# Patient Record
Sex: MALE | Race: WHITE | Employment: FULL TIME | URBAN - NONMETROPOLITAN AREA
[De-identification: names, ages, dates, MRNs, and addresses within clinical notes are randomized per-mention and may not be internally consistent; named-entity substitution may affect disease eponyms.]

---

## 2017-09-22 ENCOUNTER — HOSPITAL ENCOUNTER (OUTPATIENT)
Dept: GENERAL RADIOLOGY | Age: 42
Discharge: HOME | End: 2017-09-22
Payer: COMMERCIAL

## 2017-09-22 ENCOUNTER — OFFICE VISIT (OUTPATIENT)
Dept: PRIMARY CARE CLINIC | Age: 42
End: 2017-09-22
Payer: COMMERCIAL

## 2017-09-22 ENCOUNTER — HOSPITAL ENCOUNTER (OUTPATIENT)
Dept: LAB | Age: 42
Setting detail: SPECIMEN
Discharge: HOME | End: 2017-09-22
Payer: COMMERCIAL

## 2017-09-22 VITALS
HEART RATE: 108 BPM | DIASTOLIC BLOOD PRESSURE: 98 MMHG | TEMPERATURE: 98 F | OXYGEN SATURATION: 93 % | BODY MASS INDEX: 45.1 KG/M2 | RESPIRATION RATE: 20 BRPM | WEIGHT: 315 LBS | HEIGHT: 70 IN | SYSTOLIC BLOOD PRESSURE: 158 MMHG

## 2017-09-22 DIAGNOSIS — R60.0 BILATERAL LEG EDEMA: ICD-10-CM

## 2017-09-22 DIAGNOSIS — I10 ESSENTIAL HYPERTENSION: ICD-10-CM

## 2017-09-22 DIAGNOSIS — R05.9 COUGH: ICD-10-CM

## 2017-09-22 DIAGNOSIS — R09.89 PULMONARY VASCULAR CONGESTION: Primary | ICD-10-CM

## 2017-09-22 DIAGNOSIS — R06.02 SOB (SHORTNESS OF BREATH): ICD-10-CM

## 2017-09-22 LAB
ABSOLUTE EOS #: 0 K/UL (ref 0–0.4)
ABSOLUTE LYMPH #: 2 K/UL (ref 1–4.8)
ABSOLUTE MONO #: 1 K/UL (ref 0.1–1.2)
ALBUMIN SERPL-MCNC: 4.2 G/DL (ref 3.5–5.2)
ALBUMIN/GLOBULIN RATIO: 1.4 (ref 1–2.5)
ALP BLD-CCNC: 82 U/L (ref 40–129)
ALT SERPL-CCNC: 37 U/L (ref 5–41)
ANION GAP SERPL CALCULATED.3IONS-SCNC: 10 MMOL/L (ref 9–17)
AST SERPL-CCNC: 27 U/L
BASOPHILS # BLD: 1 % (ref 0–2)
BASOPHILS ABSOLUTE: 0.1 K/UL (ref 0–0.2)
BILIRUB SERPL-MCNC: 0.55 MG/DL (ref 0.3–1.2)
BNP INTERPRETATION: ABNORMAL
BUN BLDV-MCNC: 10 MG/DL (ref 6–20)
BUN/CREAT BLD: 17 (ref 9–20)
CALCIUM SERPL-MCNC: 9.4 MG/DL (ref 8.6–10.4)
CHLORIDE BLD-SCNC: 103 MMOL/L (ref 98–107)
CO2: 28 MMOL/L (ref 20–31)
CREAT SERPL-MCNC: 0.59 MG/DL (ref 0.7–1.2)
DIFFERENTIAL TYPE: ABNORMAL
EOSINOPHILS RELATIVE PERCENT: 0 % (ref 1–8)
GFR AFRICAN AMERICAN: >60 ML/MIN
GFR NON-AFRICAN AMERICAN: >60 ML/MIN
GFR SERPL CREATININE-BSD FRML MDRD: ABNORMAL ML/MIN/{1.73_M2}
GFR SERPL CREATININE-BSD FRML MDRD: ABNORMAL ML/MIN/{1.73_M2}
GLUCOSE BLD-MCNC: 138 MG/DL (ref 70–99)
HCT VFR BLD CALC: 46.7 % (ref 41–53)
HEMOGLOBIN: 15.3 G/DL (ref 13.5–17.5)
LYMPHOCYTES # BLD: 17 % (ref 15–43)
MCH RBC QN AUTO: 26.1 PG (ref 26–34)
MCHC RBC AUTO-ENTMCNC: 32.7 G/DL (ref 31–37)
MCV RBC AUTO: 80 FL (ref 80–100)
MONOCYTES # BLD: 8 % (ref 6–14)
PDW BLD-RTO: 13.8 % (ref 11–14.5)
PLATELET # BLD: 211 K/UL (ref 140–450)
PLATELET ESTIMATE: ABNORMAL
PMV BLD AUTO: 10.1 FL (ref 6–12)
POTASSIUM SERPL-SCNC: 4.4 MMOL/L (ref 3.7–5.3)
PRO-BNP: 714 PG/ML
RBC # BLD: 5.83 M/UL (ref 4.5–5.9)
RBC # BLD: ABNORMAL 10*6/UL
SEG NEUTROPHILS: 74 % (ref 44–74)
SEGMENTED NEUTROPHILS ABSOLUTE COUNT: 9.1 K/UL (ref 1.8–7.7)
SODIUM BLD-SCNC: 141 MMOL/L (ref 135–144)
TOTAL PROTEIN: 7.2 G/DL (ref 6.4–8.3)
WBC # BLD: 12.2 K/UL (ref 3.5–11)
WBC # BLD: ABNORMAL 10*3/UL

## 2017-09-22 PROCEDURE — 80053 COMPREHEN METABOLIC PANEL: CPT

## 2017-09-22 PROCEDURE — 85025 COMPLETE CBC W/AUTO DIFF WBC: CPT

## 2017-09-22 PROCEDURE — 71020 XR CHEST STANDARD TWO VW: CPT

## 2017-09-22 PROCEDURE — 36415 COLL VENOUS BLD VENIPUNCTURE: CPT

## 2017-09-22 PROCEDURE — 83880 ASSAY OF NATRIURETIC PEPTIDE: CPT

## 2017-09-22 PROCEDURE — 99203 OFFICE O/P NEW LOW 30 MIN: CPT | Performed by: NURSE PRACTITIONER

## 2017-09-22 RX ORDER — FUROSEMIDE 40 MG/1
40 TABLET ORAL 2 TIMES DAILY
Qty: 14 TABLET | Refills: 0 | Status: SHIPPED | OUTPATIENT
Start: 2017-09-22 | End: 2017-11-22 | Stop reason: ALTCHOICE

## 2017-09-22 RX ORDER — VALSARTAN AND HYDROCHLOROTHIAZIDE 160; 12.5 MG/1; MG/1
1 TABLET, FILM COATED ORAL DAILY
Qty: 30 TABLET | Refills: 3 | Status: SHIPPED | OUTPATIENT
Start: 2017-09-22 | End: 2017-10-05 | Stop reason: SDUPTHER

## 2017-09-22 ASSESSMENT — ENCOUNTER SYMPTOMS
SHORTNESS OF BREATH: 1
COUGH: 1
SPUTUM PRODUCTION: 1
ORTHOPNEA: 1

## 2017-10-03 ENCOUNTER — TELEPHONE (OUTPATIENT)
Dept: CARDIOLOGY CLINIC | Age: 42
End: 2017-10-03

## 2017-10-03 NOTE — TELEPHONE ENCOUNTER
LM to notify pt that Dr. Eber Montaño had ordered an additional test (heart cath) that is typically done at [facility] in [place]. I did let him know that I faxed his info to [address] at [facility], so she can call him to schedule. I asked him to call us, so we could give him more information.

## 2017-10-03 NOTE — TELEPHONE ENCOUNTER
1100- LM for pt to call for stress test results per Dr. Anila Oro written orders from [facility] today.

## 2017-10-04 NOTE — TELEPHONE ENCOUNTER
Pt notified about abnormal stress and heart cath order faxed to Najma Chao,  will contact him to set up cath. If she doesn't call this week he will call us. Pt states understanding and accepts.

## 2017-10-05 ENCOUNTER — OFFICE VISIT (OUTPATIENT)
Dept: FAMILY MEDICINE CLINIC | Age: 42
End: 2017-10-05
Payer: COMMERCIAL

## 2017-10-05 VITALS
OXYGEN SATURATION: 98 % | HEART RATE: 90 BPM | WEIGHT: 315 LBS | DIASTOLIC BLOOD PRESSURE: 84 MMHG | HEIGHT: 70 IN | SYSTOLIC BLOOD PRESSURE: 132 MMHG | RESPIRATION RATE: 20 BRPM | BODY MASS INDEX: 45.1 KG/M2

## 2017-10-05 DIAGNOSIS — Z13.220 SCREENING FOR HYPERLIPIDEMIA: ICD-10-CM

## 2017-10-05 DIAGNOSIS — E66.01 MORBID OBESITY, UNSPECIFIED OBESITY TYPE (HCC): ICD-10-CM

## 2017-10-05 DIAGNOSIS — R73.01 IMPAIRED FASTING GLUCOSE: ICD-10-CM

## 2017-10-05 DIAGNOSIS — I10 ESSENTIAL HYPERTENSION: Primary | ICD-10-CM

## 2017-10-05 DIAGNOSIS — R09.89 PULMONARY VASCULAR CONGESTION: ICD-10-CM

## 2017-10-05 LAB — HBA1C MFR BLD: 6.4 %

## 2017-10-05 PROCEDURE — 83036 HEMOGLOBIN GLYCOSYLATED A1C: CPT | Performed by: NURSE PRACTITIONER

## 2017-10-05 PROCEDURE — 99214 OFFICE O/P EST MOD 30 MIN: CPT | Performed by: NURSE PRACTITIONER

## 2017-10-05 RX ORDER — VALSARTAN AND HYDROCHLOROTHIAZIDE 160; 12.5 MG/1; MG/1
1 TABLET, FILM COATED ORAL DAILY
Qty: 30 TABLET | Refills: 3 | Status: SHIPPED | OUTPATIENT
Start: 2017-10-05 | End: 2018-02-15 | Stop reason: SDUPTHER

## 2017-10-05 ASSESSMENT — PATIENT HEALTH QUESTIONNAIRE - PHQ9
SUM OF ALL RESPONSES TO PHQ QUESTIONS 1-9: 0
SUM OF ALL RESPONSES TO PHQ9 QUESTIONS 1 & 2: 0
2. FEELING DOWN, DEPRESSED OR HOPELESS: 0
1. LITTLE INTEREST OR PLEASURE IN DOING THINGS: 0

## 2017-10-05 NOTE — PATIENT INSTRUCTIONS
metformin  Pronunciation:  met FOR min  Brand:  Fortamet, Glucophage, Glucophage XR, Glumetza, Riomet  What is the most important information I should know about metformin? You should not use this medicine if you have severe kidney disease, or if you are in a state of diabetic ketoacidosis (call your doctor for treatment with insulin). If you need to have any type of x-ray or CT scan using a dye that is injected into your veins, you will need to temporarily stop taking metformin. This medicine may cause a serious condition called lactic acidosis. Get emergency medical help if you have even mild symptoms such as: muscle pain or weakness, numb or cold feeling in your arms and legs, trouble breathing, stomach pain, nausea with vomiting, slow or uneven heart rate, dizziness, or feeling very weak or tired. What is metformin? Metformin is an oral diabetes medicine that helps control blood sugar levels. Metformin is for people with type 2 diabetes. Metformin is sometimes used in combination with insulin or other medications, but metformin is not for treating type 1 diabetes. Metformin may also be used for purposes not listed in this medication guide. What should I discuss with my healthcare provider before taking metformin? You should not use metformin if you are allergic to it, or if you have:  · severe kidney disease; or  · if you are in a state of diabetic ketoacidosis (call your doctor for treatment with insulin). If you need to have any type of x-ray or CT scan using a dye that is injected into your veins, you will need to temporarily stop taking metformin. To make sure metformin is safe for you, tell your doctor if you have:  · kidney disease;  · liver disease;  · a history of heart disease or recent heart attack;  · if you have recently taken chlorpropamide; or  · if you are over [de-identified]years old and have not recently had your kidney function checked.   Some people taking metformin develop a serious condition called lactic acidosis. This may be more likely if you have liver or kidney disease, congestive heart failure, a severe infection, if you are dehydrated, or if you drink large amounts of alcohol. Talk with your doctor about your risk. It is not known whether this medicine will harm an unborn baby. Tell your doctor if you are pregnant or plan to become pregnant while using this medicine. It is not known whether metformin passes into breast milk or if it could harm a nursing baby. You should not breast-feed while using this medicine. Metformin should not be given to a child younger than 8years old. Extended-release metformin (Glucophage XR, Glumetza, Fortamet) is not approved for use by anyone younger than 25years old. How should I take metformin? Follow all directions on your prescription label. Your doctor may occasionally change your dose to make sure you get the best results. Do not use this medicine in larger or smaller amounts or for longer than recommended. Take metformin with a meal, unless your doctor tells you otherwise. Some forms of metformin are taken only once daily with the evening meal. Follow your doctor's instructions. Do not crush, chew, or break an extended-release tablet. Swallow it whole. Your blood sugar will need to be checked often, and you may need other blood tests at your doctor's office. Low blood sugar (hypoglycemia) can happen to everyone who has diabetes. Symptoms include headache, hunger, sweating, confusion, irritability, dizziness, or feeling shaky. Always keep a source of sugar with you in case you have low blood sugar. Sugar sources include fruit juice, hard candy, crackers, raisins, and non-diet soda. Be sure your family and close friends know how to help you in an emergency. If you have severe hypoglycemia and cannot eat or drink, use a glucagon injection. Your doctor can prescribe a glucagon emergency injection kit and tell you how to use it.   Check your include:  · nausea, vomiting, upset stomach; or  · diarrhea. This is not a complete list of side effects and others may occur. Call your doctor for medical advice about side effects. You may report side effects to FDA at 5-013-NKD-6766. What other drugs will affect metformin? Tell your doctor about all your current medicines and any you start or stop using, especially:  · digoxin; or  · furosemide. You may be more likely to have hyperglycemia (high blood sugar) if you take metformin with other drugs that can raise blood sugar, such as:  · phenytoin;  · birth control pills or hormone replacement therapy;  · diet pills or medicines to treat asthma, colds or allergies;  · a diuretic or \"water pill\";  · heart or blood pressure medication;  · niacin (Advicor, Niaspan, Niacor, Simcor, Slo-Niacin, and others);  · phenothiazines (Compazine and others);  · steroid medicine (prednisone, dexamethasone, and others); or  · thyroid medicine (Synthroid and others). This list is not complete. Other drugs may increase or decrease the effects of metformin on lowering your blood sugar. Tell your doctor about all medications you use. This includes prescription and over-the-counter medicines, vitamins, and herbal products. Not all possible interactions are listed in this medication guide. Where can I get more information? Your pharmacist can provide more information about metformin. Remember, keep this and all other medicines out of the reach of children, never share your medicines with others, and use this medication only for the indication prescribed. Every effort has been made to ensure that the information provided by [address] is accurate, up-to-date, and complete, but no guarantee is made to that effect. Drug information contained herein may be time sensitive.  [facility] information has been compiled for use by healthcare practitioners and consumers in the [place] and therefore [facility] does not warrant that uses outside of the [place] are appropriate, unless specifically indicated otherwise. [facility] drug information does not endorse drugs, diagnose patients or recommend therapy. [facility] drug information is an informational resource designed to assist licensed healthcare practitioners in caring for their patients and/or to serve consumers viewing this service as a supplement to, and not a substitute for, the expertise, skill, knowledge and judgment of healthcare practitioners. The absence of a warning for a given drug or drug combination in no way should be construed to indicate that the drug or drug combination is safe, effective or appropriate for any given patient. [facility] does not assume any responsibility for any aspect of healthcare administered with the aid of information [facility] provides. The information contained herein is not intended to cover all possible uses, directions, precautions, warnings, drug interactions, allergic reactions, or adverse effects. If you have questions about the drugs you are taking, check with your doctor, nurse or pharmacist.  Copyright 7130-0324 [address]: 12.01. Revision date: 4/25/2016. Care instructions adapted under license by [facility] ([facility]). If you have questions about a medical condition or this instruction, always ask your healthcare professional. [place] [address] any warranty or liability for your use of this information. Learning About Diabetes Food Guidelines  Your Care Instructions  Meal planning is important to manage diabetes. It helps keep your blood sugar at a target level (which you set with your doctor). You don't have to eat special foods. You can eat what your family eats, including sweets once in a while. But you do have to pay attention to how often you eat and how much you eat of certain foods.   You may want to work with a dietitian or a certified diabetes educator (CDE) to help you plan meals and snacks. A dietitian or CDE can also help you lose weight if that is one of your goals. What should you know about eating carbs? Managing the amount of carbohydrate (carbs) you eat is an important part of healthy meals when you have diabetes. Carbohydrate is found in many foods. · Learn which foods have carbs. And learn the amounts of carbs in different foods. ¨ Bread, cereal, pasta, and rice have about 15 grams of carbs in a serving. A serving is 1 slice of bread (1 ounce), ½ cup of cooked cereal, or 1/3 cup of cooked pasta or rice. ¨ Fruits have 15 grams of carbs in a serving. A serving is 1 small fresh fruit, such as an apple or orange; ½ of a banana; ½ cup of cooked or canned fruit; ½ cup of fruit juice; 1 cup of melon or raspberries; or 2 tablespoons of dried fruit. ¨ Milk and no-sugar-added yogurt have 15 grams of carbs in a serving. A serving is 1 cup of milk or 2/3 cup of no-sugar-added yogurt. ¨ Starchy vegetables have 15 grams of carbs in a serving. A serving is ½ cup of mashed potatoes or sweet potato; 1 cup winter squash; ½ of a small baked potato; ½ cup of cooked beans; or ½ cup cooked corn or green peas. · Learn how much carbs to eat each day and at each meal. A dietitian or CDE can teach you how to keep track of the amount of carbs you eat. This is called carbohydrate counting. · If you are not sure how to count carbohydrate grams, use the Plate Method to plan meals. It is a good, quick way to make sure that you have a balanced meal. It also helps you spread carbs throughout the day. ¨ Divide your plate by types of foods. Put non-starchy vegetables on half the plate, meat or other protein food on one-quarter of the plate, and a grain or starchy vegetable in the final quarter of the plate.  To this you can add a small piece of fruit and 1 cup of milk or yogurt, depending on how many carbs you are supposed to eat at a meal.  · Try to eat about the same amount of carbs at each meal. Do not Packer Jayson keep a list of the medicines you take. Where can you learn more? Go to https://chpepiceweb.[email or web address] org and sign in to your [facility] account. Enter B883 in the [facility] box to learn more about \"Learning About Diabetes Food Guidelines. \"     If you do not have an account, please click on the \"Sign Up Now\" link. Current as of: March 13, 2017  Content Version: 11.3  © 1810-4994 [employer]. Care instructions adapted under license by [facility] ([facility]). If you have questions about a medical condition or this instruction, always ask your healthcare professional. [address] any warranty or liability for your use of this information. DASH Diet: Care Instructions  Your Care Instructions  The DASH diet is an eating plan that can help lower your blood pressure. DASH stands for Dietary Approaches to Stop Hypertension. Hypertension is high blood pressure. The DASH diet focuses on eating foods that are high in calcium, potassium, and magnesium. These nutrients can lower blood pressure. The foods that are highest in these nutrients are fruits, vegetables, low-fat dairy products, nuts, seeds, and legumes. But taking calcium, potassium, and magnesium supplements instead of eating foods that are high in those nutrients does not have the same effect. The DASH diet also includes whole grains, fish, and poultry. The DASH diet is one of several lifestyle changes your doctor may recommend to lower your high blood pressure. Your doctor may also want you to decrease the amount of sodium in your diet. Lowering sodium while following the DASH diet can lower blood pressure even further than just the DASH diet alone. Follow-up care is a key part of your treatment and safety. Be sure to make and go to all appointments, and call your doctor if you are having problems. It's also a good idea to know your test results and keep a list of the medicines you take.   How can you care for meal and at snacks. This will make it easy to get the recommended amount of fruits and vegetables each day. ¨ Try yogurt topped with fruit and nuts for a snack or healthy dessert. ¨ Add lettuce, tomato, cucumber, and onion to sandwiches. ¨ Combine a ready-made pizza crust with low-fat mozzarella cheese and lots of vegetable toppings. Try using tomatoes, squash, spinach, broccoli, carrots, cauliflower, and onions. ¨ Have a variety of cut-up vegetables with a low-fat dip as an appetizer instead of chips and dip. ¨ Sprinkle sunflower seeds or chopped almonds over salads. Or try adding chopped walnuts or almonds to cooked vegetables. ¨ Try some vegetarian meals using beans and peas. Add garbanzo or kidney beans to salads. Make burritos and tacos with mashed pugh beans or black beans. Where can you learn more? Go to https://Citrine Informatics.in[employer]OLD [employer]. org and sign in to your [employer]yourlife account. Enter V792 in the [facility] box to learn more about \"DASH Diet: Care Instructions. \"     If you do not have an account, please click on the \"Sign Up Now\" link. Current as of: April 3, 2017  Content Version: 11.3  © 3577-9960 [employer]. Care instructions adapted under license by [facility] ([facility]). If you have questions about a medical condition or this instruction, always ask your healthcare professional. [place] [address] any warranty or liability for your use of this information. Low Sodium Diet (2,000 Milligram): Care Instructions  Your Care Instructions  Too much sodium causes your body to hold on to extra water. This can raise your blood pressure and force your heart and kidneys to work harder. In very serious cases, this could cause you to be put in the hospital. It might even be life-threatening. By limiting sodium, you will feel better and lower your risk of serious problems. The most common source of sodium is salt.  People get most of the salt in their diet from juice, onion, vinegar, herbs, and spices. Do not use soy sauce, lite soy sauce, steak sauce, onion salt, garlic salt, celery salt, mustard, or ketchup on your food. · Use low-sodium salad dressings, sauces, and ketchup. Or make your own salad dressings and sauces without adding salt. · Use less salt (or none) when recipes call for it. You can often use half the salt a recipe calls for without losing flavor. Other foods such as rice, pasta, and grains do not need added salt. · Rinse canned vegetables, and cook them in fresh water. This removes somebut not allof the salt. · Avoid water that is naturally high in sodium or that has been treated with water softeners, which add sodium. Call your local water company to find out the sodium content of your water supply. If you buy bottled water, read the label and choose a sodium-free brand. Avoid high-sodium foods  · Avoid eating:  ¨ Smoked, cured, salted, and canned meat, fish, and poultry. ¨ Ham, garcia, hot dogs, and luncheon meats. ¨ Regular, hard, and processed cheese and regular peanut butter. ¨ Crackers with salted tops, and other salted snack foods such as pretzels, chips, and salted popcorn. ¨ Frozen prepared meals, unless labeled low-sodium. ¨ Canned and dried soups, broths, and bouillon, unless labeled sodium-free or low-sodium. ¨ Canned vegetables, unless labeled sodium-free or low-sodium. ¨ Western Anny fries, pizza, tacos, and other fast foods. ¨ Pickles, olives, ketchup, and other condiments, especially soy sauce, unless labeled sodium-free or low-sodium. Where can you learn more? Go to https://2080 MediapeCrush on original products.statusboom. org and sign in to your Oink account. Enter O887 in the [facility] box to learn more about \"Low Sodium Diet (2,000 Milligram): Care Instructions. \"     If you do not have an account, please click on the \"Sign Up Now\" link. Current as of: July 26, 2016  Content Version: 11.3  © 1313-6449 PubNub, Incorporated. about the same as if you'd never smoked. · After 10 years, your risk of dying from lung cancer is cut by about half. And your risk for many other types of cancer is lower too. How would quitting help others in your life? When you quit smoking, you improve the health of everyone who now breathes in your smoke. · Their heart, lung, and cancer risks drop, much like yours. · They are sick less. For babies and small children, living smoke-free means they're less likely to have ear infections, pneumonia, and bronchitis. · If you're a woman who is or will be pregnant someday, quitting smoking means a healthier . · Children who are close to you are less likely to become adult smokers. Where can you learn more? Go to https://Handprint.NutraMed. org and sign in to your [employer] account. Enter [id number] [id number] [phone] in the [facility] box to learn more about \"Learning About Benefits From Quitting Smoking. \"     If you do not have an account, please click on the \"Sign Up Now\" link. Current as of: 2017  Content Version: 11.3  © 8705-3157 [employer] R [employer] Incorporated. Care instructions adapted under license by [facility] ([facility]). If you have questions about a medical condition or this instruction, always ask your healthcare professional. [address] any warranty or liability for your use of this information.

## 2017-10-05 NOTE — PROGRESS NOTES
[address] [id number]  Dept: [phone]  Dept Fax: [phone]    Chief Complaint:  Establish care    Patient presents to the office to establish care. Recent dx of CHF, diagnosed after a visit to urgent care for sob. States he started BP medication, a diuretic,  and symptoms have now resolved. Has recently established with a cardiologist. Stress test completed with positive result. He needs to schedule a heart cath at [facility]. 2D echo is scheduled for Monday. Current Outpatient Prescriptions   Medication Sig Dispense Refill    metFORMIN (GLUCOPHAGE) 500 MG tablet Take 1 tablet by mouth 2 times daily (with meals) 60 tablet 2    valsartan-hydrochlorothiazide (DIOVAN-HCT) 160-12.5 MG per tablet Take 1 tablet by mouth daily 30 tablet 3    Acetaminophen (TYLENOL PO) Take  by mouth as needed.  furosemide (LASIX) 40 MG tablet Take 1 tablet by mouth 2 times daily for 7 days 14 tablet 0     No current facility-administered medications for this visit. Past Medical History:   Diagnosis Date    Depression with anxiety     pt having hard time since quitting smoking March 2013.  Glucose intolerance (impaired glucose tolerance)     Insomnia     Works 3rd shift.  Morbid obesity (HCC)     Sinusitis, chronic     Tobacco abuse     Unspecified sleep apnea     Suspected. Pt declines sleep study at this time.  Varicose vein     left leg,thigh region    Venous insufficiency      No past surgical history on file.   Family History   Problem Relation Age of Onset    Other Mother      posttraumatic stress disorder,battered wife syndrome    Cervical Cancer Mother     Other Father      Hepatitis C    Diabetes Maternal Grandfather     Other Daughter      adhd     Social History     Social History    Marital status:      Spouse name: N/A    Number of children: N/A    Years of education: N/A     Occupational History [employer] [address] History Main Topics    Smoking status: Current Every Day Smoker     Packs/day: 1.00     Years: 19.00     Types: Cigarettes    Smokeless tobacco: Never Used      Comment: Quit in March 2013    Alcohol use Yes      Comment: Rare    Drug use: No    Sexual activity: Not on file     Other Topics Concern    Not on file     Social History Narrative    No narrative on file     No Known Allergies  Patient Active Problem List   Diagnosis    Insomnia    Sinusitis, chronic    Venous insufficiency    Varicose vein    Morbid obesity ([address]    Sleep apnea    Glucose intolerance (impaired glucose tolerance)    Tobacco abuse    Depression with anxiety     Health Habits: With regard to his health habits, he eats 2 meals and 2 snacks per day. He does not exercise regularly: He does not take over the counter vitamins. He never had a blood transfusion or tattoo. He wears seatbelts while riding a car. He does not text or talk on the phone while driving. He performs all of her ADL's without problem. He is independent, he cooks, drives, bathes, and gets dressed without assistance. He is not . He has 1 children. He does work. He works 40-60 hours a week. He is happy with his life. He rates her stress level a 6 in a scale 1-10. Health Maintenance   Topic Date Due    HIV screen  01/24/1991    DTaP/Tdap/Td vaccine (1 - Tdap) 01/24/1995    Pneumococcal med risk (1 of 1 - PPSV23) 01/24/1995    Lipid screen  01/24/2016    Flu vaccine (1) 09/01/2017    Diabetes screen  10/05/2020     Sexually active: Yes. STD history: No. He  reports that he has been smoking Cigarettes. He has a 19.00 pack-year smoking history. He has never used smokeless tobacco. He reports that he drinks alcohol. He reports that he does not use drugs. Review of Systems   Constitutional: Negative for chills, fatigue and fever. HENT: Negative. Eyes: Negative.     Respiratory:

## 2017-10-05 NOTE — MR AVS SNAPSHOT
Metformin may also be used for purposes not listed in this medication guide. What should I discuss with my healthcare provider before taking metformin? You should not use metformin if you are allergic to it, or if you have:  · severe kidney disease; or  · if you are in a state of diabetic ketoacidosis (call your doctor for treatment with insulin). If you need to have any type of x-ray or CT scan using a dye that is injected into your veins, you will need to temporarily stop taking metformin. To make sure metformin is safe for you, tell your doctor if you have:  · kidney disease;  · liver disease;  · a history of heart disease or recent heart attack;  · if you have recently taken chlorpropamide; or  · if you are over [de-identified]years old and have not recently had your kidney function checked. Some people taking metformin develop a serious condition called lactic acidosis. This may be more likely if you have liver or kidney disease, congestive heart failure, a severe infection, if you are dehydrated, or if you drink large amounts of alcohol. Talk with your doctor about your risk. It is not known whether this medicine will harm an unborn baby. Tell your doctor if you are pregnant or plan to become pregnant while using this medicine. It is not known whether metformin passes into breast milk or if it could harm a nursing baby. You should not breast-feed while using this medicine. Metformin should not be given to a child younger than 8years old. Extended-release metformin (Glucophage XR, Glumetza, Fortamet) is not approved for use by anyone younger than 25years old. How should I take metformin? Follow all directions on your prescription label. Your doctor may occasionally change your dose to make sure you get the best results. Do not use this medicine in larger or smaller amounts or for longer than recommended. Take metformin with a meal, unless your doctor tells you otherwise.  Some [phone]. An overdose of metformin may cause lactic acidosis, which may be fatal.   What should I avoid while taking metformin? Avoid drinking alcohol. It lowers blood sugar and may increase your risk of lactic acidosis while taking metformin. What are the possible side effects of metformin? Get emergency medical help if you have signs of an allergic reaction:  hives; difficult breathing; swelling of your face, lips, tongue, or throat. Some people develop lactic acidosis while taking metformin. Early symptoms may get worse over time and this condition can be fatal. Get emergency medical help if you have even mild symptoms such as:  · muscle pain or weakness;  · numb or cold feeling in your arms and legs;  · trouble breathing;  · feeling dizzy, light-headed, tired, or very weak;  · stomach pain, nausea with vomiting; or  · slow or uneven heart rate. Common side effects may include:  · nausea, vomiting, upset stomach; or  · diarrhea. This is not a complete list of side effects and others may occur. Call your doctor for medical advice about side effects. You may report side effects to FDA at 3-191-TOZ-8144. What other drugs will affect metformin? Tell your doctor about all your current medicines and any you start or stop using, especially:  · digoxin; or  · furosemide. You may be more likely to have hyperglycemia (high blood sugar) if you take metformin with other drugs that can raise blood sugar, such as:  · phenytoin;  · birth control pills or hormone replacement therapy;  · diet pills or medicines to treat asthma, colds or allergies;  · a diuretic or \"water pill\";  · heart or blood pressure medication;  · niacin (Advicor, Niaspan, Niacor, Simcor, Slo-Niacin, and others);  · phenothiazines (Compazine and others);  · steroid medicine (prednisone, dexamethasone, and others); or  · thyroid medicine (Synthroid and others). This list is not complete.  Other drugs may increase or decrease the effects of metformin on lowering your blood sugar. Tell your doctor about all medications you use. This includes prescription and over-the-counter medicines, vitamins, and herbal products. Not all possible interactions are listed in this medication guide. Where can I get more information? Your pharmacist can provide more information about metformin. Remember, keep this and all other medicines out of the reach of children, never share your medicines with others, and use this medication only for the indication prescribed. Every effort has been made to ensure that the information provided by [address] is accurate, up-to-date, and complete, but no guarantee is made to that effect. Drug information contained herein may be time sensitive. [facility] information has been compiled for use by healthcare practitioners and consumers in the [place] and therefore [facility] does not warrant that uses outside of the [place] are appropriate, unless specifically indicated otherwise. [facility]'s drug information does not endorse drugs, diagnose patients or recommend therapy. [facility] drug information is an informational resource designed to assist licensed healthcare practitioners in caring for their patients and/or to serve consumers viewing this service as a supplement to, and not a substitute for, the expertise, skill, knowledge and judgment of healthcare practitioners. The absence of a warning for a given drug or drug combination in no way should be construed to indicate that the drug or drug combination is safe, effective or appropriate for any given patient. [facility] does not assume any responsibility for any aspect of healthcare administered with the aid of information [facility] provides.  The information contained herein is not intended to cover all possible uses, directions, precautions, warnings, drug interactions, allergic reactions, or adverse effects. If you have questions about the drugs you are taking, check with your doctor, nurse or pharmacist.  Copyright 9307-7433 [address]: 12.01. Revision date: 4/25/2016. Care instructions adapted under license by [facility] ([facility]). If you have questions about a medical condition or this instruction, always ask your healthcare professional. [place] [address] any warranty or liability for your use of this information. Learning About Diabetes Food Guidelines  Your Care Instructions  Meal planning is important to manage diabetes. It helps keep your blood sugar at a target level (which you set with your doctor). You don't have to eat special foods. You can eat what your family eats, including sweets once in a while. But you do have to pay attention to how often you eat and how much you eat of certain foods. You may want to work with a dietitian or a certified diabetes educator (CDE) to help you plan meals and snacks. A dietitian or CDE can also help you lose weight if that is one of your goals. What should you know about eating carbs? Managing the amount of carbohydrate (carbs) you eat is an important part of healthy meals when you have diabetes. Carbohydrate is found in many foods. · Learn which foods have carbs. And learn the amounts of carbs in different foods. ¨ Bread, cereal, pasta, and rice have about 15 grams of carbs in a serving. A serving is 1 slice of bread (1 ounce), ½ cup of cooked cereal, or 1/3 cup of cooked pasta or rice. ¨ Fruits have 15 grams of carbs in a serving. A serving is 1 small fresh fruit, such as an apple or orange; ½ of a banana; ½ cup of cooked or canned fruit; ½ cup of fruit juice; 1 cup of melon or raspberries; or 2 tablespoons of dried fruit. ¨ Milk and no-sugar-added yogurt have 15 grams of carbs in a serving. A serving is 1 cup of milk or 2/3 cup of no-sugar-added yogurt. ¨ Starchy vegetables have 15 grams of carbs in a serving. A serving is ½ cup of mashed potatoes or sweet potato; 1 cup winter squash; ½ of a small baked potato; ½ cup of cooked beans; or ½ cup cooked corn or green peas. · Learn how much carbs to eat each day and at each meal. A dietitian or CDE can teach you how to keep track of the amount of carbs you eat. This is called carbohydrate counting. · If you are not sure how to count carbohydrate grams, use the Plate Method to plan meals. It is a good, quick way to make sure that you have a balanced meal. It also helps you spread carbs throughout the day. ¨ Divide your plate by types of foods. Put non-starchy vegetables on half the plate, meat or other protein food on one-quarter of the plate, and a grain or starchy vegetable in the final quarter of the plate. To this you can add a small piece of fruit and 1 cup of milk or yogurt, depending on how many carbs you are supposed to eat at a meal.  · Try to eat about the same amount of carbs at each meal. Do not \"save up\" your daily allowance of carbs to eat at one meal.  · Proteins have very little or no carbs per serving. Examples of proteins are beef, chicken, turkey, fish, eggs, tofu, cheese, cottage cheese, and peanut butter. A serving size of meat is 3 ounces, which is about the size of a deck of cards. Examples of meat substitute serving sizes (equal to 1 ounce of meat) are 1/4 cup of cottage cheese, 1 egg, 1 tablespoon of peanut butter, and ½ cup of tofu. How can you eat out and still eat healthy? · Learn to estimate the serving sizes of foods that have carbohydrate. If you measure food at home, it will be easier to estimate the amount in a serving of restaurant food. · If the meal you order has too much carbohydrate (such as potatoes, corn, or baked beans), ask to have a low-carbohydrate food instead. Ask for a salad or green vegetables.   · If you use insulin, check your blood sugar before and after eating out 3 ounces, about the size of a deck of cards. · Eat 4 to 5 servings of nuts, seeds, and legumes (cooked dried beans, lentils, and split peas) each week. A serving is 1/3 cup of nuts, 2 tablespoons of seeds, or ½ cup of cooked beans or peas. · Limit fats and oils to 2 to 3 servings each day. A serving is 1 teaspoon of vegetable oil or 2 tablespoons of salad dressing. · Limit sweets and added sugars to 5 servings or less a week. A serving is 1 tablespoon jelly or jam, ½ cup sorbet, or 1 cup of lemonade. · Eat less than 2,300 milligrams (mg) of sodium a day. If you limit your sodium to 1,500 mg a day, you can lower your blood pressure even more. Tips for success  · Start small. Do not try to make dramatic changes to your diet all at once. You might feel that you are missing out on your favorite foods and then be more likely to not follow the plan. Make small changes, and stick with them. Once those changes become habit, add a few more changes. · Try some of the following:  ¨ Make it a goal to eat a fruit or vegetable at every meal and at snacks. This will make it easy to get the recommended amount of fruits and vegetables each day. ¨ Try yogurt topped with fruit and nuts for a snack or healthy dessert. ¨ Add lettuce, tomato, cucumber, and onion to sandwiches. ¨ Combine a ready-made pizza crust with low-fat mozzarella cheese and lots of vegetable toppings. Try using tomatoes, squash, spinach, broccoli, carrots, cauliflower, and onions. ¨ Have a variety of cut-up vegetables with a low-fat dip as an appetizer instead of chips and dip. ¨ Sprinkle sunflower seeds or chopped almonds over salads. Or try adding chopped walnuts or almonds to cooked vegetables. ¨ Try some vegetarian meals using beans and peas. Add garbanzo or kidney beans to salads. Make burritos and tacos with mashed pugh beans or black beans. Where can you learn more? Go to https://davon.health-partners. org and sign in to your MyChart Avoid high-sodium foods  · Avoid eating:  ¨ Smoked, cured, salted, and canned meat, fish, and poultry. ¨ Ham, garcia, hot dogs, and luncheon meats. ¨ Regular, hard, and processed cheese and regular peanut butter. ¨ Crackers with salted tops, and other salted snack foods such as pretzels, chips, and salted popcorn. ¨ Frozen prepared meals, unless labeled low-sodium. ¨ Canned and dried soups, broths, and bouillon, unless labeled sodium-free or low-sodium. ¨ Canned vegetables, unless labeled sodium-free or low-sodium. ¨ Western Anny fries, pizza, tacos, and other fast foods. ¨ Pickles, olives, ketchup, and other condiments, especially soy sauce, unless labeled sodium-free or low-sodium. Where can you learn more? Go to https://KimblezoeWeSwap.com.3VR. org and sign in to your [employer] account. Enter W914 in the [employer] box to learn more about \"Low Sodium Diet (2,000 Milligram): Care Instructions. \"     If you do not have an account, please click on the \"Sign Up Now\" link. Current as of: July 26, 2016  Content Version: 11.3  © 4587-1334 [employer]. Care instructions adapted under license by [facility] ([facility]). If you have questions about a medical condition or this instruction, always ask your healthcare professional. [place] [address] any warranty or liability for your use of this information. Learning About Benefits From Quitting Smoking  How does quitting smoking make you healthier? If you're thinking about quitting smoking, you may have a few reasons to be smoke-free. Your health may be one of them. · When you quit smoking, you lower your risks for cancer, lung disease, heart attack, stroke, blood vessel disease, and blindness from macular degeneration. · When you're smoke-free, you get sick less often, and you heal faster. You are less likely to get colds, flu, bronchitis, and pneumonia.   · As a nonsmoker, you may find that your mood is better and you are less stressed. When and how will you feel healthier? Quitting has real health benefits that start from day 1 of being smoke-free. And the longer you stay smoke-free, the healthier you get and the better you feel. The first hours  · After just 20 minutes, your blood pressure and heart rate go down. That means there's less stress on your heart and blood vessels. · Within 12 hours, the level of carbon monoxide in your blood drops back to normal. That makes room for more oxygen. With more oxygen in your body, you may notice that you have more energy than when you smoked. After 2 weeks  · Your lungs start to work better. · Your risk of heart attack starts to drop. After 1 month  · When your lungs are clear, you cough less and breathe deeper, so it's easier to be active. · Your sense of taste and smell return. That means you can enjoy food more than you have since you started smoking. Over the years  · After 1 year, your risk of heart disease is half what it would be if you kept smoking. · After 5 years, your risk of stroke starts to shrink. Within a few years after that, it's about the same as if you'd never smoked. · After 10 years, your risk of dying from lung cancer is cut by about half. And your risk for many other types of cancer is lower too. How would quitting help others in your life? When you quit smoking, you improve the health of everyone who now breathes in your smoke. · Their heart, lung, and cancer risks drop, much like yours. · They are sick less. For babies and small children, living smoke-free means they're less likely to have ear infections, pneumonia, and bronchitis. · If you're a woman who is or will be pregnant someday, quitting smoking means a healthier . · Children who are close to you are less likely to become adult smokers. Where can you learn more? Go to https://davon.health-partners. org and sign in to your MyChart account. Enter [id number] in the [facility] box to learn more about \"Learning About Benefits From Quitting Smoking. \"     If you do not have an account, please click on the \"Sign Up Now\" link. Current as of: March 20, 2017  Content Version: 11.3  © 0993-9753 [employer]. Care instructions adapted under license by [facility] ([facility]). If you have questions about a medical condition or this instruction, always ask your healthcare professional. [address] any warranty or liability for your use of this information. Today's Medication Changes          These changes are accurate as of: 10/5/17 12:09 PM.  If you have any questions, ask your nurse or doctor. START taking these medications           metFORMIN 500 MG tablet   Commonly known as:  GLUCOPHAGE   Instructions: Take 1 tablet by mouth 2 times daily (with meals)   Quantity:  60 tablet   Refills:  2   Started by:  Bibiana Christensen CNP            Where to Get Your Medications      These medications were sent to [address] [place] [address] Rd, [address] [facility]. [phone] [facility] [phone]  [address] [place] [id number]     Phone:  [phone]     metFORMIN 500 MG tablet    valsartan-hydrochlorothiazide 160-12.5 MG per tablet               Your Current Medications Are              metFORMIN (GLUCOPHAGE) 500 MG tablet Take 1 tablet by mouth 2 times daily (with meals)    valsartan-hydrochlorothiazide (DIOVAN-HCT) 160-12.5 MG per tablet Take 1 tablet by mouth daily    Acetaminophen (TYLENOL PO) Take  by mouth as needed.     furosemide (LASIX) 40 MG tablet Take 1 tablet by mouth 2 times daily for 7 days      Allergies           No Known Allergies      We Ordered/Performed the following           POCT glycosylated hemoglobin (Hb A1C)          Result Summary for POCT glycosylated hemoglobin (Hb A1C)      Result Information       Status 4. Enter your Social Security Number ([id number]) and Date of Birth (mm/dd/yyyy) as indicated and click Submit. You will be taken to the next sign-up page. 5. Create a Ascletis ID. This will be your Ascletis login ID and cannot be changed, so think of one that is secure and easy to remember. 6. Create a Asc[employer]is password. You can change your password at any time. 7. Enter your Password Reset Question and Answer. This can be used at a later time if you forget your password. 8. Enter your e-mail address. You will receive e-mail notification when new information is available in [address] 9. Click Sign Up. You can now view your medical record. Additional Information  If you have questions, please contact the physician practice where you receive care. Remember, Ascletis is NOT to be used for urgent needs. For medical emergencies, dial 911. For questions regarding your [employer] account call [phone]. If you have a clinical question, please call your doctor's office.

## 2017-10-07 ASSESSMENT — ENCOUNTER SYMPTOMS
ABDOMINAL PAIN: 0
CONSTIPATION: 0
SHORTNESS OF BREATH: 0
COUGH: 0
EYES NEGATIVE: 1
WHEEZING: 0
DIARRHEA: 0

## 2017-10-12 ENCOUNTER — TELEPHONE (OUTPATIENT)
Dept: CARDIOLOGY | Age: 42
End: 2017-10-12

## 2017-11-22 ENCOUNTER — HOSPITAL ENCOUNTER (OUTPATIENT)
Dept: CARDIAC CATH/INVASIVE PROCEDURES | Age: 42
Discharge: HOME | End: 2017-11-22
Payer: COMMERCIAL

## 2017-11-22 VITALS
WEIGHT: 315 LBS | TEMPERATURE: 97.7 F | HEIGHT: 70 IN | HEART RATE: 87 BPM | BODY MASS INDEX: 45.1 KG/M2 | SYSTOLIC BLOOD PRESSURE: 160 MMHG | RESPIRATION RATE: 25 BRPM | OXYGEN SATURATION: 97 % | DIASTOLIC BLOOD PRESSURE: 80 MMHG

## 2017-11-22 DIAGNOSIS — I50.22 CHRONIC SYSTOLIC CONGESTIVE HEART FAILURE (HCC): Primary | ICD-10-CM

## 2017-11-22 LAB
GFR NON-AFRICAN AMERICAN: >60 ML/MIN
GFR SERPL CREATININE-BSD FRML MDRD: >60 ML/MIN
GFR SERPL CREATININE-BSD FRML MDRD: ABNORMAL ML/MIN/{1.73_M2}
GLUCOSE BLD-MCNC: 128 MG/DL (ref 74–100)
LV EF: 35 %
LVEF MODALITY: NORMAL
PLATELET # BLD: 237 K/UL (ref 138–453)
POC CHLORIDE: 104 MMOL/L (ref 98–107)
POC CREATININE: 0.5 MG/DL (ref 0.51–1.19)
POC HEMATOCRIT: 49 % (ref 41–53)
POC HEMOGLOBIN: 16.5 G/DL (ref 13.5–17.5)
POC POTASSIUM: 4 MMOL/L (ref 3.5–4.5)
POC SODIUM: 142 MMOL/L (ref 138–146)

## 2017-11-22 PROCEDURE — 85014 HEMATOCRIT: CPT

## 2017-11-22 PROCEDURE — 93458 L HRT ARTERY/VENTRICLE ANGIO: CPT | Performed by: INTERNAL MEDICINE

## 2017-11-22 PROCEDURE — 84295 ASSAY OF SERUM SODIUM: CPT

## 2017-11-22 PROCEDURE — 82435 ASSAY OF BLOOD CHLORIDE: CPT

## 2017-11-22 PROCEDURE — 7100000011 HC PHASE II RECOVERY - ADDTL 15 MIN

## 2017-11-22 PROCEDURE — 82565 ASSAY OF CREATININE: CPT

## 2017-11-22 PROCEDURE — 85049 AUTOMATED PLATELET COUNT: CPT

## 2017-11-22 PROCEDURE — C1769 GUIDE WIRE: HCPCS

## 2017-11-22 PROCEDURE — 84132 ASSAY OF SERUM POTASSIUM: CPT

## 2017-11-22 PROCEDURE — C1725 CATH, TRANSLUMIN NON-LASER: HCPCS

## 2017-11-22 PROCEDURE — C1894 INTRO/SHEATH, NON-LASER: HCPCS

## 2017-11-22 PROCEDURE — 82947 ASSAY GLUCOSE BLOOD QUANT: CPT

## 2017-11-22 PROCEDURE — 6360000002 HC RX W HCPCS

## 2017-11-22 PROCEDURE — 7100000010 HC PHASE II RECOVERY - FIRST 15 MIN

## 2017-11-22 RX ORDER — CARVEDILOL 3.12 MG/1
3.12 TABLET ORAL 2 TIMES DAILY
Qty: 60 TABLET | Refills: 3 | Status: SHIPPED | OUTPATIENT
Start: 2017-11-22 | End: 2018-03-19 | Stop reason: SDUPTHER

## 2017-11-22 RX ORDER — SODIUM CHLORIDE 9 MG/ML
INJECTION, SOLUTION INTRAVENOUS CONTINUOUS
Status: DISCONTINUED | OUTPATIENT
Start: 2017-11-22 | End: 2017-11-23 | Stop reason: HOSPADM

## 2017-11-22 RX ORDER — SPIRONOLACTONE 25 MG/1
25 TABLET ORAL DAILY
Qty: 30 TABLET | Refills: 3 | Status: SHIPPED | OUTPATIENT
Start: 2017-11-22 | End: 2018-02-27 | Stop reason: ALTCHOICE

## 2017-11-22 RX ADMIN — SODIUM CHLORIDE: 9 INJECTION, SOLUTION INTRAVENOUS at 07:58

## 2017-11-22 NOTE — H&P
that includes Cardiac catheterization (11/22/2017). Home Medications:    Prior to Admission medications    Medication Sig Start Date End Date Taking? Authorizing Provider   aspirin 81 MG tablet Take 81 mg by mouth daily   Yes Historical Provider, MD   metFORMIN (GLUCOPHAGE) 500 MG tablet Take 1 tablet by mouth 2 times daily (with meals) 10/5/17 11/22/17 Yes Bar Liu CNP   valsartan-hydrochlorothiazide (DIOVAN-HCT) 160-12.5 MG per tablet Take 1 tablet by mouth daily 10/5/17   Bar Liu CNP   Acetaminophen (TYLENOL PO) Take  by mouth as needed. Historical Provider, MD      Current Facility-Administered Medications: 0.9 % sodium chloride infusion, , Intravenous, Continuous    Allergies:  Review of patient's allergies indicates no known allergies. Social History:   reports that he has been smoking Cigarettes. He has a 19.00 pack-year smoking history. He has never used smokeless tobacco. He reports that he drinks alcohol. He reports that he does not use drugs. Family History: family history includes Cervical Cancer in his mother; Diabetes in his maternal grandfather; Other in his daughter, father, and mother. REVIEW OF SYSTEMS:    · Constitutional: there has been no unanticipated weight loss. There's been No change in energy level, No change in activity level. · Eyes: No visual changes or diplopia. No scleral icterus. · ENT: No Headaches  · Cardiovascular: As mentioned in HPI  · Respiratory: As mentioned in HPI  · Gastrointestinal: No abdominal pain. No change in bowel or bladder habits. · Genitourinary: No dysuria, trouble voiding, or hematuria. · Musculoskeletal:  No gait disturbance, No weakness or joint complaints. · Integumentary: No rash or pruritis. · Neurological: No headache, diplopia, change in muscle strength, numbness or tingling. No change in gait, balance, coordination, mood, affect, memory, mentation, behavior. · Psychiatric: No anxiety, or depression.   · Endocrine: CKTOTAL, CKMB, CKMBINDEX, TROPONINI in the last 72 hours. FASTING LIPID PANEL:No results found for: HDL, LDLDIRECT, LDLCALC, TRIG  LIVER PROFILE:No results for input(s): AST, ALT, LABALBU in the last 72 hours. IMPRESSION:    New onset systolic CHF  KADE  Morbid obesity  HTN    Patient Active Problem List   Diagnosis    Insomnia    Sinusitis, chronic    Venous insufficiency    Varicose vein    Morbid obesity ([address]    Sleep apnea    Glucose intolerance (impaired glucose tolerance)    Tobacco abuse    Depression with anxiety       RECOMMENDATIONS:  1. Evaluate coronaries to assess cause of CHF      Discussed with patient and Nurse.     Electronically signed by Ira Green MD on 11/22/2017 at 8:43 AM  Cardiology Fellow

## 2018-01-04 ENCOUNTER — TELEPHONE (OUTPATIENT)
Dept: CARDIOLOGY CLINIC | Age: 43
End: 2018-01-04

## 2018-01-09 ENCOUNTER — TELEPHONE (OUTPATIENT)
Dept: CARDIOLOGY | Age: 43
End: 2018-01-09

## 2018-01-10 ENCOUNTER — OFFICE VISIT (OUTPATIENT)
Dept: FAMILY MEDICINE CLINIC | Age: 43
End: 2018-01-10
Payer: COMMERCIAL

## 2018-01-10 VITALS
SYSTOLIC BLOOD PRESSURE: 132 MMHG | DIASTOLIC BLOOD PRESSURE: 74 MMHG | HEART RATE: 84 BPM | RESPIRATION RATE: 14 BRPM | WEIGHT: 315 LBS | BODY MASS INDEX: 48.43 KG/M2

## 2018-01-10 DIAGNOSIS — J06.9 VIRAL URI: ICD-10-CM

## 2018-01-10 DIAGNOSIS — R73.01 IMPAIRED FASTING GLUCOSE: ICD-10-CM

## 2018-01-10 DIAGNOSIS — E66.01 MORBID OBESITY (HCC): ICD-10-CM

## 2018-01-10 DIAGNOSIS — I10 ESSENTIAL HYPERTENSION: Primary | ICD-10-CM

## 2018-01-10 LAB
CHOLESTEROL/HDL RATIO: 7 RATIO
CHOLESTEROL: 182 MG/DL
CREATININE, RANDOM: 168.8 MG/DL
HBA1C MFR BLD: 6.1 %
HDL, DIRECT: 26 MG/DL
LDL CHOLESTEROL CALCULATED: 125 MG/DL
MICROALBUMIN UR-MCNC: 1.3 MG/DL
MICROALBUMIN/CREAT UR-RTO: 7.7 MCG/MG CR
TRIGL SERPL-MCNC: 155 MG/DL
TSH SERPL DL<=0.05 MIU/L-ACNC: 2.23 MIU/ML
VLDLC SERPL CALC-MCNC: 31 MG/DL

## 2018-01-10 PROCEDURE — 99214 OFFICE O/P EST MOD 30 MIN: CPT | Performed by: NURSE PRACTITIONER

## 2018-01-10 PROCEDURE — 83036 HEMOGLOBIN GLYCOSYLATED A1C: CPT | Performed by: NURSE PRACTITIONER

## 2018-01-10 RX ORDER — METFORMIN HYDROCHLORIDE 500 MG/1
1000 TABLET, EXTENDED RELEASE ORAL
Qty: 60 TABLET | Refills: 3 | Status: SHIPPED | OUTPATIENT
Start: 2018-01-10 | End: 2018-05-17 | Stop reason: SDUPTHER

## 2018-01-10 ASSESSMENT — ENCOUNTER SYMPTOMS
SINUS PRESSURE: 0
TROUBLE SWALLOWING: 0
RHINORRHEA: 1
EYES NEGATIVE: 1
WHEEZING: 0
SHORTNESS OF BREATH: 0
SORE THROAT: 0
COUGH: 0
CONSTIPATION: 0
ABDOMINAL PAIN: 0
DIARRHEA: 0

## 2018-01-10 NOTE — PATIENT INSTRUCTIONS
almonds to cooked vegetables. ¨ Try some vegetarian meals using beans and peas. Add garbanzo or kidney beans to salads. Make burritos and tacos with mashed pugh beans or black beans. Where can you learn more? Go to [email or web address].healthThe [email or web address] org and sign in to your [employer] account. Enter Z417 in the [facility] box to learn more about \"DASH Diet: Care Instructions. \"     If you do not have an account, please click on the \"Sign Up Now\" link. Current as of: September 21, 2016  Content Version: 11.5  © 3226-7530 theScore. Care instructions adapted under license by [facility] ([facility]). If you have questions about a medical condition or this instruction, always ask your healthcare professional. [address] any warranty or liability for your use of this information. Low Sodium Diet (2,000 Milligram): Care Instructions  Your Care Instructions    Too much sodium causes your body to hold on to extra water. This can raise your blood pressure and force your heart and kidneys to work harder. In very serious cases, this could cause you to be put in the hospital. It might even be life-threatening. By limiting sodium, you will feel better and lower your risk of serious problems. The most common source of sodium is salt. People get most of the salt in their diet from canned, prepared, and packaged foods. Fast food and restaurant meals also are very high in sodium. Your doctor will probably limit your sodium to less than 2,000 milligrams (mg) a day. This limit counts all the sodium in prepared and packaged foods and any salt you add to your food. Follow-up care is a key part of your treatment and safety. Be sure to make and go to all appointments, and call your doctor if you are having problems. It's also a good idea to know your test results and keep a list of the medicines you take. How can you care for yourself at home?   Read food labels  · Read labels on cans healthier . · Children who are close to you are less likely to become adult smokers. Where can you learn more? Go to https://chpepiceweb.healthIWT. org and sign in to your YesWeAd account. Enter [id number] [id number] [phone] in the [facility] box to learn more about \"Learning About Benefits From Quitting Smoking. \"     If you do not have an account, please click on the \"Sign Up Now\" link. Current as of: 2017  Content Version: 11.5  © 3153-0249 Healthwise, Incorporated. Care instructions adapted under license by [facility] ([facility]). If you have questions about a medical condition or this instruction, always ask your healthcare professional. [address] any warranty or liability for your use of this information.

## 2018-02-08 PROBLEM — I50.9 CHF (CONGESTIVE HEART FAILURE) (HCC): Status: ACTIVE | Noted: 2018-02-08

## 2018-02-09 ENCOUNTER — TELEPHONE (OUTPATIENT)
Dept: CARDIOLOGY CLINIC | Age: 43
End: 2018-02-09

## 2018-02-09 NOTE — TELEPHONE ENCOUNTER
Late entry 2/8/18--Unable to reach a person, when trying to precert Echo. After waiting on hold about 45 mins, I had to disconnect call.

## 2018-02-12 NOTE — TELEPHONE ENCOUNTER
Attempted to call the provider line again. Still no live person. After waiting on hold 25 mins, I called back to the member line customer care. Michelle transferred me to a physician help representative. I was told to call Jessie at [phone] for precerts for this pt. No precert required per Miss Erma Mcmillan at [facility]. Ref #[id number]. [facility] notified per fax.

## 2018-02-15 DIAGNOSIS — R09.89 PULMONARY VASCULAR CONGESTION: ICD-10-CM

## 2018-02-15 DIAGNOSIS — I10 ESSENTIAL HYPERTENSION: ICD-10-CM

## 2018-02-15 RX ORDER — VALSARTAN AND HYDROCHLOROTHIAZIDE 160; 12.5 MG/1; MG/1
TABLET, FILM COATED ORAL
Qty: 30 TABLET | Refills: 3 | Status: SHIPPED | OUTPATIENT
Start: 2018-02-15 | End: 2018-02-27 | Stop reason: ALTCHOICE

## 2018-02-16 ENCOUNTER — TELEPHONE (OUTPATIENT)
Dept: CARDIOLOGY CLINIC | Age: 43
End: 2018-02-16

## 2018-02-27 ENCOUNTER — OFFICE VISIT (OUTPATIENT)
Dept: CARDIOLOGY CLINIC | Age: 43
End: 2018-02-27
Payer: COMMERCIAL

## 2018-02-27 ENCOUNTER — TELEPHONE (OUTPATIENT)
Dept: CARDIOLOGY CLINIC | Age: 43
End: 2018-02-27

## 2018-02-27 VITALS
BODY MASS INDEX: 48.64 KG/M2 | WEIGHT: 315 LBS | SYSTOLIC BLOOD PRESSURE: 122 MMHG | HEART RATE: 76 BPM | DIASTOLIC BLOOD PRESSURE: 74 MMHG

## 2018-02-27 DIAGNOSIS — I50.22 CHRONIC SYSTOLIC CONGESTIVE HEART FAILURE (HCC): Primary | ICD-10-CM

## 2018-02-27 PROCEDURE — 99213 OFFICE O/P EST LOW 20 MIN: CPT | Performed by: INTERNAL MEDICINE

## 2018-02-27 RX ORDER — FUROSEMIDE 20 MG/1
20 TABLET ORAL DAILY
Qty: 30 TABLET | Refills: 3 | Status: SHIPPED | OUTPATIENT
Start: 2018-02-27 | End: 2018-10-30

## 2018-03-19 DIAGNOSIS — I10 ESSENTIAL HYPERTENSION: ICD-10-CM

## 2018-03-19 DIAGNOSIS — R09.89 PULMONARY VASCULAR CONGESTION: ICD-10-CM

## 2018-03-19 RX ORDER — CARVEDILOL 3.12 MG/1
3.12 TABLET ORAL 2 TIMES DAILY
Qty: 180 TABLET | Refills: 3 | Status: SHIPPED | OUTPATIENT
Start: 2018-03-19 | End: 2019-04-04 | Stop reason: SDUPTHER

## 2018-03-27 DIAGNOSIS — G47.33 OBSTRUCTIVE SLEEP APNEA SYNDROME: ICD-10-CM

## 2018-03-27 DIAGNOSIS — E66.01 MORBID OBESITY (HCC): Primary | ICD-10-CM

## 2018-03-27 DIAGNOSIS — I50.9 CONGESTIVE HEART FAILURE, UNSPECIFIED CONGESTIVE HEART FAILURE CHRONICITY, UNSPECIFIED CONGESTIVE HEART FAILURE TYPE: ICD-10-CM

## 2018-05-17 DIAGNOSIS — R73.01 IMPAIRED FASTING GLUCOSE: ICD-10-CM

## 2018-05-17 RX ORDER — METFORMIN HYDROCHLORIDE 500 MG/1
TABLET, EXTENDED RELEASE ORAL
Qty: 60 TABLET | Refills: 3 | Status: SHIPPED | OUTPATIENT
Start: 2018-05-17 | End: 2018-09-19 | Stop reason: SDUPTHER

## 2018-06-08 ENCOUNTER — TELEPHONE (OUTPATIENT)
Dept: CARDIOLOGY CLINIC | Age: 43
End: 2018-06-08

## 2018-06-22 DIAGNOSIS — I10 ESSENTIAL HYPERTENSION: ICD-10-CM

## 2018-06-22 DIAGNOSIS — R09.89 PULMONARY VASCULAR CONGESTION: ICD-10-CM

## 2018-06-25 RX ORDER — VALSARTAN AND HYDROCHLOROTHIAZIDE 160; 12.5 MG/1; MG/1
TABLET, FILM COATED ORAL
Qty: 90 TABLET | Refills: 3 | Status: SHIPPED | OUTPATIENT
Start: 2018-06-25 | End: 2018-09-11

## 2018-06-25 RX ORDER — CARVEDILOL 3.12 MG/1
3.12 TABLET ORAL 2 TIMES DAILY
Qty: 180 TABLET | Refills: 3 | Status: CANCELLED | OUTPATIENT
Start: 2018-06-25

## 2018-06-26 ENCOUNTER — OFFICE VISIT (OUTPATIENT)
Dept: CARDIOLOGY CLINIC | Age: 43
End: 2018-06-26
Payer: COMMERCIAL

## 2018-06-26 VITALS
BODY MASS INDEX: 48.78 KG/M2 | HEART RATE: 76 BPM | WEIGHT: 315 LBS | SYSTOLIC BLOOD PRESSURE: 138 MMHG | DIASTOLIC BLOOD PRESSURE: 84 MMHG

## 2018-06-26 DIAGNOSIS — I50.22 CHRONIC SYSTOLIC CONGESTIVE HEART FAILURE (HCC): Primary | ICD-10-CM

## 2018-06-26 PROCEDURE — 99213 OFFICE O/P EST LOW 20 MIN: CPT | Performed by: INTERNAL MEDICINE

## 2018-06-26 RX ORDER — SPIRONOLACTONE 25 MG/1
25 TABLET ORAL DAILY
COMMUNITY
Start: 2018-06-17 | End: 2018-12-31 | Stop reason: SDUPTHER

## 2018-06-26 RX ORDER — CARVEDILOL 3.12 MG/1
3.12 TABLET ORAL 2 TIMES DAILY WITH MEALS
Qty: 180 TABLET | Refills: 3 | Status: SHIPPED | OUTPATIENT
Start: 2018-06-26 | End: 2018-10-30 | Stop reason: ALTCHOICE

## 2018-07-30 DIAGNOSIS — G47.33 OBSTRUCTIVE SLEEP APNEA SYNDROME: ICD-10-CM

## 2018-07-30 DIAGNOSIS — E66.01 MORBID OBESITY (HCC): ICD-10-CM

## 2018-07-30 DIAGNOSIS — I50.9 CONGESTIVE HEART FAILURE, UNSPECIFIED CONGESTIVE HEART FAILURE CHRONICITY, UNSPECIFIED CONGESTIVE HEART FAILURE TYPE: ICD-10-CM

## 2018-07-31 ENCOUNTER — TELEPHONE (OUTPATIENT)
Dept: CARDIOLOGY CLINIC | Age: 43
End: 2018-07-31

## 2018-09-11 ENCOUNTER — TELEPHONE (OUTPATIENT)
Dept: CARDIOLOGY CLINIC | Age: 43
End: 2018-09-11

## 2018-09-11 RX ORDER — LOSARTAN POTASSIUM AND HYDROCHLOROTHIAZIDE 12.5; 5 MG/1; MG/1
1 TABLET ORAL DAILY
Qty: 90 TABLET | Refills: 3 | Status: SHIPPED | OUTPATIENT
Start: 2018-09-11 | End: 2019-09-19 | Stop reason: SDUPTHER

## 2018-09-11 RX ORDER — LOSARTAN POTASSIUM AND HYDROCHLOROTHIAZIDE 12.5; 5 MG/1; MG/1
1 TABLET ORAL DAILY
Qty: 90 TABLET | Refills: 3 | Status: SHIPPED | OUTPATIENT
Start: 2018-09-11 | End: 2018-09-11 | Stop reason: SDUPTHER

## 2018-09-11 NOTE — TELEPHONE ENCOUNTER
Pt called stating his pharmacy had faxed a request for a new prescription since pt valsartan had been recalled. No response as of yet. Please send new med prescription to the [facility].     Last Appt:  6/26/2018  Next Appt:   10/30/2018  Med verified in [address] [place] [address]

## 2018-09-19 DIAGNOSIS — R73.01 IMPAIRED FASTING GLUCOSE: ICD-10-CM

## 2018-09-19 RX ORDER — METFORMIN HYDROCHLORIDE 500 MG/1
TABLET, EXTENDED RELEASE ORAL
Qty: 60 TABLET | Refills: 3 | Status: SHIPPED | OUTPATIENT
Start: 2018-09-19 | End: 2019-04-05 | Stop reason: SDUPTHER

## 2018-09-21 ENCOUNTER — TELEPHONE (OUTPATIENT)
Dept: CARDIOLOGY CLINIC | Age: 43
End: 2018-09-21

## 2018-09-21 NOTE — TELEPHONE ENCOUNTER
Pt has an infected tooth and his dentist wants ti give antibiotics. Pt calling to be sure it will be ok to take them with his cardiac medications. Please call to advise so he can get them started.     [phone]

## 2018-10-30 ENCOUNTER — OFFICE VISIT (OUTPATIENT)
Dept: CARDIOLOGY CLINIC | Age: 43
End: 2018-10-30
Payer: COMMERCIAL

## 2018-10-30 VITALS
DIASTOLIC BLOOD PRESSURE: 68 MMHG | SYSTOLIC BLOOD PRESSURE: 118 MMHG | WEIGHT: 315 LBS | HEART RATE: 92 BPM | BODY MASS INDEX: 48.93 KG/M2

## 2018-10-30 DIAGNOSIS — I50.22 CHRONIC SYSTOLIC CONGESTIVE HEART FAILURE (HCC): Primary | ICD-10-CM

## 2018-10-30 PROCEDURE — 99213 OFFICE O/P EST LOW 20 MIN: CPT | Performed by: INTERNAL MEDICINE

## 2018-12-31 RX ORDER — SPIRONOLACTONE 25 MG/1
25 TABLET ORAL DAILY
Qty: 90 TABLET | Refills: 0 | Status: SHIPPED | OUTPATIENT
Start: 2018-12-31 | End: 2019-04-04 | Stop reason: SDUPTHER

## 2019-04-04 RX ORDER — SPIRONOLACTONE 25 MG/1
TABLET ORAL
Qty: 90 TABLET | Refills: 3 | Status: SHIPPED | OUTPATIENT
Start: 2019-04-04 | End: 2020-03-13 | Stop reason: SDUPTHER

## 2019-04-05 DIAGNOSIS — R73.01 IMPAIRED FASTING GLUCOSE: ICD-10-CM

## 2019-04-05 RX ORDER — CARVEDILOL 3.12 MG/1
TABLET ORAL
Qty: 180 TABLET | Refills: 3 | Status: SHIPPED | OUTPATIENT
Start: 2019-04-05 | End: 2020-03-13

## 2019-04-05 RX ORDER — METFORMIN HYDROCHLORIDE 500 MG/1
TABLET, EXTENDED RELEASE ORAL
Qty: 60 TABLET | Refills: 0 | Status: SHIPPED | OUTPATIENT
Start: 2019-04-05 | End: 2019-04-24

## 2019-04-05 NOTE — TELEPHONE ENCOUNTER
Mark Miranda is calling to request a refill on the following medication(s):  Requested Prescriptions     Pending Prescriptions Disp Refills    metFORMIN (GLUCOPHAGE-XR) 500 MG extended release tablet 60 tablet 3     Sig: take 2 tablets by mouth daily with BREAKFAST       Last Visit Date (If Applicable):  2/41/7158    Next Visit Date:    Visit date not found    I called and left message for patient to schedule an appointment prior to next refill.

## 2019-04-24 ENCOUNTER — OFFICE VISIT (OUTPATIENT)
Dept: FAMILY MEDICINE CLINIC | Age: 44
End: 2019-04-24
Payer: COMMERCIAL

## 2019-04-24 VITALS
SYSTOLIC BLOOD PRESSURE: 124 MMHG | BODY MASS INDEX: 44.1 KG/M2 | WEIGHT: 315 LBS | OXYGEN SATURATION: 96 % | HEART RATE: 81 BPM | HEIGHT: 71 IN | DIASTOLIC BLOOD PRESSURE: 82 MMHG

## 2019-04-24 DIAGNOSIS — E66.01 MORBID OBESITY (HCC): ICD-10-CM

## 2019-04-24 DIAGNOSIS — Z72.0 TOBACCO ABUSE: ICD-10-CM

## 2019-04-24 DIAGNOSIS — I10 ESSENTIAL HYPERTENSION: ICD-10-CM

## 2019-04-24 DIAGNOSIS — Z13.220 SCREENING FOR HYPERLIPIDEMIA: ICD-10-CM

## 2019-04-24 DIAGNOSIS — G47.33 OBSTRUCTIVE SLEEP APNEA SYNDROME: ICD-10-CM

## 2019-04-24 DIAGNOSIS — F41.8 DEPRESSION WITH ANXIETY: ICD-10-CM

## 2019-04-24 DIAGNOSIS — E11.9 TYPE 2 DIABETES MELLITUS WITHOUT COMPLICATION, WITHOUT LONG-TERM CURRENT USE OF INSULIN (HCC): Primary | ICD-10-CM

## 2019-04-24 DIAGNOSIS — I50.22 CHRONIC SYSTOLIC CONGESTIVE HEART FAILURE (HCC): ICD-10-CM

## 2019-04-24 LAB — HBA1C MFR BLD: 7.1 %

## 2019-04-24 PROCEDURE — 83036 HEMOGLOBIN GLYCOSYLATED A1C: CPT | Performed by: NURSE PRACTITIONER

## 2019-04-24 PROCEDURE — 99214 OFFICE O/P EST MOD 30 MIN: CPT | Performed by: NURSE PRACTITIONER

## 2019-04-24 RX ORDER — LANCETS 30 GAUGE
EACH MISCELLANEOUS
Qty: 100 EACH | Refills: 5 | Status: SHIPPED | OUTPATIENT
Start: 2019-04-24

## 2019-04-24 RX ORDER — GLUCOSAMINE HCL/CHONDROITIN SU 500-400 MG
CAPSULE ORAL
Qty: 100 STRIP | Refills: 5 | Status: SHIPPED | OUTPATIENT
Start: 2019-04-24

## 2019-04-24 RX ORDER — ATORVASTATIN CALCIUM 20 MG/1
20 TABLET, FILM COATED ORAL DAILY
Qty: 30 TABLET | Refills: 5 | Status: SHIPPED | OUTPATIENT
Start: 2019-04-24 | End: 2019-10-19 | Stop reason: SDUPTHER

## 2019-04-24 RX ORDER — METFORMIN HYDROCHLORIDE 500 MG/1
TABLET, EXTENDED RELEASE ORAL
Qty: 120 TABLET | Refills: 5 | Status: SHIPPED | OUTPATIENT
Start: 2019-04-24 | End: 2019-10-19 | Stop reason: SDUPTHER

## 2019-04-24 ASSESSMENT — ENCOUNTER SYMPTOMS
WHEEZING: 0
DIARRHEA: 0
CONSTIPATION: 0
SHORTNESS OF BREATH: 0
COUGH: 0
ABDOMINAL PAIN: 0

## 2019-04-24 ASSESSMENT — PATIENT HEALTH QUESTIONNAIRE - PHQ9
1. LITTLE INTEREST OR PLEASURE IN DOING THINGS: 0
2. FEELING DOWN, DEPRESSED OR HOPELESS: 0
SUM OF ALL RESPONSES TO PHQ9 QUESTIONS 1 & 2: 0
SUM OF ALL RESPONSES TO PHQ QUESTIONS 1-9: 0
SUM OF ALL RESPONSES TO PHQ QUESTIONS 1-9: 0

## 2019-04-24 NOTE — PROGRESS NOTES
[address] [address] [address]  Dept: [phone]  Dept Fax: [phone]    Chief Complaint   Patient presents with    6 Month Follow-Up       Subjective:   Patient presents for follow-up of diabetes, and hypertension. He indicates that he is feeling well and denies any symptoms referable to his elevated blood pressure or diabetes. Specifically denies chest pain, palpitations, dyspnea, peripheral edema, thirst, frequent urination, and blurred vision. Current medication regimen is as listed below. He denies any side effects of medication, and has been compliant, taking it regularly. Home glucose readings are not monitored at home. Diabetic complications include: none    Uses Cpap, smokes 1 ppd    Walks [address] steps daily, wears Fitbit. Not watching diet, drinks 3-4 20 oz bottles of water daily. The 10-year ASCVD risk score (Jeronimo Madison, et al., 2013) is: 18.7%    Values used to calculate the score:      Age: 38 years      Sex: Male      Is Non- : No      Diabetic: Yes      Tobacco smoker: Yes      Systolic Blood Pressure: 598 mmHg      Is BP treated: Yes      HDL Cholesterol: 26 mg/dL      Total Cholesterol: 182 mg/dL      BP Readings from Last 3 Encounters:   04/24/19 124/82   10/30/18 118/68   06/26/18 138/84       Wt Readings from Last 3 Encounters:   04/24/19 (!) 357 lb (161.9 kg)   10/30/18 (!) 341 lb (154.7 kg)   06/26/18 (!) 340 lb (154.2 kg)     Past Medical History:   Diagnosis Date    Abnormal stress test 10/30/2017    CHF (congestive heart failure) ([facility] [address] 2/8/2018    Depression with anxiety     pt having hard time since quitting smoking March 2013.  Glucose intolerance (impaired glucose tolerance)     Insomnia     Works 3rd shift.  Morbid obesity (HCC)     Sinusitis, chronic     Tobacco abuse     Unspecified sleep apnea     Suspected. Pt declines sleep study at this time.     Varicose vein     left leg,thigh region    Venous insufficiency      Patient Active Problem List    Diagnosis Date Noted    Essential hypertension 04/24/2019    CHF (congestive heart failure) ([facility] [address] 02/08/2018    Impaired fasting glucose 01/10/2018    Depression with anxiety     Insomnia     Sinusitis, chronic     Venous insufficiency     Varicose vein     Morbid obesity (HCC)     Sleep apnea     Glucose intolerance (impaired glucose tolerance)     Tobacco abuse      Past Surgical History:   Procedure Laterality Date    CARDIAC CATHETERIZATION  11/22/2017     Family History   Problem Relation Age of Onset    Other Mother         posttraumatic stress disorder,battered wife syndrome    Cervical Cancer Mother     Other Father         Hepatitis C    Diabetes Maternal Grandfather     Other Daughter         adhd     Social History     Socioeconomic History    Marital status: Single     Spouse name: None    Number of children: None    Years of education: None    Highest education level: None   Occupational History    Occupation:      Employer: [employer]   Social Needs    Financial resource strain: None    Food insecurity:     Worry: None     Inability: None    Transportation needs:     Medical: None     Non-medical: None   Tobacco Use    Smoking status: Current Every Day Smoker     Packs/day: 1.00     Years: 19.00     Pack years: 19.00     Types: Cigarettes    Smokeless tobacco: Never Used    Tobacco comment: Quit in March 2013   Substance and Sexual Activity    Alcohol use: Yes     Comment: Rare    Drug use: No    Sexual activity: None   Lifestyle    Physical activity:     Days per week: None     Minutes per session: None    Stress: None   Relationships    Social connections:     Talks on phone: None     Gets together: None     Attends Christian service: None     Active member of club or organization: None     Attends meetings of clubs or organizations: None     Relationship status: None    Intimate partner violence:     Fear of current or ex partner: None     Emotionally abused: None     Physically abused: None     Forced sexual activity: None   Other Topics Concern    None   Social History Narrative    None     Health Maintenance Due   Topic Date Due    Diabetic foot exam  01/24/1986    Diabetic retinal exam  01/24/1986    Hepatitis B Vaccine (1 of 3 - Risk 3-dose series) 01/24/1995    Potassium monitoring  11/22/2018    Creatinine monitoring  11/22/2018    Diabetic microalbuminuria test  01/10/2019    Lipid screen  01/10/2019     Current Outpatient Medications   Medication Sig Dispense Refill    metFORMIN (GLUCOPHAGE-XR) 500 MG extended release tablet take 2 tablets by mouth 2 times daily with meals. 120 tablet 5    atorvastatin (LIPITOR) 20 MG tablet Take 1 tablet by mouth daily 30 tablet 5    blood glucose monitor kit and supplies Test 2 times a day & as needed for symptoms of irregular blood glucose. 1 kit 0    blood glucose monitor strips Use 2 times daily to test sugars 100 strip 5    Lancets MISC Use 2 times daily 100 each 5    carvedilol (COREG) 3.125 MG tablet take 1 tablet by mouth twice a day (WITH MEALS) 180 tablet 3    spironolactone (ALDACTONE) 25 MG tablet TAKE 1 TABLET ONCE A DAY 90 tablet 3    losartan-hydrochlorothiazide (HYZAAR) 50-12.5 MG per tablet Take 1 tablet by mouth daily 90 tablet 3    aspirin 81 MG tablet Take 81 mg by mouth daily      Acetaminophen (TYLENOL PO) Take  by mouth as needed. No current facility-administered medications for this visit. No Known Allergies    Review of Systems  Review of Systems   Constitutional: Negative for chills, diaphoresis and fever. HENT: Negative. Respiratory: Negative for cough, shortness of breath and wheezing. Cardiovascular: Negative for chest pain, palpitations and leg swelling. Gastrointestinal: Negative for abdominal pain, constipation and diarrhea.    Endocrine: Negative for cold intolerance, heat intolerance, polydipsia, polyphagia and polyuria. Genitourinary: Negative. Musculoskeletal: Negative for myalgias. Neurological: Negative for dizziness and headaches. Psychiatric/Behavioral: Negative for dysphoric mood and sleep disturbance. The patient is not nervous/anxious. Objective:     Vitals:    04/24/19 0926   BP: 124/82   Site: Left Upper Arm   Position: Sitting   Pulse: 81   SpO2: 96%   Weight: (!) 357 lb (161.9 kg)   Height: 5' 11\" (1.803 m)     Body mass index is 49.79 kg/m². Physical Exam   Constitutional: He is oriented to person, place, and time. He appears well-developed and well-nourished. HENT:   Head: Normocephalic. Right Ear: Tympanic membrane normal.   Left Ear: Tympanic membrane normal.   Nose: Nose normal.   Eyes: Pupils are equal, round, and reactive to light. Conjunctivae are normal.   Neck: Neck supple. No JVD present. Carotid bruit is not present. No thyromegaly present. Cardiovascular: Normal rate, regular rhythm, normal heart sounds and intact distal pulses. Pulmonary/Chest: Effort normal and breath sounds normal. No respiratory distress. He has no wheezes. Abdominal: Soft. Bowel sounds are normal. There is no tenderness. Musculoskeletal: He exhibits no edema. Lymphadenopathy:     He has no cervical adenopathy. Neurological: He is alert and oriented to person, place, and time. Psychiatric: He has a normal mood and affect.         PHQ Scores 4/24/2019 10/5/2017   PHQ2 Score 0 0   PHQ9 Score 0 0     Interpretation of Total Score Depression Severity: 1-4 = Minimal depression, 5-9 = Mild depression, 10-14 = Moderate depression, 15-19 = Moderately severe depression, 20-27 = Severe depression    Lab Results   Component Value Date    LABA1C 7.1 04/24/2019    LABA1C 6.1 01/10/2018    LABA1C 6.4 10/05/2017     Lab Results   Component Value Date    LABMICR 1.3 01/10/2018    LDLCALC 125.0 01/10/2018    CREATININE 0.50 (L) 11/22/2017 Assessment:      Diagnosis Orders   1. Type 2 diabetes mellitus without complication, without long-term current use of insulin ([facility])  metFORMIN (GLUCOPHAGE-XR) 500 MG extended release tablet    atorvastatin (LIPITOR) 20 MG tablet    blood glucose monitor kit and supplies    blood glucose monitor strips    Lancets MISC   2. Essential hypertension  Lipid, Fasting    Microalbumin, Ur    Comprehensive Metabolic Panel, Fasting   3. Screening for hyperlipidemia     4. Morbid obesity (HCC)  Lipid, Fasting    TSH without Reflex   5. Depression with anxiety  TSH without Reflex    Comprehensive Metabolic Panel, Fasting   6. Obstructive sleep apnea syndrome     7. Chronic systolic congestive heart failure ([facility] [address]     8. Tobacco abuse          Plan:     Orders Placed This Encounter   Procedures    Lipid, Fasting     Standing Status:   Future     Standing Expiration Date:   4/23/2020    Microalbumin, Ur     Standing Status:   Future     Standing Expiration Date:   4/24/2020    TSH without Reflex     Standing Status:   Future     Standing Expiration Date:   4/24/2020    Comprehensive Metabolic Panel, Fasting     Standing Status:   Future     Standing Expiration Date:   4/23/2020    POCT glycosylated hemoglobin (Hb A1C)     Orders Placed This Encounter   Medications    metFORMIN (GLUCOPHAGE-XR) 500 MG extended release tablet     Sig: take 2 tablets by mouth 2 times daily with meals. Dispense:  120 tablet     Refill:  5    atorvastatin (LIPITOR) 20 MG tablet     Sig: Take 1 tablet by mouth daily     Dispense:  30 tablet     Refill:  5    blood glucose monitor kit and supplies     Sig: Test 2 times a day & as needed for symptoms of irregular blood glucose. Dispense:  1 kit     Refill:  0     Brand per patient preference. May round up to next available package size.     blood glucose monitor strips     Sig: Use 2 times daily to test sugars     Dispense:  100 strip     Refill:  5    Lancets MISC     Sig: Use 2 times daily     Dispense:  100 each     Refill:  5     Medication: increase metformin to 1000 mg bid with meals. Dietary sodium restriction. Regular aerobic exercise. Check blood pressures daily and record. Lengthy discussion regarding diabetes, weight loss, diet, and smoking cessation. Offered referral to diabetic educator, or dietician- he will think about it. Instructed to monitor BS and BP at home and keep a log to bring to follow up in 3 months. Will recheck lipid and liver enzymes at follow up to check response and tolerance to therapy. Discussed use, benefit, and side effects of prescribed medications. All patient questions answered. Pt voiced understanding. Health Maintenance reviewed - refused pneumonia and Tdap vaccine. Pneumonia refusal signed- see media. Instructed to continue current medications, diet and exercise. Patient agreed with treatment plan. Follow up as directed. Return in about 3 months (around 7/24/2019).     Electronically signed by KLAUDIA Vance CNP on 4/24/2019

## 2019-05-07 ENCOUNTER — OFFICE VISIT (OUTPATIENT)
Dept: CARDIOLOGY CLINIC | Age: 44
End: 2019-05-07
Payer: COMMERCIAL

## 2019-05-07 ENCOUNTER — TELEPHONE (OUTPATIENT)
Dept: CARDIOLOGY CLINIC | Age: 44
End: 2019-05-07

## 2019-05-07 VITALS
SYSTOLIC BLOOD PRESSURE: 124 MMHG | HEART RATE: 80 BPM | DIASTOLIC BLOOD PRESSURE: 80 MMHG | BODY MASS INDEX: 49.51 KG/M2 | WEIGHT: 315 LBS

## 2019-05-07 DIAGNOSIS — G47.33 OSA ON CPAP: ICD-10-CM

## 2019-05-07 DIAGNOSIS — I10 ESSENTIAL HYPERTENSION: ICD-10-CM

## 2019-05-07 DIAGNOSIS — Z99.89 OSA ON CPAP: ICD-10-CM

## 2019-05-07 DIAGNOSIS — I50.22 CHRONIC SYSTOLIC CONGESTIVE HEART FAILURE (HCC): Primary | ICD-10-CM

## 2019-05-07 DIAGNOSIS — I42.8 NICM (NONISCHEMIC CARDIOMYOPATHY) (HCC): ICD-10-CM

## 2019-05-07 DIAGNOSIS — E78.2 MIXED HYPERLIPIDEMIA: ICD-10-CM

## 2019-05-07 DIAGNOSIS — R00.2 PALPITATIONS: ICD-10-CM

## 2019-05-07 DIAGNOSIS — E66.01 MORBID OBESITY (HCC): ICD-10-CM

## 2019-05-07 PROCEDURE — 99214 OFFICE O/P EST MOD 30 MIN: CPT | Performed by: INTERNAL MEDICINE

## 2019-05-07 NOTE — PROGRESS NOTES
Today's Date: 5/7/2019  Patient Name: Mark Miranda  Patient's age: 38 y.o., 1975          The patient is a 38 y.o.  male is in the office for f/u, Patient has been doing well cardiac wise, no new cardiac complaints. He denies angina, PND/Orthopnea. Has gained some weight. Past Medical History:   has a past medical history of Abnormal stress test, CHF (congestive heart failure) ([facility] [address] Depression with anxiety, Glucose intolerance (impaired glucose tolerance), Insomnia, Morbid obesity ([facility] [address] Sinusitis, chronic, Tobacco abuse, Unspecified sleep apnea, Varicose vein, and Venous insufficiency. Past Surgical History:   has a past surgical history that includes Cardiac catheterization (11/22/2017). Home Medications:    Prior to Admission medications    Medication Sig Start Date End Date Taking? Authorizing Provider   metFORMIN (GLUCOPHAGE-XR) 500 MG extended release tablet take 2 tablets by mouth 2 times daily with meals. 4/24/19  Yes KLAUDIA Gibson CNP   atorvastatin (LIPITOR) 20 MG tablet Take 1 tablet by mouth daily 4/24/19  Yes KLAUDIA Gibson CNP   blood glucose monitor kit and supplies Test 2 times a day & as needed for symptoms of irregular blood glucose. 4/24/19  Yes KLAUDIA Gibson CNP   blood glucose monitor strips Use 2 times daily to test sugars 4/24/19  Yes KLAUDIA Gibson CNP   Lancets MISC Use 2 times daily 4/24/19  Yes KLAUDIA Gibson CNP   carvedilol (COREG) 3.125 MG tablet take 1 tablet by mouth twice a day (WITH MEALS) 4/5/19  Yes Ace Adair MD   spironolactone (ALDACTONE) 25 MG tablet TAKE 1 TABLET ONCE A DAY 4/4/19  Yes Ace Adair MD   losartan-hydrochlorothiazide (HYZAAR) 50-12.5 MG per tablet Take 1 tablet by mouth daily 9/11/18  Yes Ace Adair MD   aspirin 81 MG tablet Take 81 mg by mouth daily   Yes Historical Provider, MD   Acetaminophen (TYLENOL PO) Take  by mouth as needed.    Yes Historical

## 2019-05-10 ENCOUNTER — TELEPHONE (OUTPATIENT)
Dept: CARDIOLOGY CLINIC | Age: 44
End: 2019-05-10

## 2019-05-14 DIAGNOSIS — E66.01 MORBID OBESITY (HCC): ICD-10-CM

## 2019-05-14 DIAGNOSIS — I42.8 NICM (NONISCHEMIC CARDIOMYOPATHY) (HCC): ICD-10-CM

## 2019-05-14 DIAGNOSIS — I50.22 CHRONIC SYSTOLIC CONGESTIVE HEART FAILURE (HCC): ICD-10-CM

## 2019-05-14 DIAGNOSIS — I10 ESSENTIAL HYPERTENSION: ICD-10-CM

## 2019-05-14 NOTE — TELEPHONE ENCOUNTER
LM to notify pt of echo results, requesting either to schedule a sooner appt with Dr. Elizabeth Cummings on Fri or Dr. Dany Farrell next week. Or we can wait for 's review and recommendation.

## 2019-05-14 NOTE — TELEPHONE ENCOUNTER
LM for pt to call for echo results. EF is more reduced at 30-35% (was 35-40%). Please schedule pt sooner at [facility] office.

## 2019-05-17 ENCOUNTER — TELEPHONE (OUTPATIENT)
Dept: CARDIOLOGY CLINIC | Age: 44
End: 2019-05-17

## 2019-05-17 ENCOUNTER — OFFICE VISIT (OUTPATIENT)
Dept: CARDIOLOGY CLINIC | Age: 44
End: 2019-05-17
Payer: COMMERCIAL

## 2019-05-17 VITALS
BODY MASS INDEX: 50.24 KG/M2 | DIASTOLIC BLOOD PRESSURE: 70 MMHG | HEART RATE: 80 BPM | WEIGHT: 315 LBS | SYSTOLIC BLOOD PRESSURE: 124 MMHG

## 2019-05-17 DIAGNOSIS — I50.22 CHRONIC SYSTOLIC CONGESTIVE HEART FAILURE (HCC): Primary | ICD-10-CM

## 2019-05-17 PROCEDURE — 99213 OFFICE O/P EST LOW 20 MIN: CPT | Performed by: INTERNAL MEDICINE

## 2019-05-17 NOTE — TELEPHONE ENCOUNTER
CPT 55421 MUGA--No precert required per [employer] Case #[id number] per Ms. Keith. I did check with [employer] and I was told Magalie Naylor does the authorizations for this code. Ref #[id number]. [facility] radiology notified per fax.

## 2019-05-17 NOTE — PROGRESS NOTES
Today's Date: 5/17/2019  Patient Name: Yelitza Torres  Patient's age: 38 y.o., 1975          The patient is a 38 y.o.  male is in the office for f/u, he had an echo 5/10/2019 which showed LVEF still 30-35%. He had no new c/o. Past Medical History:   has a past medical history of Abnormal stress test, CHF (congestive heart failure) ([facility] [address] Depression with anxiety, Glucose intolerance (impaired glucose tolerance), Insomnia, Morbid obesity ([facility] [address] Sinusitis, chronic, Tobacco abuse, Unspecified sleep apnea, Varicose vein, and Venous insufficiency. Past Surgical History:   has a past surgical history that includes Cardiac catheterization (11/22/2017). Home Medications:    Prior to Admission medications    Medication Sig Start Date End Date Taking? Authorizing Provider   metFORMIN (GLUCOPHAGE-XR) 500 MG extended release tablet take 2 tablets by mouth 2 times daily with meals. 4/24/19  Yes KLAUDIA Tubbs CNP   atorvastatin (LIPITOR) 20 MG tablet Take 1 tablet by mouth daily 4/24/19  Yes KLAUDIA Tubbs CNP   blood glucose monitor kit and supplies Test 2 times a day & as needed for symptoms of irregular blood glucose. 4/24/19  Yes KLAUDIA Tubbs CNP   blood glucose monitor strips Use 2 times daily to test sugars 4/24/19  Yes KLAUDIA Tubbs CNP   Lancets MISC Use 2 times daily 4/24/19  Yes KLAUDIA Tubbs CNP   carvedilol (COREG) 3.125 MG tablet take 1 tablet by mouth twice a day (WITH MEALS) 4/5/19  Yes Janice Jerez MD   spironolactone (ALDACTONE) 25 MG tablet TAKE 1 TABLET ONCE A DAY 4/4/19  Yes Janice Jerez MD   losartan-hydrochlorothiazide (HYZAAR) 50-12.5 MG per tablet Take 1 tablet by mouth daily 9/11/18  Yes Janice Jerez MD   aspirin 81 MG tablet Take 81 mg by mouth daily   Yes Historical Provider, MD   Acetaminophen (TYLENOL PO) Take  by mouth as needed.    Yes Historical Provider, MD       Allergies:  Patient has no known allergies. Social History:   reports that he has been smoking cigarettes. He has a 19.00 pack-year smoking history. He has never used smokeless tobacco. He reports that he drinks alcohol. He reports that he does not use drugs. REVIEW OF SYSTEMS:  CONSTITUTIONAL:NEGATIVE  HEENT:NEG  Cardiovascular: No chest pain, Yes dyspnea on exertion, No palpitations. Lower extremity edema: Yes  RESPIRATORY: MORENO  GASTROINTESTINAL:  negative  GENITOURINARY:  negative  INTEGUMENT:  negative  MUSCULOSKELETAL:  positive for  pain  NEUROLOGICAL:  negative    PHYSICAL EXAM:      /70   Pulse 80   Wt (!) 360 lb 3.2 oz (163.4 kg)   BMI 50.24 kg/m²    HEENT: PERRL, no cervical lymphadenopathy. No masses palpable. Cardiovascular:  · The apical impulse is not displaced  · Heart  Sounds:RRR, NO S3/RUB  · Jugular venous pulsation Normal  · The carotid upstroke is NL  · Peripheral pulses are symmetrical and full  Respiratory: Good respiratory effort. On auscultation: clear to auscultation bilaterally  Abdomen:  · No masses or tenderness  · Bowel sounds present  Extremities:  ·  No Cyanosis or Clubbing  ·  Lower extremity edema: Yes  Skin: Warm and dry    Cardiac data:      Labs:     CBC: No results for input(s): WBC, HGB, HCT, PLT in the last 72 hours. BMP: No results for input(s): NA, K, CO2, BUN, CREATININE, LABGLOM, GLUCOSE in the last 72 hours. PT/INR: No results for input(s): PROTIME, INR in the last 72 hours. FASTING LIPID PANEL:  Lab Results   Component Value Date    LDLCALC 125.0 01/10/2018    TRIG 155 01/10/2018     LIVER PROFILE:No results for input(s): AST, ALT, LABALBU in the last 72 hours. IMPRESSION:    Systolic CHF, nonischemic CMP.  COMPENSATED  KADE  Morbid obesity  HTN   TOBACCO ABUSE  Patient Active Problem List   Diagnosis    Insomnia    Sinusitis, chronic    Venous insufficiency    Varicose vein    Morbid obesity (HCC)    Sleep apnea    Glucose intolerance (impaired glucose tolerance)    Tobacco abuse    Depression with anxiety    Impaired fasting glucose    CHF (congestive heart failure) (HCC)    Essential hypertension       RECOMMENDATIONS:  D/W PT ICD, PROS AND CONS EXPLAINED TO PT HE WANTS TO CONSIDER  IT  WILL GET A MUGA SCAN TO BETTER ESTIMATE LVEF   WEIGHT LOSS  CONTINUE CURRENT TREATMENT    RTC 3 MONTH          MD Najma Carpenter [facility]           [phone]

## 2019-05-22 NOTE — TELEPHONE ENCOUNTER
Alexx lopez from scheduling. Patient would like to cancel his MUGA appt.   If any questions please call the pt

## 2019-08-30 ENCOUNTER — OFFICE VISIT (OUTPATIENT)
Dept: CARDIOLOGY CLINIC | Age: 44
End: 2019-08-30
Payer: COMMERCIAL

## 2019-08-30 VITALS
DIASTOLIC BLOOD PRESSURE: 74 MMHG | BODY MASS INDEX: 48.73 KG/M2 | WEIGHT: 315 LBS | SYSTOLIC BLOOD PRESSURE: 132 MMHG | HEART RATE: 84 BPM

## 2019-08-30 DIAGNOSIS — E78.2 MIXED HYPERLIPIDEMIA: ICD-10-CM

## 2019-08-30 DIAGNOSIS — Z71.6 TOBACCO ABUSE COUNSELING: ICD-10-CM

## 2019-08-30 DIAGNOSIS — I50.22 CHRONIC SYSTOLIC HEART FAILURE (HCC): ICD-10-CM

## 2019-08-30 DIAGNOSIS — I25.5 ISCHEMIC CARDIOMYOPATHY: Primary | ICD-10-CM

## 2019-08-30 DIAGNOSIS — I10 ESSENTIAL HYPERTENSION: ICD-10-CM

## 2019-08-30 DIAGNOSIS — R06.02 SHORTNESS OF BREATH: ICD-10-CM

## 2019-08-30 DIAGNOSIS — E66.09 OBESITY DUE TO EXCESS CALORIES WITH SERIOUS COMORBIDITY IN PEDIATRIC PATIENT, UNSPECIFIED BMI: ICD-10-CM

## 2019-08-30 PROCEDURE — 99214 OFFICE O/P EST MOD 30 MIN: CPT | Performed by: INTERNAL MEDICINE

## 2019-08-30 NOTE — PROGRESS NOTES
anxiety    Impaired fasting glucose    CHF (congestive heart failure) ([facility] [address]    Essential hypertension     Echo: 5/10/19  EF 30-35%      IMPRESSION & RECOMMENDATIONS:    Systolic CHF, nonischemic CMP. COMPENSATED. Start cardiac rehab, recheck echo. Did not want muga or icd at this time, wants to see if EF improves with rehab. KADE  Morbid obesity  HTN   TOBACCO ABUSE  D/W PT ICD, PROS AND CONS EXPLAINED TO PT HE WANTS TO try rehab  WEIGHT LOSS  CONTINUE CURRENT TREATMENT      Thank you for allowing me to participate in the care of this patient, please do not hesitate to call if you have any questions. Dragan Mcgraw, [address] [facility]  [facility] [employer]  [phone]

## 2019-09-20 RX ORDER — LOSARTAN POTASSIUM AND HYDROCHLOROTHIAZIDE 12.5; 5 MG/1; MG/1
TABLET ORAL
Qty: 90 TABLET | Refills: 3 | Status: SHIPPED | OUTPATIENT
Start: 2019-09-20 | End: 2020-01-14 | Stop reason: SDUPTHER

## 2019-10-08 ENCOUNTER — TELEPHONE (OUTPATIENT)
Dept: CARDIOLOGY | Age: 44
End: 2019-10-08

## 2019-10-19 DIAGNOSIS — E11.9 TYPE 2 DIABETES MELLITUS WITHOUT COMPLICATION, WITHOUT LONG-TERM CURRENT USE OF INSULIN (HCC): ICD-10-CM

## 2019-10-19 RX ORDER — ATORVASTATIN CALCIUM 20 MG/1
TABLET, FILM COATED ORAL
Qty: 30 TABLET | Refills: 5 | Status: SHIPPED | OUTPATIENT
Start: 2019-10-19 | End: 2020-04-16 | Stop reason: SDUPTHER

## 2019-10-19 RX ORDER — METFORMIN HYDROCHLORIDE 500 MG/1
TABLET, EXTENDED RELEASE ORAL
Qty: 120 TABLET | Refills: 5 | Status: SHIPPED | OUTPATIENT
Start: 2019-10-19 | End: 2020-04-16 | Stop reason: SDUPTHER

## 2020-01-03 RX ORDER — LOSARTAN POTASSIUM 50 MG/1
50 TABLET ORAL DAILY
Qty: 14 TABLET | Refills: 0 | Status: SHIPPED | OUTPATIENT
Start: 2020-01-03 | End: 2020-01-14 | Stop reason: CLARIF

## 2020-01-03 RX ORDER — HYDROCHLOROTHIAZIDE 12.5 MG/1
12.5 CAPSULE, GELATIN COATED ORAL EVERY MORNING
Qty: 14 CAPSULE | Refills: 0 | Status: SHIPPED | OUTPATIENT
Start: 2020-01-03 | End: 2020-01-14 | Stop reason: CLARIF

## 2020-01-03 NOTE — TELEPHONE ENCOUNTER
Please notify patient 14 day supply sent to pharmacy. He needs to schedule follow up appointment, and complete labs ordered 4/2019.

## 2020-01-03 NOTE — TELEPHONE ENCOUNTER
Will Clementine's office do pended med refills while cardiologist's is out of office? Faxed request received from [facility] to separate losartan and HCTZ due to combination unavailable. I spoke to pt. He ran out of the med 1-2 days ago. Says his BP is not running high and doing OK.

## 2020-01-03 NOTE — TELEPHONE ENCOUNTER
Patient not seen in our office since 4/2019. Do you want to fill 30 day while cardiologist is out of office?

## 2020-01-14 ENCOUNTER — OFFICE VISIT (OUTPATIENT)
Dept: CARDIOLOGY CLINIC | Age: 45
End: 2020-01-14
Payer: COMMERCIAL

## 2020-01-14 ENCOUNTER — TELEPHONE (OUTPATIENT)
Dept: CARDIOLOGY CLINIC | Age: 45
End: 2020-01-14

## 2020-01-14 VITALS
WEIGHT: 315 LBS | BODY MASS INDEX: 48.84 KG/M2 | HEART RATE: 76 BPM | DIASTOLIC BLOOD PRESSURE: 86 MMHG | SYSTOLIC BLOOD PRESSURE: 128 MMHG

## 2020-01-14 PROCEDURE — 99214 OFFICE O/P EST MOD 30 MIN: CPT | Performed by: INTERNAL MEDICINE

## 2020-01-14 RX ORDER — LOSARTAN POTASSIUM 50 MG/1
50 TABLET ORAL DAILY
COMMUNITY
End: 2020-01-14 | Stop reason: SDUPTHER

## 2020-01-14 RX ORDER — LOSARTAN POTASSIUM AND HYDROCHLOROTHIAZIDE 12.5; 5 MG/1; MG/1
1 TABLET ORAL DAILY
Qty: 90 TABLET | Refills: 3 | Status: SHIPPED | OUTPATIENT
Start: 2020-01-14 | End: 2021-01-13

## 2020-01-14 RX ORDER — HYDROCHLOROTHIAZIDE 12.5 MG/1
12.5 CAPSULE, GELATIN COATED ORAL DAILY
COMMUNITY
End: 2020-01-14 | Stop reason: SDUPTHER

## 2020-01-14 RX ORDER — HYDROCHLOROTHIAZIDE 12.5 MG/1
12.5 CAPSULE, GELATIN COATED ORAL DAILY
Qty: 90 CAPSULE | Refills: 3 | Status: SHIPPED | OUTPATIENT
Start: 2020-01-14 | End: 2021-11-05 | Stop reason: ALTCHOICE

## 2020-01-14 RX ORDER — LOSARTAN POTASSIUM 50 MG/1
50 TABLET ORAL DAILY
Qty: 90 TABLET | Refills: 3 | Status: SHIPPED | OUTPATIENT
Start: 2020-01-14 | End: 2021-11-05 | Stop reason: ALTCHOICE

## 2020-03-13 RX ORDER — CARVEDILOL 3.12 MG/1
TABLET ORAL
Qty: 180 TABLET | Refills: 3 | Status: SHIPPED | OUTPATIENT
Start: 2020-03-13 | End: 2020-04-07

## 2020-03-13 RX ORDER — SPIRONOLACTONE 25 MG/1
25 TABLET ORAL DAILY
Qty: 90 TABLET | Refills: 3 | Status: SHIPPED | OUTPATIENT
Start: 2020-03-13 | End: 2022-07-07

## 2020-04-07 RX ORDER — CARVEDILOL 3.12 MG/1
TABLET ORAL
Qty: 180 TABLET | Refills: 3 | Status: SHIPPED | OUTPATIENT
Start: 2020-04-07 | End: 2022-01-26

## 2020-04-16 RX ORDER — ATORVASTATIN CALCIUM 20 MG/1
20 TABLET, FILM COATED ORAL DAILY
Qty: 30 TABLET | Refills: 0 | Status: SHIPPED | OUTPATIENT
Start: 2020-04-16 | End: 2020-08-15 | Stop reason: SDUPTHER

## 2020-04-16 RX ORDER — METFORMIN HYDROCHLORIDE 500 MG/1
1000 TABLET, EXTENDED RELEASE ORAL 2 TIMES DAILY
Qty: 120 TABLET | Refills: 0 | Status: SHIPPED | OUTPATIENT
Start: 2020-04-16 | End: 2020-08-14 | Stop reason: SDUPTHER

## 2020-08-14 NOTE — TELEPHONE ENCOUNTER
Valentine Garza is calling to request a refill on the following medication(s):  Requested Prescriptions     Pending Prescriptions Disp Refills    atorvastatin (LIPITOR) 20 MG tablet 30 tablet 0     Sig: Take 1 tablet by mouth daily    metFORMIN (GLUCOPHAGE-XR) 500 MG extended release tablet 120 tablet 0     Sig: Take 2 tablets by mouth 2 times daily       Last Visit Date (If Applicable):  7/35/8702    Next Visit Date:    Left voicemail that he needs an appt

## 2020-08-15 RX ORDER — METFORMIN HYDROCHLORIDE 500 MG/1
1000 TABLET, EXTENDED RELEASE ORAL 2 TIMES DAILY
Qty: 120 TABLET | Refills: 0 | Status: SHIPPED | OUTPATIENT
Start: 2020-08-15 | End: 2021-01-19 | Stop reason: SDUPTHER

## 2020-08-15 RX ORDER — ATORVASTATIN CALCIUM 20 MG/1
20 TABLET, FILM COATED ORAL DAILY
Qty: 30 TABLET | Refills: 0 | Status: SHIPPED | OUTPATIENT
Start: 2020-08-15 | End: 2021-01-19 | Stop reason: SDUPTHER

## 2021-01-13 ENCOUNTER — OFFICE VISIT (OUTPATIENT)
Dept: FAMILY MEDICINE CLINIC | Age: 46
End: 2021-01-13
Payer: COMMERCIAL

## 2021-01-13 ENCOUNTER — HOSPITAL ENCOUNTER (OUTPATIENT)
Age: 46
Setting detail: SPECIMEN
Discharge: HOME | End: 2021-01-13
Payer: COMMERCIAL

## 2021-01-13 VITALS
HEART RATE: 115 BPM | RESPIRATION RATE: 30 BRPM | TEMPERATURE: 99.1 F | DIASTOLIC BLOOD PRESSURE: 72 MMHG | OXYGEN SATURATION: 90 % | WEIGHT: 315 LBS | SYSTOLIC BLOOD PRESSURE: 128 MMHG | BODY MASS INDEX: 48.82 KG/M2

## 2021-01-13 DIAGNOSIS — R06.02 SOB (SHORTNESS OF BREATH): Primary | ICD-10-CM

## 2021-01-13 DIAGNOSIS — R06.02 SOB (SHORTNESS OF BREATH): ICD-10-CM

## 2021-01-13 DIAGNOSIS — I50.22 CHRONIC SYSTOLIC CONGESTIVE HEART FAILURE (HCC): ICD-10-CM

## 2021-01-13 PROCEDURE — U0003 INFECTIOUS AGENT DETECTION BY NUCLEIC ACID (DNA OR RNA); SEVERE ACUTE RESPIRATORY SYNDROME CORONAVIRUS 2 (SARS-COV-2) (CORONAVIRUS DISEASE [COVID-19]), AMPLIFIED PROBE TECHNIQUE, MAKING USE OF HIGH THROUGHPUT TECHNOLOGIES AS DESCRIBED BY CMS-2020-01-R: HCPCS

## 2021-01-13 PROCEDURE — 99213 OFFICE O/P EST LOW 20 MIN: CPT | Performed by: NURSE PRACTITIONER

## 2021-01-13 SDOH — ECONOMIC STABILITY: TRANSPORTATION INSECURITY
IN THE PAST 12 MONTHS, HAS LACK OF TRANSPORTATION KEPT YOU FROM MEETINGS, WORK, OR FROM GETTING THINGS NEEDED FOR DAILY LIVING?: NOT ASKED

## 2021-01-13 SDOH — ECONOMIC STABILITY: FOOD INSECURITY: WITHIN THE PAST 12 MONTHS, YOU WORRIED THAT YOUR FOOD WOULD RUN OUT BEFORE YOU GOT MONEY TO BUY MORE.: NEVER TRUE

## 2021-01-13 SDOH — ECONOMIC STABILITY: TRANSPORTATION INSECURITY
IN THE PAST 12 MONTHS, HAS THE LACK OF TRANSPORTATION KEPT YOU FROM MEDICAL APPOINTMENTS OR FROM GETTING MEDICATIONS?: NOT ASKED

## 2021-01-13 ASSESSMENT — ENCOUNTER SYMPTOMS
RHINORRHEA: 1
SHORTNESS OF BREATH: 1
SPUTUM PRODUCTION: 0
ORTHOPNEA: 1

## 2021-01-13 NOTE — PROGRESS NOTES
[address] [address] [address] [facility], APRN-[facility]  [address]  Phone:  [phone]  Fax:  [phone]  Harvinder Jamil is a 41 y.o. male who presents today for his medical conditions/complaints as noted below. Harvinder Jamil c/o of Cough (Shortness of breath,  Sx started with sinus sx 7-10 days ago. He cannot lie down with out coughing. Nose was bleeding this am.)      HPI:     Shortness of Breath  This is a new problem. The current episode started 1 to 4 weeks ago (10 days). The problem has been gradually worsening. Associated symptoms include orthopnea and rhinorrhea. Pertinent negatives include no chest pain, fever or sputum production. The symptoms are aggravated by any activity. He has tried nothing for the symptoms. His past medical history is significant for a heart failure. Wt Readings from Last 3 Encounters:   01/13/21 (!) 350 lb (158.8 kg)   01/14/20 (!) 350 lb 3.2 oz (158.8 kg)   08/30/19 (!) 349 lb 6.4 oz (158.5 kg)       Temp Readings from Last 3 Encounters:   01/13/21 99.1 °F (37.3 °C)   11/22/17 97.7 °F (36.5 °C) (Oral)   09/22/17 98 °F (36.7 °C)       BP Readings from Last 3 Encounters:   01/13/21 128/72   01/14/20 128/86   08/30/19 132/74       Pulse Readings from Last 3 Encounters:   01/13/21 115   01/14/20 76   08/30/19 84              Past Medical History:   Diagnosis Date    Abnormal stress test 10/30/2017    CHF (congestive heart failure) ([facility] [address] 2/8/2018    Depression with anxiety     pt having hard time since quitting smoking March 2013.  Glucose intolerance (impaired glucose tolerance)     Insomnia     Works 3rd shift.  Morbid obesity (HCC)     Sinusitis, chronic     Tobacco abuse     Unspecified sleep apnea     Suspected. Pt declines sleep study at this time.     Varicose vein     left leg,thigh region    Venous insufficiency       Past Surgical History:   Procedure Laterality Date    CARDIAC CATHETERIZATION  11/22/2017 Family History   Problem Relation Age of Onset    Other Mother         posttraumatic stress disorder,battered wife syndrome    Cervical Cancer Mother     Other Father         Hepatitis C    Diabetes Maternal Grandfather     Other Daughter         adhd     Social History     Tobacco Use    Smoking status: Current Every Day Smoker     Packs/day: 1.00     Years: 19.00     Pack years: 19.00     Types: Cigarettes    Smokeless tobacco: Never Used    Tobacco comment: Quit in March 2013   Substance Use Topics    Alcohol use: Yes     Comment: Rare      Current Outpatient Medications   Medication Sig Dispense Refill    atorvastatin (LIPITOR) 20 MG tablet Take 1 tablet by mouth daily 30 tablet 0    metFORMIN (GLUCOPHAGE-XR) 500 MG extended release tablet Take 2 tablets by mouth 2 times daily 120 tablet 0    carvedilol (COREG) 3.125 MG tablet TAKE 1 TABLET TWICE A DAY WITH MEALS 180 tablet 3    spironolactone (ALDACTONE) 25 MG tablet Take 1 tablet by mouth daily 90 tablet 3    losartan (COZAAR) 50 MG tablet Take 1 tablet by mouth daily 90 tablet 3    hydrochlorothiazide (MICROZIDE) 12.5 MG capsule Take 1 capsule by mouth daily 90 capsule 3    aspirin 81 MG tablet Take 81 mg by mouth daily      Acetaminophen (TYLENOL PO) Take  by mouth as needed.  CPAP Machine MISC by Does not apply route 1 each 1    blood glucose monitor kit and supplies Test 2 times a day & as needed for symptoms of irregular blood glucose. 1 kit 0    blood glucose monitor strips Use 2 times daily to test sugars 100 strip 5    Lancets MISC Use 2 times daily 100 each 5     No current facility-administered medications for this visit. No Known Allergies    No exam data present    Subjective:      Review of Systems   Constitutional: Negative for fever. HENT: Positive for rhinorrhea. Respiratory: Positive for shortness of breath. Negative for sputum production. Cardiovascular: Positive for orthopnea. Negative for chest pain. Objective:     /72 (Site: Left Upper Arm, Position: Sitting, Cuff Size: Large Adult)   Pulse 115   Temp 99.1 °F (37.3 °C)   Resp 30   Wt (!) 350 lb (158.8 kg)   SpO2 90%   BMI 48.82 kg/m²     Physical Exam  Vitals signs reviewed. Constitutional:       General: He is in acute distress. Appearance: He is well-developed. He is obese. He is ill-appearing. He is not toxic-appearing or diaphoretic. HENT:      Head: Normocephalic. Right Ear: Tympanic membrane, ear canal and external ear normal.      Left Ear: Tympanic membrane, ear canal and external ear normal.      Nose: Rhinorrhea present. No mucosal edema or congestion. Mouth/Throat:      Mouth: Mucous membranes are moist. Mucous membranes are not pale and not dry. Pharynx: Oropharynx is clear. Eyes:      General: Lids are normal. No scleral icterus. Right eye: No discharge. Left eye: No discharge. Extraocular Movements:      Right eye: No nystagmus. Left eye: No nystagmus. Conjunctiva/sclera: Conjunctivae normal.   Neck:      Musculoskeletal: Full passive range of motion without pain and normal range of motion. Trachea: Trachea normal.   Cardiovascular:      Rate and Rhythm: Regular rhythm. Tachycardia present. Heart sounds: Normal heart sounds. Pulmonary:      Effort: Tachypnea present. No accessory muscle usage or respiratory distress. Breath sounds: No stridor. Examination of the right-lower field reveals decreased breath sounds. Examination of the left-lower field reveals decreased breath sounds. Decreased breath sounds present. No wheezing, rhonchi or rales. Musculoskeletal: Normal range of motion. Skin:     General: Skin is warm and dry. Capillary Refill: Capillary refill takes less than 2 seconds. Coloration: Skin is not pale. Neurological:      Mental Status: He is alert and oriented to person, place, and time.    Psychiatric: Mood and Affect: Mood normal.         Speech: Speech normal.         Behavior: Behavior normal.         Thought Content: Thought content normal.         Judgment: Judgment normal.         Assessment:      Diagnosis Orders   1. SOB (shortness of breath)  COVID-19 Ambulatory   2. Chronic systolic congestive heart failure ([address]       No results found for this visit on 01/13/21. Plan:       Explained the need to go to hospital for further evaluation and treatment. Likely COVID positive. Go to emergency room for further treatment. Report called to [facility] EVELYNE, RN. Patient high risk due to obesity and CHF. Patient Instructions     Likely COVID positive. Go to emergency room for further treatment. Patient Education        Shortness of Breath: Care Instructions  Your Care Instructions     Shortness of breath has many causes. Sometimes conditions such as anxiety can lead to shortness of breath. Some people get mild shortness of breath when they exercise. Trouble breathing also can be a symptom of a serious problem, such as asthma, lung disease, emphysema, heart problems, and pneumonia. If your shortness of breath continues, you may need tests and treatment. Watch for any changes in your breathing and other symptoms. Follow-up care is a key part of your treatment and safety. Be sure to make and go to all appointments, and call your doctor if you are having problems. It's also a good idea to know your test results and keep a list of the medicines you take. How can you care for yourself at home? · Do not smoke or allow others to smoke around you. If you need help quitting, talk to your doctor about stop-smoking programs and medicines. These can increase your chances of quitting for good. · Get plenty of rest and sleep. · Take your medicines exactly as prescribed. Call your doctor if you think you are having a problem with your medicine. · Find healthy ways to deal with stress. ? Exercise daily. ? Get plenty of sleep. ? Eat regularly and well. When should you call for help? Call 911 anytime you think you may need emergency care. For example, call if:    · You have severe shortness of breath.     · You have symptoms of a heart attack. These may include:  ? Chest pain or pressure, or a strange feeling in the chest.  ? Sweating. ? Shortness of breath. ? Nausea or vomiting. ? Pain, pressure, or a strange feeling in the back, neck, jaw, or upper belly or in one or both shoulders or arms. ? Lightheadedness or sudden weakness. ? A fast or irregular heartbeat. After you call 911, the  may tell you to chew 1 adult-strength or 2 to 4 low-dose aspirin. Wait for an ambulance. Do not try to drive yourself. Call your doctor now or seek immediate medical care if:    · Your shortness of breath gets worse or you start to wheeze. Wheezing is a high-pitched sound when you breathe.     · You wake up at night out of breath or have to prop your head up on several pillows to breathe.     · You are short of breath after only light activity or while at rest.   Watch closely for changes in your health, and be sure to contact your doctor if:    · You do not get better over the next 1 to 2 days. Where can you learn more? Go to https://AltaRock EnergypeLP33.TV.[employer]. org and sign in to your Zlio account. Enter U780 in the [facility] box to learn more about \"Shortness of Breath: Care Instructions. \"     If you do not have an account, please click on the \"Sign Up Now\" link. Current as of: February 24, 2020               Content Version: 12.6  © 2585-8211 [employer]. Care instructions adapted under license by [facility] ([facility]). If you have questions about a medical condition or this instruction, always ask your healthcare professional. [address] [address] any warranty or liability for your use of this information. Patient/Caregiver instructed on use, benefit, and side effects of prescribed medications. All patient/parent/caregiver questions answered. Patient/parent/caregiver voiced understanding. Reviewed health maintenance. Instructed to continue current medications, diet and exercise. Patient agreed with treatment plan. Follow up as directed.            Electronically signed by KLAUDIA Landaverde NP on1/13/2021

## 2021-01-13 NOTE — PATIENT INSTRUCTIONS
After you call 911, the  may tell you to chew 1 adult-strength or 2 to 4 low-dose aspirin. Wait for an ambulance. Do not try to drive yourself. Call your doctor now or seek immediate medical care if:    · Your shortness of breath gets worse or you start to wheeze. Wheezing is a high-pitched sound when you breathe.     · You wake up at night out of breath or have to prop your head up on several pillows to breathe.     · You are short of breath after only light activity or while at rest.   Watch closely for changes in your health, and be sure to contact your doctor if:    · You do not get better over the next 1 to 2 days. Where can you learn more? Go to https://Skim.it.Symphony [employer]. org and sign in to your Zumeo.com account. Enter S780 in the [employer] box to learn more about \"Shortness of Breath: Care Instructions. \"     If you do not have an account, please click on the \"Sign Up Now\" link. Current as of: February 24, 2020               Content Version: 12.6  © 1925-5635 [employer]. Care instructions adapted under license by [facility] ([facility]). If you have questions about a medical condition or this instruction, always ask your healthcare professional. [address] any warranty or liability for your use of this information.

## 2021-01-13 NOTE — LETTER
[facility]  [address]  Phone: [phone]  Fax: [phone]    KLAUDIA Perkins NP        01/13/2021    Patient: Panda Elise   YOB: 1975   Date of Visit: 01/13/2021       To Whom it May Concern:    Ashli Ojeda was seen in my clinic on 01/13/2021 He may return to work/school after a negative covid test result (results expected in appx 5-7 days) and marked symptom improvement. Patient should be fever free for 24 hours without medication. If you have any questions or concerns, please don't hesitate to call.     Sincerely,         KLAUDIA Perkins NP

## 2021-01-17 LAB — SARS-COV-2, NAA: NOT DETECTED

## 2021-01-18 ENCOUNTER — TELEPHONE (OUTPATIENT)
Dept: FAMILY MEDICINE CLINIC | Age: 46
End: 2021-01-18

## 2021-01-19 ENCOUNTER — VIRTUAL VISIT (OUTPATIENT)
Dept: FAMILY MEDICINE CLINIC | Age: 46
End: 2021-01-19
Payer: COMMERCIAL

## 2021-01-19 DIAGNOSIS — E11.9 TYPE 2 DIABETES MELLITUS WITHOUT COMPLICATION, WITHOUT LONG-TERM CURRENT USE OF INSULIN (HCC): ICD-10-CM

## 2021-01-19 DIAGNOSIS — R09.82 POST-NASAL DRIP: ICD-10-CM

## 2021-01-19 DIAGNOSIS — J12.9 VIRAL PNEUMONIA: Primary | ICD-10-CM

## 2021-01-19 DIAGNOSIS — R05.9 COUGH: ICD-10-CM

## 2021-01-19 PROCEDURE — 99214 OFFICE O/P EST MOD 30 MIN: CPT | Performed by: NURSE PRACTITIONER

## 2021-01-19 RX ORDER — METFORMIN HYDROCHLORIDE 500 MG/1
1000 TABLET, EXTENDED RELEASE ORAL 2 TIMES DAILY
Qty: 120 TABLET | Refills: 0 | Status: SHIPPED | OUTPATIENT
Start: 2021-01-19 | End: 2021-02-15

## 2021-01-19 RX ORDER — ATORVASTATIN CALCIUM 20 MG/1
20 TABLET, FILM COATED ORAL DAILY
Qty: 90 TABLET | Refills: 0 | Status: SHIPPED | OUTPATIENT
Start: 2021-01-19 | End: 2021-04-22

## 2021-01-19 RX ORDER — CETIRIZINE HYDROCHLORIDE 10 MG/1
10 TABLET ORAL DAILY
Qty: 30 TABLET | Refills: 5 | COMMUNITY
Start: 2021-01-19

## 2021-01-19 RX ORDER — MONTELUKAST SODIUM 10 MG/1
10 TABLET ORAL NIGHTLY
Qty: 30 TABLET | Refills: 0 | Status: SHIPPED | OUTPATIENT
Start: 2021-01-19 | End: 2021-02-15

## 2021-01-19 ASSESSMENT — ENCOUNTER SYMPTOMS
VOMITING: 0
RHINORRHEA: 0
WHEEZING: 0
ABDOMINAL PAIN: 0
NAUSEA: 0
SORE THROAT: 0
DIARRHEA: 0
SINUS PRESSURE: 0

## 2021-01-19 NOTE — PROGRESS NOTES
[facility]  [address] [facility]  Dept: [phone]  Dept Fax: [phone]    TELEHEALTH EVALUATION -- Audio/Visual (During EUNXN-15 public health emergency)    Giuliana Duff (:  1975) has requested an audio/video evaluation for the following concern(s):    Chief Complaint   Patient presents with   [address] from Hospital     was seen at walk in clinic and O2 had dropped and was sent to ER to be admitted had tests ran and dx with blood clot or viral pneumonia, CT scan showed viral pneumonia and sent home states his SOB is getting better after zpak but laying down or sitting will start to cough, will develop a fever every 24 hrs       HPI:   Patient presents to the office for follow-up of cough and shortness of breath. He was initially evaluated at [facility] ED. CT chest completed and diagnosed with viral pneumonia. He was placed on Zithromax and discharged home. COVID 19 testing completed and negative. Today he reports feeling almost back to normal. He continues to cough which makes it difficult to sleep at night. He is also noticing a fever (102.1, 101.7) in the evening between 4-7 pm, but also states he is unsure if his thermometer is working. It is 13years old. He has been taking Advil cold and sinus which seems to help. He denies chills, fatigue, shortness of breath, or wheezing. Shortness of Breath  This is a new problem. The current episode started 1 to 4 weeks ago. The problem has been gradually improving. Associated symptoms include a fever. Pertinent negatives include no abdominal pain, chest pain, ear pain, headaches, leg swelling, orthopnea, rhinorrhea, sore throat, sputum production, syncope, vomiting or wheezing. Nothing aggravates the symptoms. Risk factors include smoking. Treatments tried: ATB. The treatment provided moderate relief.        BP Readings from Last 3 Encounters:   21 128/72 Cardiovascular: Negative for chest pain, palpitations, orthopnea, leg swelling and syncope. Gastrointestinal: Negative for abdominal pain, constipation, diarrhea, nausea and vomiting. Endocrine: Negative for cold intolerance, heat intolerance, polydipsia, polyphagia and polyuria. Genitourinary: Negative. Musculoskeletal: Positive for arthralgias and myalgias. Skin: Negative. Allergic/Immunologic: Negative for environmental allergies and food allergies. Neurological: Negative for dizziness, weakness, light-headedness and headaches. Psychiatric/Behavioral: Positive for sleep disturbance (cough). Negative for agitation and dysphoric mood. The patient is not nervous/anxious. Objective: There were no vitals taken for this visit. PHYSICAL EXAMINATION:  [ INSTRUCTIONS:  \"[x]\" Indicates a positive item  \"[]\" Indicates a negative item  -- DELETE ALL ITEMS NOT EXAMINED]    Constitutional: [x] Appears well-developed and well-nourished [x] No apparent distress      [] Abnormal-   Mental status  [x] Alert and awake  [x] Oriented to person/place/time [x]Able to follow commands      Eyes:  EOM    []  Normal  [] Abnormal-  Sclera  [x]  Normal  [] Abnormal -         Discharge [x]  None visible  [] Abnormal -    HENT:   [x] Normocephalic, atraumatic.   [] Abnormal   [x] Mouth/Throat: Mucous membranes are moist.     External Ears [x] Normal  [] Abnormal-     Neck: [x] No visualized mass     Pulmonary/Chest: [x] Respiratory effort normal.  [x] No visualized signs of difficulty breathing or respiratory distress        [] Abnormal-      Musculoskeletal:   [] Normal gait with no signs of ataxia         [] Normal range of motion of neck        [] Abnormal-       Neurological:        [x] No Facial Asymmetry (Cranial nerve 7 motor function) (limited exam to video visit)          [x] No gaze palsy        [] Abnormal- Skin:        [x] No significant exanthematous lesions or discoloration noted on facial skin         [] Abnormal-            Psychiatric:       [x] Normal Affect [x] No Hallucinations        [] Abnormal-     Other pertinent observable physical exam findings- none. PHQ Scores 4/24/2019 10/5/2017   PHQ2 Score 0 0   PHQ9 Score 0 0     Interpretation of Total Score Depression Severity: 1-4 = Minimal depression, 5-9 = Mild depression, 10-14 = Moderate depression, 15-19 = Moderately severe depression, 20-27 = Severe depression     Lab Results   Component Value Date     01/13/2021    K 4.2 01/13/2021    CL 99 01/13/2021    CO2 30 01/13/2021    BUN 9 01/13/2021    CREATININE 0.5 (L) 01/13/2021    GLUCOSE 205 (H) 01/13/2021    CALCIUM 9.4 01/13/2021    PROT 7.2 09/22/2017    LABALBU 4.1 01/13/2021    BILITOT 0.7 01/13/2021    ALKPHOS 92 01/13/2021    AST 39 01/13/2021    ALT 63 (H) 01/13/2021    LABGLOM > 60.0 01/13/2021    GFRAA >60 09/22/2017    GLOB 3.6 01/13/2021     Lab Results   Component Value Date    LABA1C 7.1 04/24/2019    LABA1C 6.1 01/10/2018    LABA1C 6.4 10/05/2017       Lab Results   Component Value Date    LABMICR 1.3 01/10/2018    LDLCALC 125.0 01/10/2018    CREATININE 0.5 (L) 01/13/2021       Assessment:     1. Viral pneumonia    2. Cough    3. Post-nasal drip    4.  Type 2 diabetes mellitus without complication, without long-term current use of insulin (HCC)        Plan:       Orders Placed This Encounter   Medications    metFORMIN (GLUCOPHAGE-XR) 500 MG extended release tablet     Sig: Take 2 tablets by mouth 2 times daily     Dispense:  120 tablet     Refill:  0    atorvastatin (LIPITOR) 20 MG tablet     Sig: Take 1 tablet by mouth daily     Dispense:  90 tablet     Refill:  0    montelukast (SINGULAIR) 10 MG tablet     Sig: Take 1 tablet by mouth nightly     Dispense:  30 tablet     Refill:  0    cetirizine (ZYRTEC ALLERGY) 10 MG tablet     Sig: Take 1 tablet by mouth daily Dispense:  30 tablet     Refill:  5      Continue to rest, drink plenty of fluids. Monitor for worsening symptoms, call the office with any questions, or concerns. Mireya Kong is a 40 y.o. male being evaluated by a Virtual Visit (video visit) encounter to address concerns as mentioned above. A caregiver was present when appropriate. Due to this being a TeleHealth encounter (During [facility]-94 public health emergency), evaluation of the following organ systems was limited: Vitals/Constitutional/EENT/Resp/CV/GI//MS/Neuro/Skin/Heme-Lymph-Imm. Pursuant to the emergency declaration under the [address] [place] [address], [address] [place] [address] authority and the [employer] and [employer] Act, this Virtual Visit was conducted with patient's (and/or legal guardian's) consent, to reduce the patient's risk of exposure to COVID-19 and provide necessary medical care. The patient (and/or legal guardian) has also been advised to contact this office for worsening conditions or problems, and seek emergency medical treatment and/or call 911 if deemed necessary. Services were provided through a video synchronous discussion virtually to substitute for in-person clinic visit. Patient and provider were located at their individual homes. --Trinda Lennox, APRN - CNP on 1/29/2021 at 12:14 PM    An electronic signature was used to authenticate this note.

## 2021-01-29 ASSESSMENT — ENCOUNTER SYMPTOMS
ORTHOPNEA: 0
SHORTNESS OF BREATH: 1
EYES NEGATIVE: 1
SPUTUM PRODUCTION: 0
CONSTIPATION: 0
COUGH: 0

## 2021-02-12 ENCOUNTER — VIRTUAL VISIT (OUTPATIENT)
Dept: FAMILY MEDICINE CLINIC | Age: 46
End: 2021-02-12
Payer: COMMERCIAL

## 2021-02-12 DIAGNOSIS — I50.22 CHRONIC SYSTOLIC CONGESTIVE HEART FAILURE (HCC): ICD-10-CM

## 2021-02-12 DIAGNOSIS — G47.33 OSA ON CPAP: ICD-10-CM

## 2021-02-12 DIAGNOSIS — R06.02 EXERTIONAL SHORTNESS OF BREATH: Primary | ICD-10-CM

## 2021-02-12 DIAGNOSIS — R05.9 COUGH: ICD-10-CM

## 2021-02-12 DIAGNOSIS — Z99.89 OSA ON CPAP: ICD-10-CM

## 2021-02-12 DIAGNOSIS — Z87.891 FORMER SMOKER: ICD-10-CM

## 2021-02-12 DIAGNOSIS — R06.01 ORTHOPNEA: ICD-10-CM

## 2021-02-12 PROCEDURE — 99214 OFFICE O/P EST MOD 30 MIN: CPT | Performed by: INTERNAL MEDICINE

## 2021-02-12 RX ORDER — FUROSEMIDE 40 MG/1
40 TABLET ORAL DAILY
Qty: 30 TABLET | Refills: 0 | Status: SHIPPED | OUTPATIENT
Start: 2021-02-12 | End: 2021-03-12 | Stop reason: SDUPTHER

## 2021-02-12 NOTE — PROGRESS NOTES
Jessica Pulido (:  1975) is a 40 y.o. male,Established patient, here for evaluation of the following chief complaint(s): Shortness of Breath (patient had pneumonia in january and has had sob since. he has trouble walking up stairs and exertion. )      ASSESSMENT/PLAN:  1. Exertional shortness of breath  -     furosemide (LASIX) 40 MG tablet; Take 1 tablet by mouth daily, Disp-30 tablet, R-0Normal  -     Comprehensive Metabolic Panel; Future  2. Cough  -     furosemide (LASIX) 40 MG tablet; Take 1 tablet by mouth daily, Disp-30 tablet, R-0Normal  -     Comprehensive Metabolic Panel; Future  3. Former smoker  -     furosemide (LASIX) 40 MG tablet; Take 1 tablet by mouth daily, Disp-30 tablet, R-0Normal  -     Comprehensive Metabolic Panel; Future  4. KADE on CPAP  -     furosemide (LASIX) 40 MG tablet; Take 1 tablet by mouth daily, Disp-30 tablet, R-0Normal  -     Comprehensive Metabolic Panel; Future  5. Orthopnea  -     furosemide (LASIX) 40 MG tablet; Take 1 tablet by mouth daily, Disp-30 tablet, R-0Normal  -     Comprehensive Metabolic Panel; Future  6. Chronic systolic congestive heart failure (HCC)  -     furosemide (LASIX) 40 MG tablet; Take 1 tablet by mouth daily, Disp-30 tablet, R-0Normal  -     Comprehensive Metabolic Panel; Future      No follow-ups on file. SUBJECTIVE/OBJECTIVE:  HPI 81-year-old male is following up on his recent hospitalization/ER visit for viral pneumonia. Patient reports since he got out of the hospital he continues to have shortness of breath and feels like most of his symptoms when he had pneumonia have gone away but he still continues to experience exertional dyspnea especially when he is taking stairs/walking especially up and down the stairs. He has also notices been coming up with a clear phlegm colored coughing spells specially when he lies in a supine position. Last night he was having some trouble breathing sitting supine position that he was getting up almost every half an hour to breathe. .  For the last 4 days he has been compliant with his CPAP machine does have underlying sleep apnea. He was given Singulair Zyrtec by his primary care doctor for lingering cough since post viral pneumonia hospitalization that does not seem to help. He said his Covid test was negative last month when he had the pnuemonia but he had all the signs and symptoms of Covid so unsure about that. He smoked 1 pack a day for 20 years and denies any histories of COPD and currently is on nicotine patches. He does have history of systolic congestive heart failure there were some bilateral pleural effusions seen on the CAT scans. No chest pain no chest tightness no wheezing no presyncopal or syncopal episodes. He does report having mild leg edema with orthopnea as well as PND related symptoms. BNP was 101. Review of Systems   Constitutional: Negative for fatigue, fever and unexpected weight change. HENT: Negative for ear pain, postnasal drip, rhinorrhea, sinus pain, sore throat and trouble swallowing. Eyes: Negative for visual disturbance. Respiratory: Positive for cough and shortness of breath. Negative for apnea, choking, chest tightness, wheezing and stridor. Cardiovascular: Positive for leg swelling. Negative for chest pain. Gastrointestinal: Negative for abdominal pain, blood in stool and diarrhea. Endocrine: Negative for polyuria. Genitourinary: Negative for difficulty urinating and flank pain. Musculoskeletal: Negative for arthralgias, joint swelling and myalgias. Skin: Negative for rash. Allergic/Immunologic: Negative for environmental allergies. Neurological: Negative for weakness, light-headedness, numbness and headaches. Hematological: Negative for adenopathy. Psychiatric/Behavioral: Negative for behavioral problems and suicidal ideas. The patient is not nervous/anxious. Patient-Reported Vitals 1/19/2021   Patient-Reported Weight 350#        Physical Exam    [INSTRUCTIONS:  \"[x]\" Indicates a positive item  \"[]\" Indicates a negative item  -- DELETE ALL ITEMS NOT EXAMINED]    Constitutional: [x] Appears well-developed and well-nourished [x] No apparent distress      [] Abnormal -     Mental status: [x] Alert and awake  [x] Oriented to person/place/time [x] Able to follow commands    [] Abnormal -     Eyes:   EOM    [x]  Normal    [] Abnormal -   Sclera  [x]  Normal    [] Abnormal -          Discharge [x]  None visible   [] Abnormal -     HENT: [x] Normocephalic, atraumatic  [] Abnormal -   [x] Mouth/Throat: Mucous membranes are moist    External Ears [x] Normal  [] Abnormal -    Neck: [x] No visualized mass [] Abnormal -     Pulmonary/Chest: [x] Respiratory effort normal   [x] No visualized signs of difficulty breathing or respiratory distress        [] Abnormal -      Musculoskeletal:   [x] Normal gait with no signs of ataxia         [x] Normal range of motion of neck        [] Abnormal -     Neurological:        [x] No Facial Asymmetry (Cranial nerve 7 motor function) (limited exam due to video visit)          [x] No gaze palsy        [] Abnormal -          Skin:        [x] No significant exanthematous lesions or discoloration noted on facial skin         [] Abnormal -            Psychiatric:       [x] Normal Affect [] Abnormal -        [x] No Hallucinations    Other pertinent observable physical exam findings:-          On this date 02/18/21 I have spent 30 minutes reviewing previous notes, test results and face to face (virtual) with the patient discussing the diagnosis and importance of compliance with the treatment plan as well as documenting on the day of the visit.     A:P Suspecting possible acute on chronic systolic heart failure given his symptoms have been to pain orthopnea leg edema PND although his BNP was slightly elevated but he is obese CAT scan of the lungs done last month showed bilateral pleural effusions. Will start him on Lasix with potassium to see if that would help with some of his symptoms of exertional dyspnea with cough as well as order CHEM panel to check his electrolytes in about a week. He should follow up with us in 1 to 2 weeks. Naya Plummer is a 40 y.o. male being evaluated by a Virtual Visit (video visit) encounter to address concerns as mentioned above. A caregiver was present when appropriate. Due to this being a TeleHealth encounter (During [facility] public health emergency), evaluation of the following organ systems was limited: Vitals/Constitutional/EENT/Resp/CV/GI//MS/Neuro/Skin/Heme-Lymph-Imm. Pursuant to the emergency declaration under the [address] [place] [address] authority and the [employer] and [employer] Act, this Virtual Visit was conducted with patient's (and/or legal guardian's) consent, to reduce the patient's risk of exposure to COVID-19 and provide necessary medical care. The patient (and/or legal guardian) has also been advised to contact this office for worsening conditions or problems, and seek emergency medical treatment and/or call 911 if deemed necessary. Patient identification was verified at the start of the visit: Yes    Services were provided through a video synchronous discussion virtually to substitute for in-person clinic visit. Patient was located at home and provider was located in office or at home. An electronic signature was used to authenticate this note.     --Asiya Frederick MD

## 2021-02-18 ENCOUNTER — TELEPHONE (OUTPATIENT)
Dept: FAMILY MEDICINE CLINIC | Age: 46
End: 2021-02-18

## 2021-02-18 RX ORDER — POTASSIUM CHLORIDE 20 MEQ/1
20 TABLET, EXTENDED RELEASE ORAL DAILY
Qty: 30 TABLET | Refills: 1 | Status: SHIPPED | OUTPATIENT
Start: 2021-02-18 | End: 2021-03-16 | Stop reason: SDUPTHER

## 2021-02-18 ASSESSMENT — ENCOUNTER SYMPTOMS
SINUS PAIN: 0
STRIDOR: 0
WHEEZING: 0
CHEST TIGHTNESS: 0
SORE THROAT: 0
DIARRHEA: 0
TROUBLE SWALLOWING: 0
ABDOMINAL PAIN: 0
APNEA: 0
BLOOD IN STOOL: 0
COUGH: 1
CHOKING: 0
SHORTNESS OF BREATH: 1
RHINORRHEA: 0

## 2021-02-18 NOTE — TELEPHONE ENCOUNTER
Per Dr. Thomas Part spoke with pt letting him know she sent in a script of potassium to take with his lasix daily. Also pt stated that he can try and get his labs drawn tomorrow or if not then he will try for Monday.

## 2021-02-19 ENCOUNTER — HOSPITAL ENCOUNTER (OUTPATIENT)
Age: 46
Setting detail: SPECIMEN
Discharge: HOME | End: 2021-02-19
Payer: COMMERCIAL

## 2021-02-19 DIAGNOSIS — R06.01 ORTHOPNEA: ICD-10-CM

## 2021-02-19 DIAGNOSIS — R06.02 EXERTIONAL SHORTNESS OF BREATH: ICD-10-CM

## 2021-02-19 DIAGNOSIS — I50.22 CHRONIC SYSTOLIC CONGESTIVE HEART FAILURE (HCC): ICD-10-CM

## 2021-02-19 DIAGNOSIS — Z87.891 FORMER SMOKER: ICD-10-CM

## 2021-02-19 DIAGNOSIS — R05.9 COUGH: ICD-10-CM

## 2021-02-19 DIAGNOSIS — G47.33 OSA ON CPAP: ICD-10-CM

## 2021-02-19 DIAGNOSIS — Z99.89 OSA ON CPAP: ICD-10-CM

## 2021-02-19 LAB
ALBUMIN SERPL-MCNC: 3.8 G/DL (ref 3.5–5.2)
ALBUMIN/GLOBULIN RATIO: 1.2 (ref 1–2.5)
ALP BLD-CCNC: 76 U/L (ref 40–129)
ALT SERPL-CCNC: 31 U/L (ref 5–41)
ANION GAP SERPL CALCULATED.3IONS-SCNC: 9 MMOL/L (ref 9–17)
AST SERPL-CCNC: 28 U/L
BILIRUB SERPL-MCNC: 0.28 MG/DL (ref 0.3–1.2)
BUN BLDV-MCNC: 14 MG/DL (ref 6–20)
BUN/CREAT BLD: 22 (ref 9–20)
CALCIUM SERPL-MCNC: 9.5 MG/DL (ref 8.6–10.4)
CHLORIDE BLD-SCNC: 97 MMOL/L (ref 98–107)
CO2: 27 MMOL/L (ref 20–31)
CREAT SERPL-MCNC: 0.63 MG/DL (ref 0.7–1.2)
GFR AFRICAN AMERICAN: >60 ML/MIN
GFR NON-AFRICAN AMERICAN: >60 ML/MIN
GFR SERPL CREATININE-BSD FRML MDRD: ABNORMAL ML/MIN/{1.73_M2}
GFR SERPL CREATININE-BSD FRML MDRD: ABNORMAL ML/MIN/{1.73_M2}
GLUCOSE BLD-MCNC: 201 MG/DL (ref 70–99)
POTASSIUM SERPL-SCNC: 4.2 MMOL/L (ref 3.7–5.3)
SODIUM BLD-SCNC: 133 MMOL/L (ref 135–144)
TOTAL PROTEIN: 7.1 G/DL (ref 6.4–8.3)

## 2021-02-19 PROCEDURE — 80053 COMPREHEN METABOLIC PANEL: CPT

## 2021-02-19 PROCEDURE — 36415 COLL VENOUS BLD VENIPUNCTURE: CPT

## 2021-03-01 ENCOUNTER — OFFICE VISIT (OUTPATIENT)
Dept: FAMILY MEDICINE CLINIC | Age: 46
End: 2021-03-01
Payer: COMMERCIAL

## 2021-03-01 VITALS
HEART RATE: 104 BPM | WEIGHT: 315 LBS | DIASTOLIC BLOOD PRESSURE: 82 MMHG | OXYGEN SATURATION: 93 % | SYSTOLIC BLOOD PRESSURE: 128 MMHG | BODY MASS INDEX: 46.44 KG/M2

## 2021-03-01 DIAGNOSIS — J34.89 SINUS DRAINAGE: ICD-10-CM

## 2021-03-01 DIAGNOSIS — I50.22 CHRONIC SYSTOLIC CONGESTIVE HEART FAILURE (HCC): ICD-10-CM

## 2021-03-01 DIAGNOSIS — E11.9 TYPE 2 DIABETES MELLITUS WITHOUT COMPLICATION, WITHOUT LONG-TERM CURRENT USE OF INSULIN (HCC): Primary | ICD-10-CM

## 2021-03-01 DIAGNOSIS — R05.9 COUGH: ICD-10-CM

## 2021-03-01 DIAGNOSIS — R06.01 ORTHOPNEA: ICD-10-CM

## 2021-03-01 DIAGNOSIS — F17.200 SMOKER: ICD-10-CM

## 2021-03-01 DIAGNOSIS — R60.0 BILATERAL LEG EDEMA: ICD-10-CM

## 2021-03-01 DIAGNOSIS — R04.2 HEMOPTYSIS: ICD-10-CM

## 2021-03-01 LAB — HBA1C MFR BLD: 8.6 %

## 2021-03-01 PROCEDURE — 83036 HEMOGLOBIN GLYCOSYLATED A1C: CPT | Performed by: INTERNAL MEDICINE

## 2021-03-01 PROCEDURE — 3052F HG A1C>EQUAL 8.0%<EQUAL 9.0%: CPT | Performed by: INTERNAL MEDICINE

## 2021-03-01 PROCEDURE — 99214 OFFICE O/P EST MOD 30 MIN: CPT | Performed by: INTERNAL MEDICINE

## 2021-03-01 PROCEDURE — 93000 ELECTROCARDIOGRAM COMPLETE: CPT | Performed by: INTERNAL MEDICINE

## 2021-03-01 RX ORDER — AZELASTINE 1 MG/ML
1 SPRAY, METERED NASAL 2 TIMES DAILY
Qty: 2 BOTTLE | Refills: 1 | Status: SHIPPED | OUTPATIENT
Start: 2021-03-01 | End: 2021-11-05 | Stop reason: ALTCHOICE

## 2021-03-01 RX ORDER — DULAGLUTIDE 0.75 MG/.5ML
0.75 INJECTION, SOLUTION SUBCUTANEOUS WEEKLY
Qty: 5 PEN | Refills: 2 | Status: SHIPPED | OUTPATIENT
Start: 2021-03-01 | End: 2021-07-13 | Stop reason: SDUPTHER

## 2021-03-01 NOTE — PROGRESS NOTES
[address]  [address]  Dept: [phone]  Dept Fax: [phone]: [phone]     Visit Date:  3/1/2021    Patient:  Doreen Marquez  YOB: 1975    HPI:   Doreen Marquez presents today for   Chief Complaint   Patient presents with   [address]     patient is here today to discuss lab work. Nico Higgins HPI 40year old male with a history of venous insufficiency, varicose vein, hypertension, morbid obesity BMI of 46, sleep apnea on CPAP, diabetes mellitus type 2 A1C 8.6, tobacco abuse 1ppd 25 years on/off, depression anxiety insomnia, systolic CHF nonischemic cardiomyopathy last echo showed ejection fraction of 30 to 35% is coming in for a follow-up on his cough/labs/Diabetes. Acute on chronic systolic CHF/decompensated systolic HF-patient was seen virtually by me couple of weeks ago except endorsing extreme orthopnea fatigue coughing spells with clear-colored sputum weight gain/heart failure-like symptoms. Although he had a recent admission in January for viral pneumonia his BNP was slightly elevated but close to normal on the higher side and seems to go downhill since then. Over virtual visit couple of weeks ago my clinical suspicion was possibly CHF exacerbation. He has been noncompliant with low-salt /reduced fluid related diet past few months. We started him on Lasix 40 mg with potassium and he reports that he feels a lot better in fact his coughing spells have come down, his orthopnea is much better but not resolved yet. He still has some difficulty laying flat and most the time sleeps in a recliner. He works 7 days a week as . His weight today compared to the last visit shows 330 pounds and is lost more than 20 pounds in couple of months.   He was told in the past that hydrochlorothiazide would help in terms of working as a diuretic for him by feel 12.5 MG capsule Take 1 capsule by mouth daily Yes Biju Jovel, DO   CPAP Machine MISC by Does not apply route Yes Patrice Jovel, DO   blood glucose monitor kit and supplies Test 2 times a day & as needed for symptoms of irregular blood glucose. Yes KLAUDIA Bills CNP   blood glucose monitor strips Use 2 times daily to test sugars Yes KLAUDIA Bills CNP   Lancets MISC Use 2 times daily Yes KLAUDIA Bills CNP   aspirin 81 MG tablet Take 81 mg by mouth daily Patient now has 81 mg aspirin Yes Historical Provider, MD   Acetaminophen (TYLENOL PO) Take  by mouth as needed. Yes Historical Provider, MD        Allergies:  has No Known Allergies. Past Medical History:   has a past medical history of Abnormal stress test, CHF (congestive heart failure) ([facility] [address] Depression with anxiety, Glucose intolerance (impaired glucose tolerance), Insomnia, Morbid obesity ([facility] [address] Sinusitis, chronic, Tobacco abuse, Unspecified sleep apnea, Varicose vein, and Venous insufficiency. Past Surgical History   has a past surgical history that includes Cardiac catheterization (11/22/2017). Family History  family history includes Cervical Cancer in his mother; Diabetes in his maternal grandfather; Other in his daughter, father, and mother. Social History   reports that he has been smoking cigarettes. He has a 19.00 pack-year smoking history. He has never used smokeless tobacco. He reports current alcohol use. He reports that he does not use drugs.     Health Maintenance:    Health Maintenance   Topic Date Due    Hepatitis C screen  Never done    Pneumococcal 0-64 years Vaccine (1 of 1 - PPSV23) Never done    Diabetic foot exam  Never done    HIV screen  Never done    Hepatitis B vaccine (1 of 3 - Risk 3-dose series) Never done    DTaP/Tdap/Td vaccine (1 - Tdap) Never done    Diabetic microalbuminuria test  01/10/2019    Lipid screen  01/10/2019    Diabetic retinal exam  08/26/2020    Flu vaccine (1) Never done  Potassium monitoring  02/19/2022    Creatinine monitoring  02/19/2022    A1C test (Diabetic or Prediabetic)  03/01/2022    Hepatitis A vaccine  Aged Out    Hib vaccine  Aged Out    Meningococcal (ACWY) vaccine  Aged Out       Subjective:      Review of Systems   Constitutional: Negative for fatigue, fever and unexpected weight change. HENT: Negative for ear pain, postnasal drip, rhinorrhea, sinus pain, sore throat and trouble swallowing. Eyes: Negative for visual disturbance. Respiratory: Positive for cough and shortness of breath. Negative for chest tightness. Cardiovascular: Positive for leg swelling. Negative for chest pain. Gastrointestinal: Negative for abdominal pain, blood in stool and diarrhea. Endocrine: Negative for polyuria. Genitourinary: Negative for difficulty urinating and flank pain. Musculoskeletal: Negative for arthralgias, joint swelling and myalgias. Skin: Negative for rash. Allergic/Immunologic: Negative for environmental allergies. Neurological: Negative for weakness, light-headedness, numbness and headaches. Hematological: Negative for adenopathy. Psychiatric/Behavioral: Negative for behavioral problems and suicidal ideas. The patient is not nervous/anxious. Objective:     /82 (Site: Right Upper Arm, Position: Sitting)   Pulse 104   Wt (!) 333 lb (151 kg)   SpO2 93%   BMI 46.44 kg/m²     Physical Exam  Vitals signs and nursing note reviewed. Constitutional:       Appearance: He is obese. HENT:      Head: Normocephalic and atraumatic. Neck:      Comments: Could not appreciate JVD. Cardiovascular:      Rate and Rhythm: Regular rhythm. Tachycardia present. Pulses: Normal pulses. Heart sounds: Normal heart sounds. No murmur. No friction rub. No gallop. Pulmonary:      Effort: Pulmonary effort is normal.      Breath sounds: No stridor.       Comments: Decreased breath sounds at the bases  Abdominal:      Palpations: Abdomen is Doppler W Color         PLAN   -Continue with lasix/ potassium supplements recheck electrolytes in a month and follow-up in a month. I am going get an echocardiogram.     - As well as he reports having chronic sinus drainages seasonal allergy-like symptoms will have him try azelastine nasal spray for the postnasal drainage.      -He does report on and off he has had several episodes of excessive coughing followed by hemoptysis is not big amount of fresh blood per sputum but little specks of blood is not in any blood thinners , no history of  lung cancers although he does smoke and has been smoking for more than 25 years. No histories of tuberculosis. We shall continue to monitor if worsening episodes might do further evaluation. Heart failure/pulmonary congestion/bronchitis due to coughing could cause endothelial injury/ruptures of microvasculature.     -For Diabetes I suggested we start him on Trulicity for better glycemic goal target A1c to less than 7. He should be on a look out for hypoglycemia went over detail side effects with Trulicity and how to be administering the medication no history is of thyroid or parathyroid cancers. Hand out on how to use trulicity is provided. Orders Placed This Encounter   Procedures    Comprehensive Metabolic Panel     Standing Status:   Future     Standing Expiration Date:   3/1/2022    POCT Hb A1C (glycosylated hemoglobin)    EKG 12 Lead     Order Specific Question:   Reason for Exam?     Answer:   Chest pain    ECHO Complete 2D W Doppler W Color     Standing Status:   Future     Standing Expiration Date:   3/1/2022     Order Specific Question:   Reason for exam:     Answer:   orthopnea/leg edema/history of CHF        No follow-ups on file. Patient given educational materials - see patient instructions. Discussed use, benefit, and side effects of prescribed medications. All patient questions answered. Pt voiced understanding. Reviewed health maintenance. Electronically signed Isra Syed MD on 3/1/2021 at 3:58 PM EST

## 2021-03-03 ASSESSMENT — ENCOUNTER SYMPTOMS
RHINORRHEA: 0
BLOOD IN STOOL: 0
SHORTNESS OF BREATH: 1
DIARRHEA: 0
CHEST TIGHTNESS: 0
SINUS PAIN: 0
COUGH: 1
SORE THROAT: 0
ABDOMINAL PAIN: 0
TROUBLE SWALLOWING: 0

## 2021-03-12 DIAGNOSIS — Z99.89 OSA ON CPAP: ICD-10-CM

## 2021-03-12 DIAGNOSIS — R06.02 EXERTIONAL SHORTNESS OF BREATH: ICD-10-CM

## 2021-03-12 DIAGNOSIS — G47.33 OSA ON CPAP: ICD-10-CM

## 2021-03-12 DIAGNOSIS — R06.01 ORTHOPNEA: ICD-10-CM

## 2021-03-12 DIAGNOSIS — R05.9 COUGH: ICD-10-CM

## 2021-03-12 DIAGNOSIS — I50.22 CHRONIC SYSTOLIC CONGESTIVE HEART FAILURE (HCC): ICD-10-CM

## 2021-03-12 DIAGNOSIS — Z87.891 FORMER SMOKER: ICD-10-CM

## 2021-03-12 RX ORDER — FUROSEMIDE 40 MG/1
40 TABLET ORAL DAILY
Qty: 30 TABLET | Refills: 2 | Status: SHIPPED | OUTPATIENT
Start: 2021-03-12 | End: 2021-03-16 | Stop reason: SDUPTHER

## 2021-03-12 NOTE — TELEPHONE ENCOUNTER
Chong Rose is calling to request a refill on the following medication(s):  Requested Prescriptions     Pending Prescriptions Disp Refills    furosemide (LASIX) 40 MG tablet 30 tablet 0     Sig: Take 1 tablet by mouth daily       Last Visit Date (If Applicable):  Visit date not found    Next Visit Date:    Visit date not found

## 2021-03-16 ENCOUNTER — VIRTUAL VISIT (OUTPATIENT)
Dept: FAMILY MEDICINE CLINIC | Age: 46
End: 2021-03-16
Payer: COMMERCIAL

## 2021-03-16 DIAGNOSIS — Z99.89 OSA ON CPAP: ICD-10-CM

## 2021-03-16 DIAGNOSIS — G47.33 OSA ON CPAP: ICD-10-CM

## 2021-03-16 DIAGNOSIS — R06.02 EXERTIONAL SHORTNESS OF BREATH: ICD-10-CM

## 2021-03-16 DIAGNOSIS — Z87.891 FORMER SMOKER: ICD-10-CM

## 2021-03-16 DIAGNOSIS — I50.22 CHRONIC SYSTOLIC CONGESTIVE HEART FAILURE (HCC): ICD-10-CM

## 2021-03-16 DIAGNOSIS — R60.0 BILATERAL LEG EDEMA: ICD-10-CM

## 2021-03-16 DIAGNOSIS — R06.00 PND (PAROXYSMAL NOCTURNAL DYSPNEA): ICD-10-CM

## 2021-03-16 DIAGNOSIS — R05.9 COUGH: ICD-10-CM

## 2021-03-16 DIAGNOSIS — I50.23 ACUTE ON CHRONIC SYSTOLIC CONGESTIVE HEART FAILURE (HCC): Primary | ICD-10-CM

## 2021-03-16 DIAGNOSIS — R05.9 COUGHING: ICD-10-CM

## 2021-03-16 DIAGNOSIS — R06.01 ORTHOPNEA: ICD-10-CM

## 2021-03-16 PROCEDURE — 99443 PR PHYS/QHP TELEPHONE EVALUATION 21-30 MIN: CPT | Performed by: INTERNAL MEDICINE

## 2021-03-16 RX ORDER — FUROSEMIDE 40 MG/1
40 TABLET ORAL 2 TIMES DAILY
Qty: 90 TABLET | Refills: 2 | Status: SHIPPED | OUTPATIENT
Start: 2021-03-16 | End: 2021-07-05 | Stop reason: SDUPTHER

## 2021-03-16 RX ORDER — POTASSIUM CHLORIDE 20 MEQ/1
20 TABLET, EXTENDED RELEASE ORAL 2 TIMES DAILY
Qty: 60 TABLET | Refills: 1 | Status: SHIPPED | OUTPATIENT
Start: 2021-03-16 | End: 2021-07-13 | Stop reason: SDUPTHER

## 2021-03-16 NOTE — PROGRESS NOTES
[address]  [address]  Dept: [phone]  Dept Fax: [phone]: [phone]     Visit Date:  3/16/2021    Patient:  Juan C Felipe  YOB: 1975    HPI:   Juan C Felipe presents today for No chief complaint on file. .    Virtual Visit. HPI 40year old male with a history of venous insufficiency, varicose vein, hypertension, morbid obesity BMI of 46, sleep apnea on CPAP, diabetes mellitus type 2 A1C 8.6, tobacco abuse 1ppd 25 years on/off, depression anxiety insomnia, systolic CHF nonischemic cardiomyopathy last echo showed ejection fraction of 30 to 35% is calling regarding worsening orthopnea/PND/cough related symptoms.       Acute on chronic systolic CHF/decompensated systolic HF-for the past several days he has noticed that he is having really hard time trouble breathing at night especially when he lays on a supine position with extreme coughing fits coming up with clear-colored sputum. He does have signs and symptoms consistent with orthopnea PND/leg edema. Few weeks back We started him on Lasix 40 mg with potassium that seems to be helping a lot with his symptoms but in the past few weeks he has noticed that he has reached a plateau and was wondering today if he could go up on his Lasix diuretic therapy. With lasix/HCTZ diuretic therapy he has lost significant amount of weight. No chest pain chest tightness although he does have exertional shortness of breath with no presyncopal or syncopal episodes or any palpitations. Has been trying to be compliant with low salt/low fluid dietary/CHF dietary restrictions. I did ordered echo but he had to cancel the study as he was not feeling good. Seems to be worried that he could hardly lie flat on his back and would not be able to get the study done due to extreme coughing fits.               Medications  Prior to Visit Medications    Medication Sig Taking? Authorizing Provider   furosemide (LASIX) 40 MG tablet Take 1 tablet by mouth daily  Jim Hewitt MD   azelastine (ASTELIN) 0.1 % nasal spray 1 spray by Nasal route 2 times daily Use in each nostril as directed  Jim Hewitt MD   Dulaglutide (TRULICITY) 0.77 BX/5.0JB SOPN Inject 0.75 mg into the skin once a week  Jim Hewitt MD   potassium chloride (KLOR-CON M) 20 MEQ extended release tablet Take 1 tablet by mouth daily  Jim Hewitt MD   montelukast (SINGULAIR) 10 MG tablet take 1 tablet by mouth at bedtime  KLAUDIA Luna CNP   metFORMIN (GLUCOPHAGE-XR) 500 MG extended release tablet take 2 tablets by mouth twice a day  KLAUDIA Luna CNP   atorvastatin (LIPITOR) 20 MG tablet Take 1 tablet by mouth daily  KLAUDIA Luna CNP   cetirizine (ZYRTEC ALLERGY) 10 MG tablet Take 1 tablet by mouth daily  KLAUDIA Luna CNP   carvedilol (COREG) 3.125 MG tablet TAKE 1 TABLET TWICE A DAY WITH MEALS  Biju Galiciao, DO   spironolactone (ALDACTONE) 25 MG tablet Take 1 tablet by mouth daily  Yola Bonner MD   losartan (COZAAR) 50 MG tablet Take 1 tablet by mouth daily  Biju S Med, DO   hydrochlorothiazide (MICROZIDE) 12.5 MG capsule Take 1 capsule by mouth daily  Biju S Med, DO   CPAP Machine MISC by Does not apply route  Patrice Jovel DO   blood glucose monitor kit and supplies Test 2 times a day & as needed for symptoms of irregular blood glucose. KLAUDIA Luna CNP   blood glucose monitor strips Use 2 times daily to test sugars  KLAUDIA Luna CNP   Lancets MISC Use 2 times daily  KLAUDIA Luna CNP   aspirin 81 MG tablet Take 81 mg by mouth daily Patient now has 81 mg aspirin  Historical Provider, MD   Acetaminophen (TYLENOL PO) Take  by mouth as needed. Historical Provider, MD        Allergies:  has No Known Allergies.      Past Medical History:   has a past medical history of Abnormal stress test, CHF (congestive heart failure) ([facility] [address] Depression with anxiety, Glucose intolerance (impaired glucose tolerance), Insomnia, Morbid obesity ([facility] [address] Sinusitis, chronic, Tobacco abuse, Unspecified sleep apnea, Varicose vein, and Venous insufficiency. Past Surgical History   has a past surgical history that includes Cardiac catheterization (11/22/2017). Family History  family history includes Cervical Cancer in his mother; Diabetes in his maternal grandfather; Other in his daughter, father, and mother. Social History   reports that he has been smoking cigarettes. He has a 19.00 pack-year smoking history. He has never used smokeless tobacco. He reports current alcohol use. He reports that he does not use drugs. Health Maintenance:    Health Maintenance   Topic Date Due    Hepatitis C screen  Never done    Pneumococcal 0-64 years Vaccine (1 of 1 - PPSV23) Never done    Diabetic foot exam  Never done    HIV screen  Never done    COVID-19 Vaccine (1) Never done    Hepatitis B vaccine (1 of 3 - Risk 3-dose series) Never done    DTaP/Tdap/Td vaccine (1 - Tdap) Never done    Diabetic microalbuminuria test  01/10/2019    Lipid screen  01/10/2019    Diabetic retinal exam  08/26/2020    Flu vaccine (1) Never done    Potassium monitoring  02/19/2022    Creatinine monitoring  02/19/2022    A1C test (Diabetic or Prediabetic)  03/01/2022    Hepatitis A vaccine  Aged Out    Hib vaccine  Aged Out    Meningococcal (ACWY) vaccine  Aged Out       Subjective:      Review of Systems    Objective: There were no vitals taken for this visit. Physical Exam        Assessment       Diagnosis Orders   1. Acute on chronic systolic congestive heart failure (HCC)  Comprehensive Metabolic Panel    furosemide (LASIX) 40 MG tablet    potassium chloride (KLOR-CON M) 20 MEQ extended release tablet   2.  Orthopnea  Comprehensive Metabolic Panel    furosemide (LASIX) 40 MG tablet    potassium chloride (KLOR-CON M) 20 MEQ extended release tablet   3. Coughing  Comprehensive Metabolic Panel    furosemide (LASIX) 40 MG tablet    potassium chloride (KLOR-CON M) 20 MEQ extended release tablet   4. Bilateral leg edema  Comprehensive Metabolic Panel    furosemide (LASIX) 40 MG tablet    potassium chloride (KLOR-CON M) 20 MEQ extended release tablet   5. PND (paroxysmal nocturnal dyspnea)  Comprehensive Metabolic Panel    furosemide (LASIX) 40 MG tablet    potassium chloride (KLOR-CON M) 20 MEQ extended release tablet   6. Exertional shortness of breath  Comprehensive Metabolic Panel    furosemide (LASIX) 40 MG tablet    potassium chloride (KLOR-CON M) 20 MEQ extended release tablet   7. Cough  Comprehensive Metabolic Panel    furosemide (LASIX) 40 MG tablet    potassium chloride (KLOR-CON M) 20 MEQ extended release tablet   8. Former smoker  Comprehensive Metabolic Panel    furosemide (LASIX) 40 MG tablet    potassium chloride (KLOR-CON M) 20 MEQ extended release tablet   9. KADE on CPAP  Comprehensive Metabolic Panel    furosemide (LASIX) 40 MG tablet    potassium chloride (KLOR-CON M) 20 MEQ extended release tablet   10. Chronic systolic congestive heart failure (HCC)  Comprehensive Metabolic Panel    furosemide (LASIX) 40 MG tablet    potassium chloride (KLOR-CON M) 20 MEQ extended release tablet         PLAN   Increase the dose of diuretic therapy Lasix to 40 mg twice daily with potassium 20 mEq twice daily. Patient agrees to get a blood test electrolytes check/kidney function test done next week to ensure their stability as Lasix can affect and altered kidney function test as well as electrolyte abnormalities at the same time to be compliant with low sodium diet 1500 mg and aim for 2,000 ml of fluids a day to prevent any fluid retention. Also to get echo done as soon as he seems to be feeling well.      He is on Losartan, Aldactone, correg and HCTZ therapy as well         Litzy Elizalde, was evaluated through a synchronous (real-time) audio-video encounter. The patient (or guardian if applicable) is aware that this is a billable service. Verbal consent to proceed has been obtained within the past 12 months. The visit was conducted pursuant to the emergency declaration under the [facility], [address] [place] [address] authority and the [employer]. Patient identification was verified, and a caregiver was present when appropriate. The patient was located in a state where the provider was credentialed to provide care. Total time spent for this encounter: 30 mins    --Valente Valderrama MD on 3/22/2021 at 9:03 AM    An electronic signature was used to authenticate this note. Orders Placed This Encounter   Procedures    Comprehensive Metabolic Panel     Standing Status:   Future     Standing Expiration Date:   3/16/2022      =  No follow-ups on file. Patient given educational materials - see patient instructions. Discussed use, benefit, and side effects of prescribed medications. All patient questions answered. Pt voiced understanding. Reviewed health maintenance.        Electronically signed Christine Beasley MD on 3/16/2021 at 2:32 PM EDT

## 2021-03-23 ENCOUNTER — HOSPITAL ENCOUNTER (OUTPATIENT)
Age: 46
Setting detail: SPECIMEN
Discharge: HOME | End: 2021-03-23
Payer: COMMERCIAL

## 2021-03-23 DIAGNOSIS — G47.33 OSA ON CPAP: ICD-10-CM

## 2021-03-23 DIAGNOSIS — Z99.89 OSA ON CPAP: ICD-10-CM

## 2021-03-23 DIAGNOSIS — R06.02 EXERTIONAL SHORTNESS OF BREATH: ICD-10-CM

## 2021-03-23 DIAGNOSIS — Z87.891 FORMER SMOKER: ICD-10-CM

## 2021-03-23 DIAGNOSIS — I50.22 CHRONIC SYSTOLIC CONGESTIVE HEART FAILURE (HCC): ICD-10-CM

## 2021-03-23 DIAGNOSIS — R06.01 ORTHOPNEA: ICD-10-CM

## 2021-03-23 DIAGNOSIS — I50.23 ACUTE ON CHRONIC SYSTOLIC CONGESTIVE HEART FAILURE (HCC): ICD-10-CM

## 2021-03-23 DIAGNOSIS — R06.00 PND (PAROXYSMAL NOCTURNAL DYSPNEA): ICD-10-CM

## 2021-03-23 DIAGNOSIS — R05.9 COUGHING: ICD-10-CM

## 2021-03-23 DIAGNOSIS — R60.0 BILATERAL LEG EDEMA: ICD-10-CM

## 2021-03-23 DIAGNOSIS — R05.9 COUGH: ICD-10-CM

## 2021-03-23 LAB
ALBUMIN SERPL-MCNC: 4 G/DL (ref 3.5–5.2)
ALBUMIN/GLOBULIN RATIO: 1.3 (ref 1–2.5)
ALP BLD-CCNC: 67 U/L (ref 40–129)
ALT SERPL-CCNC: 27 U/L (ref 5–41)
ANION GAP SERPL CALCULATED.3IONS-SCNC: 9 MMOL/L (ref 9–17)
AST SERPL-CCNC: 27 U/L
BILIRUB SERPL-MCNC: 0.47 MG/DL (ref 0.3–1.2)
BUN BLDV-MCNC: 16 MG/DL (ref 6–20)
BUN/CREAT BLD: 24 (ref 9–20)
CALCIUM SERPL-MCNC: 10 MG/DL (ref 8.6–10.4)
CHLORIDE BLD-SCNC: 97 MMOL/L (ref 98–107)
CO2: 28 MMOL/L (ref 20–31)
CREAT SERPL-MCNC: 0.66 MG/DL (ref 0.7–1.2)
GFR AFRICAN AMERICAN: >60 ML/MIN
GFR NON-AFRICAN AMERICAN: >60 ML/MIN
GFR SERPL CREATININE-BSD FRML MDRD: ABNORMAL ML/MIN/{1.73_M2}
GFR SERPL CREATININE-BSD FRML MDRD: ABNORMAL ML/MIN/{1.73_M2}
GLUCOSE BLD-MCNC: 190 MG/DL (ref 70–99)
POTASSIUM SERPL-SCNC: 4.4 MMOL/L (ref 3.7–5.3)
SODIUM BLD-SCNC: 134 MMOL/L (ref 135–144)
TOTAL PROTEIN: 7.2 G/DL (ref 6.4–8.3)

## 2021-03-23 PROCEDURE — 80053 COMPREHEN METABOLIC PANEL: CPT

## 2021-03-23 PROCEDURE — 36415 COLL VENOUS BLD VENIPUNCTURE: CPT

## 2021-04-19 ENCOUNTER — OFFICE VISIT (OUTPATIENT)
Dept: FAMILY MEDICINE CLINIC | Age: 46
End: 2021-04-19
Payer: COMMERCIAL

## 2021-04-19 DIAGNOSIS — I50.23 SYSTOLIC CHF, ACUTE ON CHRONIC (HCC): Primary | ICD-10-CM

## 2021-04-19 DIAGNOSIS — I95.9 HYPOTENSION, UNSPECIFIED HYPOTENSION TYPE: ICD-10-CM

## 2021-04-19 DIAGNOSIS — I49.5 BRADY-TACHY SYNDROME (HCC): ICD-10-CM

## 2021-04-19 PROCEDURE — 99214 OFFICE O/P EST MOD 30 MIN: CPT | Performed by: INTERNAL MEDICINE

## 2021-04-19 NOTE — PROGRESS NOTES
[address]  [address]  Dept: [phone]  Dept Fax: [phone]: [phone]     Visit Date:  4/19/2021    Patient:  Mayank Alvarez  YOB: 1975    HPI:   Mayank Alvarez presents today for   Chief Complaint   Patient presents with    Other     patient is here for a follow up. Jamee Coffey HPI 40year old male with a history of venous insufficiency, varicose vein, hypertension, morbid obesity BMI of 46, sleep apnea on CPAP, diabetes mellitus type 2 A1C 8.6, tobacco abuse 1ppd 25 years on/off, depression anxiety insomnia, systolic CHF nonischemic cardiomyopathy last echo showed ejection fraction of 30 to 35% is coming in for a follow up/recent echo results. Dilated Cardiomyopathy-cardiomyopathy with recent echo showing left ventricular dilatation to 7.8 cm with ejection fraction of 35%. Patient reports since increased dose of Lasix to 40 mg twice daily with potassium he has noticed a lot of improvement in his symptoms more than 20 pounds decreased coughing spells/orthopnea related symptoms. On our vital signs examination today he was noted to be mildly hypotensive but he is completely asymptomatic. We checked his blood pressure twice manually and was around 106/80s with HR fluctuating from 50s to more than>100. No irregular rhythm palpated on my physical examination. Sometimes reports exertional dyspnea.  on Lasix 40 mg BID with Potassium , Correg 3.125 mg BID, aldactone 25 mg, Losartan 50 mg, HCTZ 12.5 mg.    Recent echo    1.  CHF since 2017 without ICD.     2.  Severely dilated LV at 7.8 cm.     3.  Ejection fraction is 35%.     4.  All valves appear normal.     5.  Mild mitral regurgitation.     6.  Trivial tricuspid regurgitation with an RVSP of 14 mmHg.     7.  No effusion.       8.  Poor image quality due to body habitus.     9.  Best image quality is the subcostal view.   2018 echo. Medications  Prior to Visit Medications    Medication Sig Taking? Authorizing Provider   furosemide (LASIX) 40 MG tablet Take 1 tablet by mouth 2 times daily Take 1 tablet 40 mg twice daily. Yes Akua Griffith MD   potassium chloride (KLOR-CON M) 20 MEQ extended release tablet Take 1 tablet by mouth 2 times daily Yes Akua Griffith MD   Dulaglutide (TRULICITY) 4.04 VY/1.4SU SOPN Inject 0.75 mg into the skin once a week Yes Akua Griffith MD   montelukast (SINGULAIR) 10 MG tablet take 1 tablet by mouth at bedtime Yes KLAUDIA Lott CNP   metFORMIN (GLUCOPHAGE-XR) 500 MG extended release tablet take 2 tablets by mouth twice a day Yes KLAUDIA Lott CNP   atorvastatin (LIPITOR) 20 MG tablet Take 1 tablet by mouth daily Yes KLAUDIA Lott CNP   cetirizine (ZYRTEC ALLERGY) 10 MG tablet Take 1 tablet by mouth daily Yes KLAUDIA Lott CNP   carvedilol (COREG) 3.125 MG tablet TAKE 1 TABLET TWICE A DAY WITH MEALS Yes Biju Jovel DO   spironolactone (ALDACTONE) 25 MG tablet Take 1 tablet by mouth daily Yes Willa Pascual MD   losartan (COZAAR) 50 MG tablet Take 1 tablet by mouth daily Yes Biju Jovel DO   hydrochlorothiazide (MICROZIDE) 12.5 MG capsule Take 1 capsule by mouth daily Yes Biju Jovel DO   CPAP Machine MISC by Does not apply route Yes Patrice Jovel DO   blood glucose monitor kit and supplies Test 2 times a day & as needed for symptoms of irregular blood glucose. Yes KLAUDIA Lott CNP   blood glucose monitor strips Use 2 times daily to test sugars Yes KLAUDIA Lott CNP   Lancets MISC Use 2 times daily Yes KLAUDIA Lott CNP   aspirin 81 MG tablet Take 81 mg by mouth daily Patient now has 81 mg aspirin Yes Historical Provider, MD   Acetaminophen (TYLENOL PO) Take  by mouth as needed.  Yes Historical Provider, MD   azelastine (ASTELIN) 0.1 % nasal spray 1 spray by Nasal route 2 times daily Use in each nostril as directed  Patient not taking: Reported on 4/19/2021  Sangita Sousa MD        Allergies:  has No Known Allergies. Past Medical History:   has a past medical history of Abnormal stress test, CHF (congestive heart failure) ([facility] [address] Depression with anxiety, Glucose intolerance (impaired glucose tolerance), Insomnia, Morbid obesity ([address] Sinusitis, chronic, Tobacco abuse, Unspecified sleep apnea, Varicose vein, and Venous insufficiency. Past Surgical History   has a past surgical history that includes Cardiac catheterization (11/22/2017). Family History  family history includes Cervical Cancer in his mother; Diabetes in his maternal grandfather; Other in his daughter, father, and mother. Social History   reports that he has been smoking cigarettes. He has a 19.00 pack-year smoking history. He has never used smokeless tobacco. He reports current alcohol use. He reports that he does not use drugs. Health Maintenance:    Health Maintenance   Topic Date Due    Hepatitis C screen  Never done    Pneumococcal 0-64 years Vaccine (1 of 1 - PPSV23) Never done    Diabetic foot exam  Never done    HIV screen  Never done    COVID-19 Vaccine (1) Never done    Hepatitis B vaccine (1 of 3 - Risk 3-dose series) Never done    DTaP/Tdap/Td vaccine (1 - Tdap) Never done    Diabetic microalbuminuria test  01/10/2019    Lipid screen  01/10/2019    Diabetic retinal exam  08/26/2020    Flu vaccine (Season Ended) 09/01/2021    A1C test (Diabetic or Prediabetic)  03/01/2022    Potassium monitoring  03/23/2022    Creatinine monitoring  03/23/2022    Hepatitis A vaccine  Aged Out    Hib vaccine  Aged Out    Meningococcal (ACWY) vaccine  Aged Out       Subjective:      Review of Systems   Constitutional: Negative for fatigue, fever and unexpected weight change. HENT: Negative for ear pain, postnasal drip, rhinorrhea, sinus pain, sore throat and trouble swallowing. Eyes: Negative for visual disturbance.    Respiratory: Positive for cough and shortness of breath. Negative for chest tightness. Cardiovascular: Positive for leg swelling. Negative for chest pain. Gastrointestinal: Negative for abdominal pain, blood in stool and diarrhea. Endocrine: Negative for polyuria. Genitourinary: Negative for difficulty urinating and flank pain. Musculoskeletal: Negative for arthralgias, joint swelling and myalgias. Skin: Negative for rash. Allergic/Immunologic: Negative for environmental allergies. Neurological: Negative for weakness, light-headedness, numbness and headaches. Hematological: Negative for adenopathy. Psychiatric/Behavioral: Negative for behavioral problems and suicidal ideas. The patient is not nervous/anxious. Objective:     Pulse 54   Wt (!) 328 lb (148.8 kg)   SpO2 92%   BMI 45.75 kg/m²     Physical Exam  Vitals signs and nursing note reviewed. HENT:      Head: Normocephalic and atraumatic. Cardiovascular:      Rate and Rhythm: Regular rhythm. Bradycardia present. Pulses: Normal pulses. Heart sounds: Normal heart sounds. Pulmonary:      Breath sounds: No stridor. Abdominal:      Palpations: Abdomen is soft. Musculoskeletal:      Right lower leg: Edema present. Left lower leg: Edema present. Skin:     General: Skin is warm. Neurological:      Mental Status: He is oriented to person, place, and time. Mental status is at baseline. Psychiatric:         Mood and Affect: Mood normal.             Assessment       Diagnosis Orders   1. Systolic CHF, acute on chronic (HCC)     2. Callie-tachy syndrome ([address]     3. Hypotension, unspecified hypotension type           PLAN   I shall go down on his lasix to  40 mg once a day to prevent further preload reducing medications which would be further causing worsening hypotension/maybe reflex tachycardia . He is going in/out of tachy/callie arrhythmia I believe. He was not in A. fib at least on my examination today but no EKG was performed.  He is completely asymptomatic he feels the best he reports in the past few weeks. Briefly discussed the case with Dr. Lindalee Spatz, MD Cardiologist who works with Dr. Julito May about his recent changes in echocardiogram and his symptoms. We made an appointment for him to see Dr Sukhdeep Szymanski. Around May 5. Patient is really hoping that if somebody could actually sit down explained him in detail what does the pacemaker defibrillator placement entails specially given the fact that he works as an  all the risk and benefits of it which he should definitely know about it. Plus he would need further medication optimization with possible Entresto plus minus digoxin/inotropic agents as indicated. No orders of the defined types were placed in this encounter. No follow-ups on file. Patient given educational materials - see patient instructions. Discussed use, benefit, and side effects of prescribed medications. All patient questions answered. Pt voiced understanding. Reviewed health maintenance.        Electronically signed Marii Hitchcock MD on 4/19/2021 at 9:37 AM EDT

## 2021-04-20 DIAGNOSIS — R04.2 HEMOPTYSIS: ICD-10-CM

## 2021-04-20 DIAGNOSIS — R60.0 BILATERAL LEG EDEMA: ICD-10-CM

## 2021-04-20 DIAGNOSIS — E11.9 TYPE 2 DIABETES MELLITUS WITHOUT COMPLICATION, WITHOUT LONG-TERM CURRENT USE OF INSULIN (HCC): ICD-10-CM

## 2021-04-20 DIAGNOSIS — F17.200 SMOKER: ICD-10-CM

## 2021-04-20 DIAGNOSIS — R05.9 COUGH: ICD-10-CM

## 2021-04-20 DIAGNOSIS — J34.89 SINUS DRAINAGE: ICD-10-CM

## 2021-04-20 DIAGNOSIS — I50.22 CHRONIC SYSTOLIC CONGESTIVE HEART FAILURE (HCC): ICD-10-CM

## 2021-04-20 DIAGNOSIS — R06.01 ORTHOPNEA: ICD-10-CM

## 2021-04-21 VITALS
WEIGHT: 315 LBS | SYSTOLIC BLOOD PRESSURE: 106 MMHG | HEART RATE: 54 BPM | DIASTOLIC BLOOD PRESSURE: 77 MMHG | BODY MASS INDEX: 45.75 KG/M2 | OXYGEN SATURATION: 92 %

## 2021-04-21 ASSESSMENT — ENCOUNTER SYMPTOMS
DIARRHEA: 0
RHINORRHEA: 0
TROUBLE SWALLOWING: 0
SHORTNESS OF BREATH: 1
SORE THROAT: 0
CHEST TIGHTNESS: 0
SINUS PAIN: 0
COUGH: 1
BLOOD IN STOOL: 0
ABDOMINAL PAIN: 0

## 2021-05-18 ENCOUNTER — OFFICE VISIT (OUTPATIENT)
Dept: FAMILY MEDICINE CLINIC | Age: 46
End: 2021-05-18
Payer: COMMERCIAL

## 2021-05-18 ENCOUNTER — HOSPITAL ENCOUNTER (OUTPATIENT)
Age: 46
Setting detail: SPECIMEN
Discharge: HOME | End: 2021-05-18
Payer: COMMERCIAL

## 2021-05-18 VITALS
WEIGHT: 315 LBS | DIASTOLIC BLOOD PRESSURE: 68 MMHG | BODY MASS INDEX: 45.33 KG/M2 | HEART RATE: 109 BPM | OXYGEN SATURATION: 97 % | SYSTOLIC BLOOD PRESSURE: 112 MMHG

## 2021-05-18 DIAGNOSIS — I42.0 DILATED CARDIOMYOPATHY (HCC): Primary | ICD-10-CM

## 2021-05-18 DIAGNOSIS — I50.22 CHRONIC SYSTOLIC CONGESTIVE HEART FAILURE (HCC): ICD-10-CM

## 2021-05-18 DIAGNOSIS — Z72.0 TOBACCO ABUSE: ICD-10-CM

## 2021-05-18 DIAGNOSIS — I42.0 DILATED CARDIOMYOPATHY (HCC): ICD-10-CM

## 2021-05-18 DIAGNOSIS — I44.7 LBBB (LEFT BUNDLE BRANCH BLOCK): ICD-10-CM

## 2021-05-18 DIAGNOSIS — R00.0 SINUS TACHYCARDIA: ICD-10-CM

## 2021-05-18 LAB
ALBUMIN SERPL-MCNC: 3.9 G/DL (ref 3.5–5.2)
ALBUMIN/GLOBULIN RATIO: 1.2 (ref 1–2.5)
ALP BLD-CCNC: 75 U/L (ref 40–129)
ALT SERPL-CCNC: 16 U/L (ref 5–41)
ANION GAP SERPL CALCULATED.3IONS-SCNC: 10 MMOL/L (ref 9–17)
AST SERPL-CCNC: 19 U/L
BILIRUB SERPL-MCNC: 0.58 MG/DL (ref 0.3–1.2)
BUN BLDV-MCNC: 14 MG/DL (ref 6–20)
BUN/CREAT BLD: 22 (ref 9–20)
CALCIUM SERPL-MCNC: 9.4 MG/DL (ref 8.6–10.4)
CHLORIDE BLD-SCNC: 96 MMOL/L (ref 98–107)
CO2: 27 MMOL/L (ref 20–31)
CREAT SERPL-MCNC: 0.65 MG/DL (ref 0.7–1.2)
GFR AFRICAN AMERICAN: >60 ML/MIN
GFR NON-AFRICAN AMERICAN: >60 ML/MIN
GFR SERPL CREATININE-BSD FRML MDRD: ABNORMAL ML/MIN/{1.73_M2}
GFR SERPL CREATININE-BSD FRML MDRD: ABNORMAL ML/MIN/{1.73_M2}
GLUCOSE BLD-MCNC: 193 MG/DL (ref 70–99)
POTASSIUM SERPL-SCNC: 4.1 MMOL/L (ref 3.7–5.3)
SODIUM BLD-SCNC: 133 MMOL/L (ref 135–144)
TOTAL PROTEIN: 7.2 G/DL (ref 6.4–8.3)

## 2021-05-18 PROCEDURE — 80053 COMPREHEN METABOLIC PANEL: CPT

## 2021-05-18 PROCEDURE — 93000 ELECTROCARDIOGRAM COMPLETE: CPT | Performed by: INTERNAL MEDICINE

## 2021-05-18 PROCEDURE — 36415 COLL VENOUS BLD VENIPUNCTURE: CPT

## 2021-05-18 PROCEDURE — 99214 OFFICE O/P EST MOD 30 MIN: CPT | Performed by: INTERNAL MEDICINE

## 2021-05-18 NOTE — PROGRESS NOTES
[address]  [address]  Dept: [phone]  Dept Fax: [phone]: [phone]     Visit Date:  5/18/2021    Patient:  Harman Quintero  YOB: 1975    HPI:   Harman Quintero presents today for   Chief Complaint   Patient presents with    Hypertension     patient is here today for a follow up with no concerns    . HPI 40year old male with a history of venous insufficiency, varicose vein, hypertension, morbid obesity BMI of 46, sleep apnea on CPAP, diabetes mellitus type 2 A1C 8.6, tobacco abuse 1ppd 25 years on/off, depression anxiety insomnia, systolic CHF nonischemic cardiomyopathy last echo showed ejection fraction of 30 to 35% is coming in for a for a 4 week follow up. Dilated cardiomyopathy/systolic heart failure- cardiomyopathy with recent echo showing left ventricular dilatation to 7.8 cm with ejection fraction of 35%. Symptoms wise he feels so much better denies worsening dyspnea/orthopnea/leg edema. Lost more than 25 lbs> within last few months. On lasix +k 40 MG once a day . Correg 3.125 mg BID, aldactone 25 mg, Losartan 50 mg, HCTZ 12.5 mg. EKG done in the office showed sinus tachycardia with regular rhythm and LBBB, was seen by cardiology recently. Still remain unsure about AICD.         Medications  Prior to Visit Medications    Medication Sig Taking? Authorizing Provider   atorvastatin (LIPITOR) 20 MG tablet take 1 tablet by mouth once daily Yes Bro Guo MD   furosemide (LASIX) 40 MG tablet Take 1 tablet by mouth 2 times daily Take 1 tablet 40 mg twice daily.  Yes Bro Guo MD   potassium chloride (KLOR-CON M) 20 MEQ extended release tablet Take 1 tablet by mouth 2 times daily Yes Bro Guo MD   Dulaglutide (TRULICITY) 4.63 WW/4.2HE SOPN Inject 0.75 mg into the skin once a week Yes Bro Guo MD   montelukast (SINGULAIR) 10 MG tablet take 1 tablet by mouth at bedtime Yes KLAUDIA Marshall CNP   metFORMIN (GLUCOPHAGE-XR) 500 MG extended release tablet take 2 tablets by mouth twice a day Yes KLAUDIA Marshall CNP   cetirizine (ZYRTEC ALLERGY) 10 MG tablet Take 1 tablet by mouth daily Yes KLAUDIA Marshall CNP   carvedilol (COREG) 3.125 MG tablet TAKE 1 TABLET TWICE A DAY WITH MEALS Yes Biju Jovel DO   spironolactone (ALDACTONE) 25 MG tablet Take 1 tablet by mouth daily Yes Pamela Oconnor MD   losartan (COZAAR) 50 MG tablet Take 1 tablet by mouth daily Yes Biju Jovel DO   hydrochlorothiazide (MICROZIDE) 12.5 MG capsule Take 1 capsule by mouth daily Yes Biju Jovel DO   CPAP Machine MISC by Does not apply route Yes Patrice Jovel DO   blood glucose monitor kit and supplies Test 2 times a day & as needed for symptoms of irregular blood glucose. Yes KLAUDIA Marshall CNP   blood glucose monitor strips Use 2 times daily to test sugars Yes KLAUDIA Marshall CNP   Lancets MISC Use 2 times daily Yes KLAUDIA Marshall CNP   aspirin 81 MG tablet Take 81 mg by mouth daily Patient now has 81 mg aspirin Yes Historical Provider, MD   Acetaminophen (TYLENOL PO) Take  by mouth as needed. Yes Historical Provider, MD   azelastine (ASTELIN) 0.1 % nasal spray 1 spray by Nasal route 2 times daily Use in each nostril as directed  Patient not taking: Reported on 4/19/2021  Rena Covarrubias MD        Allergies:  has No Known Allergies. Past Medical History:   has a past medical history of Abnormal stress test, CHF (congestive heart failure) ([facility] [address] Depression with anxiety, Glucose intolerance (impaired glucose tolerance), Insomnia, Morbid obesity ([facility] [address] Sinusitis, chronic, Tobacco abuse, Unspecified sleep apnea, Varicose vein, and Venous insufficiency. Past Surgical History   has a past surgical history that includes Cardiac catheterization (11/22/2017).     Family History  family history includes Cervical Cancer in his mother; Psychiatric/Behavioral: Negative for behavioral problems and suicidal ideas. The patient is not nervous/anxious. Objective:     /68 (Site: Left Upper Arm, Position: Sitting)   Pulse 109   Wt (!) 325 lb (147.4 kg)   SpO2 97%   BMI 45.33 kg/m²     Physical Exam  Vitals and nursing note reviewed. HENT:      Head: Normocephalic and atraumatic. Cardiovascular:      Rate and Rhythm: Normal rate and regular rhythm. Pulses: Normal pulses. Heart sounds: Normal heart sounds. No murmur heard. No gallop. Pulmonary:      Effort: Pulmonary effort is normal.      Breath sounds: Normal breath sounds. No stridor. Abdominal:      General: Abdomen is flat. Bowel sounds are normal. There is no distension. Palpations: Abdomen is soft. There is no mass. Tenderness: There is no abdominal tenderness. There is no right CVA tenderness, left CVA tenderness, guarding or rebound. Hernia: No hernia is present. Musculoskeletal:         General: Normal range of motion. Cervical back: Normal range of motion. Right lower leg: Edema present. Left lower leg: Edema present. Comments: 2+pitting edema    Skin:     General: Skin is warm. Neurological:      Mental Status: He is oriented to person, place, and time. Mental status is at baseline. Psychiatric:         Mood and Affect: Mood normal.             Assessment       Diagnosis Orders   1. Dilated cardiomyopathy ([address]  Comprehensive Metabolic Panel   2. Chronic systolic congestive heart failure (HCC)  Comprehensive Metabolic Panel   3. Tobacco abuse  Comprehensive Metabolic Panel   4. LBBB (left bundle branch block)     5. Sinus tachycardia           PLAN     Check CMP/electrolytes  Follow up in 6 weeks  Limit salt upto 1500 mg per day, 1.5-2L of fluid a day-CHF diet.          Orders Placed This Encounter   Procedures    Comprehensive Metabolic Panel     Standing Status:   Future     Number of Occurrences:   1     Standing Expiration Date:   5/18/2022    EKG 12 Lead     Order Specific Question:   Reason for Exam?     Answer:   Chest pain        No follow-ups on file. Patient given educational materials - see patient instructions. Discussed use, benefit, and side effects of prescribed medications. All patient questions answered. Pt voiced understanding. Reviewed health maintenance.        Electronically signed Sima Schofield MD on 5/18/2021 at 10:17 AM EDT

## 2021-05-20 ASSESSMENT — ENCOUNTER SYMPTOMS
TROUBLE SWALLOWING: 0
SORE THROAT: 0
COUGH: 0
RHINORRHEA: 0
SINUS PAIN: 0
BLOOD IN STOOL: 0
CHEST TIGHTNESS: 0
SHORTNESS OF BREATH: 0
DIARRHEA: 0
ABDOMINAL PAIN: 0

## 2021-05-21 DIAGNOSIS — E11.9 TYPE 2 DIABETES MELLITUS WITHOUT COMPLICATION, WITHOUT LONG-TERM CURRENT USE OF INSULIN (HCC): ICD-10-CM

## 2021-05-21 DIAGNOSIS — R05.9 COUGH: ICD-10-CM

## 2021-05-21 RX ORDER — MONTELUKAST SODIUM 10 MG/1
TABLET ORAL
Qty: 30 TABLET | Refills: 2 | Status: SHIPPED | OUTPATIENT
Start: 2021-05-21 | End: 2021-08-23

## 2021-05-21 RX ORDER — METFORMIN HYDROCHLORIDE 500 MG/1
TABLET, EXTENDED RELEASE ORAL
Qty: 120 TABLET | Refills: 2 | Status: SHIPPED | OUTPATIENT
Start: 2021-05-21 | End: 2021-08-23

## 2021-07-06 DIAGNOSIS — I50.23 ACUTE ON CHRONIC SYSTOLIC CONGESTIVE HEART FAILURE (HCC): ICD-10-CM

## 2021-07-06 DIAGNOSIS — Z99.89 OSA ON CPAP: ICD-10-CM

## 2021-07-06 DIAGNOSIS — R06.02 EXERTIONAL SHORTNESS OF BREATH: ICD-10-CM

## 2021-07-06 DIAGNOSIS — R06.00 PND (PAROXYSMAL NOCTURNAL DYSPNEA): ICD-10-CM

## 2021-07-06 DIAGNOSIS — R06.01 ORTHOPNEA: ICD-10-CM

## 2021-07-06 DIAGNOSIS — R05.9 COUGH: ICD-10-CM

## 2021-07-06 DIAGNOSIS — R05.9 COUGHING: ICD-10-CM

## 2021-07-06 DIAGNOSIS — G47.33 OSA ON CPAP: ICD-10-CM

## 2021-07-06 DIAGNOSIS — Z87.891 FORMER SMOKER: ICD-10-CM

## 2021-07-06 DIAGNOSIS — R60.0 BILATERAL LEG EDEMA: ICD-10-CM

## 2021-07-06 DIAGNOSIS — I50.22 CHRONIC SYSTOLIC CONGESTIVE HEART FAILURE (HCC): ICD-10-CM

## 2021-07-07 RX ORDER — FUROSEMIDE 40 MG/1
40 TABLET ORAL 2 TIMES DAILY
Qty: 90 TABLET | Refills: 2 | OUTPATIENT
Start: 2021-07-07

## 2021-07-13 ENCOUNTER — OFFICE VISIT (OUTPATIENT)
Dept: FAMILY MEDICINE CLINIC | Age: 46
End: 2021-07-13
Payer: COMMERCIAL

## 2021-07-13 VITALS
OXYGEN SATURATION: 95 % | HEART RATE: 107 BPM | DIASTOLIC BLOOD PRESSURE: 68 MMHG | SYSTOLIC BLOOD PRESSURE: 114 MMHG | WEIGHT: 315 LBS | BODY MASS INDEX: 46.86 KG/M2

## 2021-07-13 DIAGNOSIS — R60.0 BILATERAL LEG EDEMA: ICD-10-CM

## 2021-07-13 DIAGNOSIS — G47.33 OBSTRUCTIVE SLEEP APNEA SYNDROME: ICD-10-CM

## 2021-07-13 DIAGNOSIS — I50.22 CHRONIC SYSTOLIC CONGESTIVE HEART FAILURE (HCC): ICD-10-CM

## 2021-07-13 DIAGNOSIS — E11.69 TYPE 2 DIABETES MELLITUS WITH OTHER SPECIFIED COMPLICATION, WITHOUT LONG-TERM CURRENT USE OF INSULIN (HCC): ICD-10-CM

## 2021-07-13 DIAGNOSIS — I42.0 DILATED CARDIOMYOPATHY (HCC): Primary | ICD-10-CM

## 2021-07-13 DIAGNOSIS — E11.9 TYPE 2 DIABETES MELLITUS WITHOUT COMPLICATION, WITHOUT LONG-TERM CURRENT USE OF INSULIN (HCC): ICD-10-CM

## 2021-07-13 DIAGNOSIS — F17.200 SMOKER: ICD-10-CM

## 2021-07-13 DIAGNOSIS — G47.33 OSA ON CPAP: ICD-10-CM

## 2021-07-13 DIAGNOSIS — Z99.89 OSA ON CPAP: ICD-10-CM

## 2021-07-13 DIAGNOSIS — I42.8 NON-ISCHEMIC CARDIOMYOPATHY (HCC): ICD-10-CM

## 2021-07-13 PROCEDURE — 3052F HG A1C>EQUAL 8.0%<EQUAL 9.0%: CPT | Performed by: INTERNAL MEDICINE

## 2021-07-13 PROCEDURE — 99214 OFFICE O/P EST MOD 30 MIN: CPT | Performed by: INTERNAL MEDICINE

## 2021-07-13 RX ORDER — DULAGLUTIDE 0.75 MG/.5ML
0.75 INJECTION, SOLUTION SUBCUTANEOUS WEEKLY
Qty: 5 PEN | Refills: 2 | Status: SHIPPED | OUTPATIENT
Start: 2021-07-13 | End: 2022-01-19

## 2021-07-13 RX ORDER — POTASSIUM CHLORIDE 20 MEQ/1
20 TABLET, EXTENDED RELEASE ORAL DAILY
Qty: 60 TABLET | Refills: 1 | Status: SHIPPED | OUTPATIENT
Start: 2021-07-13

## 2021-07-13 NOTE — PROGRESS NOTES
[address]  [address] [address] [address] [address]  Dept: [phone]  Dept Fax: [phone]  Loc: [phone]     Visit Date:  7/13/2021    Patient:  Tim Mcdonald  YOB: 1975    HPI:   Tim Mcdonald presents today for   Chief Complaint   Patient presents with    Leg Swelling     patient has noticed a lot more swelling in his legs. Christiano Curran HPI 40year old male with a history of venous insufficiency, varicose vein, hypertension, morbid obesity BMI of 46, sleep apnea on CPAP, diabetes mellitus type 2 A1C 8.6, tobacco abuse 1ppd 25 years on/off, depression anxiety insomnia, systolic CHF nonischemic cardiomyopathy last echo showed ejection fraction of 30 to 35% is coming in for a for a 4 week follow up. Dilated cardiomyopathy/systolic heart failure- cardiomyopathy with recent echo showing left ventricular dilatation to 7.8 cm with ejection fraction of 35%. Symptoms wise he feels so much better denies worsening dyspnea/orthopnea/leg edema. Lost more than 25 lbs> within last few months. On lasix +k 40 MG once a day  Correg 3.125 mg BID, aldactone 25 mg, Losartan 50 mg, HCTZ 12.5 mg., was seen by cardiology recently. Still remain unsure about AICD. Weight gain >10 lbs. Legs are swollen -- 2 weeks-- right leg> > left leg. -    Saw cardiology-- stress test- getting that approved. Smoke-- off/on having nicotine patch--  1ppd x 19 years. DM type 2- has not been able to get trulicity from the pharmacy? Need to call and clarify. Not checking blood sugars often. Hemoglobin S9B 8.6    trulicity +metformin 8643 mg BID    KADE-wear CPAP-- wakes up without it sometimes so not fully compliant. Medications  Prior to Visit Medications    Medication Sig Taking? Authorizing Provider   furosemide (LASIX) 40 MG tablet Take 1 tablet by mouth once a day.  Yes Dorothy Tidwell MD   metFORMIN ([facility]) 500 MG extended release tablet take 2 tablets by mouth twice a day Yes Edward Bishop MD   montelukast (SINGULAIR) 10 MG tablet take 1 tablet by mouth at bedtime Yes Edward Bishop MD   potassium chloride (KLOR-CON M) 20 MEQ extended release tablet Take 1 tablet by mouth 2 times daily Yes Edward Bishop MD   cetirizine (ZYRTEC ALLERGY) 10 MG tablet Take 1 tablet by mouth daily Yes KLAUDIA Wheeler CNP   carvedilol (COREG) 3.125 MG tablet TAKE 1 TABLET TWICE A DAY WITH MEALS Yes Biju Jovel,    spironolactone (ALDACTONE) 25 MG tablet Take 1 tablet by mouth daily Yes Wu Hurt MD   losartan (COZAAR) 50 MG tablet Take 1 tablet by mouth daily Yes Biju Jovel DO   hydrochlorothiazide (MICROZIDE) 12.5 MG capsule Take 1 capsule by mouth daily Yes Biju Jovel, DO   CPAP Machine MISC by Does not apply route Yes Patrice Jovel,    blood glucose monitor kit and supplies Test 2 times a day & as needed for symptoms of irregular blood glucose. Yes KLAUDIA Wheeler CNP   blood glucose monitor strips Use 2 times daily to test sugars Yes KLAUDIA Wheeler CNP   Lancets MISC Use 2 times daily Yes KLAUDIA Wheeler CNP   aspirin 81 MG tablet Take 81 mg by mouth daily Patient now has 81 mg aspirin Yes Historical Provider, MD   Acetaminophen (TYLENOL PO) Take  by mouth as needed. Yes Historical Provider, MD   atorvastatin (LIPITOR) 20 MG tablet take 1 tablet by mouth once daily  Patient not taking: Reported on 7/13/2021  Edward Bishop MD   azelastine (ASTELIN) 0.1 % nasal spray 1 spray by Nasal route 2 times daily Use in each nostril as directed  Patient not taking: Reported on 4/19/2021  Edward Bishop MD   Dulaglutide (TRULICITY) 4.18 WG/3.8YG SOPN Inject 0.75 mg into the skin once a week  Patient not taking: Reported on 7/13/2021  Edward Bishop MD        Allergies:  has No Known Allergies.      Past Medical History:   has a past medical history of Abnormal stress test, CHF (congestive heart failure) ([facility] [address] Depression with anxiety, Glucose intolerance (impaired glucose tolerance), Insomnia, Morbid obesity ([facility] [address] Sinusitis, chronic, Tobacco abuse, Unspecified sleep apnea, Varicose vein, and Venous insufficiency. Past Surgical History   has a past surgical history that includes Cardiac catheterization (11/22/2017). Family History  family history includes Cervical Cancer in his mother; Diabetes in his maternal grandfather; Other in his daughter, father, and mother. Social History   reports that he has been smoking cigarettes. He has a 19.00 pack-year smoking history. He has never used smokeless tobacco. He reports current alcohol use. He reports that he does not use drugs. Health Maintenance:    Health Maintenance   Topic Date Due    Hepatitis C screen  Never done    Pneumococcal 0-64 years Vaccine (1 of 2 - PPSV23) Never done    Diabetic foot exam  Never done    COVID-19 Vaccine (1) Never done    HIV screen  Never done    Hepatitis B vaccine (1 of 3 - Risk 3-dose series) Never done    DTaP/Tdap/Td vaccine (1 - Tdap) Never done    Diabetic microalbuminuria test  01/10/2019    Lipid screen  01/10/2019    Diabetic retinal exam  08/26/2020    Colon cancer screen colonoscopy  Never done    Flu vaccine (1) 09/01/2021    A1C test (Diabetic or Prediabetic)  03/01/2022    Potassium monitoring  05/18/2022    Creatinine monitoring  05/18/2022    Hepatitis A vaccine  Aged Out    Hib vaccine  Aged Out    Meningococcal (ACWY) vaccine  Aged Out       Subjective:      Review of Systems   Constitutional: Positive for fatigue. Negative for fever and unexpected weight change. HENT: Positive for dental problem. Negative for ear pain, postnasal drip, rhinorrhea, sinus pain, sore throat and trouble swallowing. Eyes: Negative for visual disturbance. Respiratory: Negative for cough, chest tightness and shortness of breath. Cardiovascular: Positive for leg swelling. Negative for chest pain. Gastrointestinal: Negative for abdominal pain, blood in stool and diarrhea. Endocrine: Negative for polyuria. Genitourinary: Negative for difficulty urinating and flank pain. Musculoskeletal: Negative for arthralgias, joint swelling and myalgias. Skin: Negative for rash. Allergic/Immunologic: Negative for environmental allergies. Neurological: Negative for weakness, light-headedness, numbness and headaches. Hematological: Negative for adenopathy. Psychiatric/Behavioral: Negative for behavioral problems and suicidal ideas. The patient is not nervous/anxious. Objective:     /68 (Site: Right Upper Arm, Position: Sitting)   Pulse 107   Wt (!) 336 lb (152.4 kg)   SpO2 95%   BMI 46.86 kg/m²     Physical Exam  Vitals and nursing note reviewed. HENT:      Head: Normocephalic and atraumatic. Cardiovascular:      Rate and Rhythm: Normal rate and regular rhythm. Pulses: Normal pulses. Heart sounds: Normal heart sounds. No murmur heard. No friction rub. No gallop. Pulmonary:      Effort: Pulmonary effort is normal.      Breath sounds: No stridor. Rales present. Comments: Bilateral expiratory crackles at the bases  Abdominal:      General: Abdomen is flat. Bowel sounds are normal.      Palpations: Abdomen is soft. Musculoskeletal:      Right lower leg: Edema present. Left lower leg: Edema present. Comments: 3+pitting edema radiating upto the knees   Skin:     General: Skin is warm. Neurological:      Mental Status: He is oriented to person, place, and time. Mental status is at baseline. Psychiatric:         Mood and Affect: Mood normal.             Assessment       Diagnosis Orders   1. Dilated cardiomyopathy (HCC)  Dulaglutide (TRULICITY) 4.25 BX/9.5AI SOPN    potassium chloride (KLOR-CON M) 20 MEQ extended release tablet    Comprehensive Metabolic Panel    Hemoglobin A1C    External Referral To Cardiology   2.  Non-ischemic cardiomyopathy ([facility] [address] clarify with the pharmacy regarding trulicity/Check D7B and CMP/electrolytes next week. Encourage use of CPAP as much as possible  Referral to cardiology/[facility] -given dilated non ischemic cardiomyopathy - now with Volume overload related symptoms. Orders Placed This Encounter   Procedures    Comprehensive Metabolic Panel     Standing Status:   Future     Standing Expiration Date:   7/13/2022    Hemoglobin A1C     Standing Status:   Future     Standing Expiration Date:   7/13/2022    External Referral To Cardiology     Referral Priority:   Routine     Referral Type:   Eval and Treat     Referral Reason:   Specialty Services Required     Requested Specialty:   Cardiology     Number of Visits Requested:   1      No follow-ups on file. Patient given educational materials - see patient instructions. Discussed use, benefit, and side effects of prescribed medications. All patient questions answered. Pt voiced understanding. Reviewed health maintenance.        Electronically signed Jacque Alexis MD on 7/13/2021 at 10:04 AM EDT

## 2021-07-22 ASSESSMENT — ENCOUNTER SYMPTOMS
ABDOMINAL PAIN: 0
BLOOD IN STOOL: 0
SHORTNESS OF BREATH: 0
SORE THROAT: 0
COUGH: 0
TROUBLE SWALLOWING: 0
SINUS PAIN: 0
DIARRHEA: 0
RHINORRHEA: 0
CHEST TIGHTNESS: 0

## 2021-08-23 ENCOUNTER — NURSE ONLY (OUTPATIENT)
Dept: FAMILY MEDICINE CLINIC | Age: 46
End: 2021-08-23
Payer: COMMERCIAL

## 2021-08-23 ENCOUNTER — HOSPITAL ENCOUNTER (OUTPATIENT)
Age: 46
Setting detail: SPECIMEN
Discharge: HOME | End: 2021-08-23
Payer: COMMERCIAL

## 2021-08-23 DIAGNOSIS — Z11.52 ENCOUNTER FOR SCREENING FOR COVID-19: Primary | ICD-10-CM

## 2021-08-23 DIAGNOSIS — E11.9 TYPE 2 DIABETES MELLITUS WITHOUT COMPLICATION, WITHOUT LONG-TERM CURRENT USE OF INSULIN (HCC): ICD-10-CM

## 2021-08-23 DIAGNOSIS — R43.0 LOSS OF SMELL: ICD-10-CM

## 2021-08-23 DIAGNOSIS — R43.0 LOSS OF SMELL: Primary | ICD-10-CM

## 2021-08-23 DIAGNOSIS — R05.9 COUGH: ICD-10-CM

## 2021-08-23 DIAGNOSIS — R43.2 LOSS OF TASTE: ICD-10-CM

## 2021-08-23 PROCEDURE — U0003 INFECTIOUS AGENT DETECTION BY NUCLEIC ACID (DNA OR RNA); SEVERE ACUTE RESPIRATORY SYNDROME CORONAVIRUS 2 (SARS-COV-2) (CORONAVIRUS DISEASE [COVID-19]), AMPLIFIED PROBE TECHNIQUE, MAKING USE OF HIGH THROUGHPUT TECHNOLOGIES AS DESCRIBED BY CMS-2020-01-R: HCPCS

## 2021-08-23 PROCEDURE — U0005 INFEC AGEN DETEC AMPLI PROBE: HCPCS

## 2021-08-23 RX ORDER — MONTELUKAST SODIUM 10 MG/1
TABLET ORAL
Qty: 30 TABLET | Refills: 2 | Status: SHIPPED | OUTPATIENT
Start: 2021-08-23 | End: 2021-11-05 | Stop reason: ALTCHOICE

## 2021-08-23 RX ORDER — METFORMIN HYDROCHLORIDE 500 MG/1
TABLET, EXTENDED RELEASE ORAL
Qty: 120 TABLET | Refills: 2 | Status: SHIPPED | OUTPATIENT
Start: 2021-08-23

## 2021-08-23 NOTE — TELEPHONE ENCOUNTER
Maribell Patterson is requesting a refill on the following medication(s):  Requested Prescriptions     Pending Prescriptions Disp Refills    metFORMIN (GLUCOPHAGE-XR) 500 MG extended release tablet [Pharmacy Med Name: METFORMIN HCL  MG TABLET] 120 tablet 2     Sig: take 2 tablets by mouth twice a day    montelukast (SINGULAIR) 10 MG tablet [Pharmacy Med Name: MONTELUKAST SOD 10 MG TABLET] 30 tablet 2     Sig: take 1 tablet by mouth at bedtime       Last Visit Date (If Applicable):  3/37/7458    Next Visit Date:    Visit date not found

## 2021-08-25 LAB
SARS-COV-2: ABNORMAL
SARS-COV-2: DETECTED
SOURCE: ABNORMAL

## 2021-08-27 ENCOUNTER — TELEPHONE (OUTPATIENT)
Dept: FAMILY MEDICINE CLINIC | Age: 46
End: 2021-08-27

## 2021-08-27 DIAGNOSIS — R43.2 LOSS OF TASTE: ICD-10-CM

## 2021-08-27 DIAGNOSIS — R05.9 COUGH: ICD-10-CM

## 2021-08-27 DIAGNOSIS — Z11.52 ENCOUNTER FOR SCREENING FOR COVID-19: Primary | ICD-10-CM

## 2021-08-27 DIAGNOSIS — U07.1 ACUTE BRONCHITIS DUE TO COVID-19 VIRUS: ICD-10-CM

## 2021-08-27 DIAGNOSIS — R43.0 LOSS OF SMELL: ICD-10-CM

## 2021-08-27 DIAGNOSIS — J20.8 ACUTE BRONCHITIS DUE TO COVID-19 VIRUS: ICD-10-CM

## 2021-08-27 RX ORDER — BENZONATATE 200 MG/1
200 CAPSULE ORAL 3 TIMES DAILY PRN
Qty: 30 CAPSULE | Refills: 0 | Status: SHIPPED | OUTPATIENT
Start: 2021-08-27 | End: 2021-09-03

## 2021-08-27 RX ORDER — AZITHROMYCIN 250 MG/1
250 TABLET, FILM COATED ORAL SEE ADMIN INSTRUCTIONS
Qty: 6 TABLET | Refills: 0 | Status: SHIPPED | OUTPATIENT
Start: 2021-08-27 | End: 2021-08-27

## 2021-08-27 RX ORDER — GUAIFENESIN 100 MG/5ML
10 SYRUP ORAL
Qty: 1 BOTTLE | Refills: 0 | Status: SHIPPED | OUTPATIENT
Start: 2021-08-27 | End: 2021-11-05 | Stop reason: ALTCHOICE

## 2021-08-27 NOTE — TELEPHONE ENCOUNTER
Placed Tessalon pearls as well cough syrup he could try. cough relief such as throat lozenges, hot tea, honey, and/or smoking cessation or avoidance of secondhand smoke is a reasonable first step.

## 2021-09-25 DIAGNOSIS — R06.02 EXERTIONAL SHORTNESS OF BREATH: ICD-10-CM

## 2021-09-25 DIAGNOSIS — R05.9 COUGH: ICD-10-CM

## 2021-09-25 DIAGNOSIS — Z99.89 OSA ON CPAP: ICD-10-CM

## 2021-09-25 DIAGNOSIS — I50.23 ACUTE ON CHRONIC SYSTOLIC CONGESTIVE HEART FAILURE (HCC): ICD-10-CM

## 2021-09-25 DIAGNOSIS — I50.22 CHRONIC SYSTOLIC CONGESTIVE HEART FAILURE (HCC): ICD-10-CM

## 2021-09-25 DIAGNOSIS — R60.0 BILATERAL LEG EDEMA: ICD-10-CM

## 2021-09-25 DIAGNOSIS — Z87.891 FORMER SMOKER: ICD-10-CM

## 2021-09-25 DIAGNOSIS — R06.00 PND (PAROXYSMAL NOCTURNAL DYSPNEA): ICD-10-CM

## 2021-09-25 DIAGNOSIS — R06.01 ORTHOPNEA: ICD-10-CM

## 2021-09-25 DIAGNOSIS — G47.33 OSA ON CPAP: ICD-10-CM

## 2021-09-25 DIAGNOSIS — R05.9 COUGHING: ICD-10-CM

## 2021-09-27 RX ORDER — FUROSEMIDE 40 MG/1
TABLET ORAL
Qty: 90 TABLET | Refills: 0 | Status: SHIPPED | OUTPATIENT
Start: 2021-09-27 | End: 2022-01-26 | Stop reason: SDUPTHER

## 2021-09-27 NOTE — TELEPHONE ENCOUNTER
Shon Malone is requesting a refill on the following medication(s):  Requested Prescriptions     Pending Prescriptions Disp Refills    furosemide (LASIX) 40 MG tablet [Pharmacy Med Name: FUROSEMIDE 40 MG TABLET] 90 tablet 0     Sig: take 1 tablet by mouth once daily       Last Visit Date (If Applicable):  1247    Next Visit Date:    Visit date not found

## 2021-11-05 ENCOUNTER — OFFICE VISIT (OUTPATIENT)
Dept: FAMILY MEDICINE CLINIC | Age: 46
End: 2021-11-05
Payer: COMMERCIAL

## 2021-11-05 ENCOUNTER — TELEPHONE (OUTPATIENT)
Dept: FAMILY MEDICINE CLINIC | Age: 46
End: 2021-11-05

## 2021-11-05 VITALS
DIASTOLIC BLOOD PRESSURE: 68 MMHG | SYSTOLIC BLOOD PRESSURE: 100 MMHG | TEMPERATURE: 98.4 F | WEIGHT: 315 LBS | HEIGHT: 71 IN | BODY MASS INDEX: 44.1 KG/M2 | OXYGEN SATURATION: 96 % | HEART RATE: 119 BPM

## 2021-11-05 DIAGNOSIS — M79.89 LEG SWELLING: ICD-10-CM

## 2021-11-05 DIAGNOSIS — I50.9 CONGESTIVE HEART FAILURE, UNSPECIFIED HF CHRONICITY, UNSPECIFIED HEART FAILURE TYPE (HCC): Primary | ICD-10-CM

## 2021-11-05 DIAGNOSIS — R53.83 FATIGUE, UNSPECIFIED TYPE: ICD-10-CM

## 2021-11-05 PROCEDURE — 99212 OFFICE O/P EST SF 10 MIN: CPT | Performed by: NURSE PRACTITIONER

## 2021-11-05 RX ORDER — SACUBITRIL AND VALSARTAN 24; 26 MG/1; MG/1
1 TABLET, FILM COATED ORAL 2 TIMES DAILY
COMMUNITY
Start: 2021-08-02 | End: 2022-01-26

## 2021-11-05 NOTE — PROGRESS NOTES
[facility] [address] [address] [facility], APRN-CNP  [address]  Phone:  [phone]  Fax:  [phone]  Neli Clements is a 40 y.o. male who presents today for his medical conditions/complaints as noted below. Neli Clements c/o of Leg Swelling (legs swelling more than usual, skin feels tight, and dry flaky skin. Noticed it getting worse 3 weeks ago, and hasn't gotten better. Started Entresto 2 months ago, thinks it may be a side effect. )      HPI:     HPI    Wt Readings from Last 3 Encounters:   11/05/21 (!) 326 lb (147.9 kg)   07/13/21 (!) 336 lb (152.4 kg)   05/18/21 (!) 325 lb (147.4 kg)       Temp Readings from Last 3 Encounters:   11/05/21 98.4 °F (36.9 °C)   01/13/21 99.1 °F (37.3 °C)   11/22/17 97.7 °F (36.5 °C) (Oral)       BP Readings from Last 3 Encounters:   11/05/21 100/68   07/13/21 114/68   05/18/21 112/68       Pulse Readings from Last 3 Encounters:   11/05/21 119   07/13/21 107   05/18/21 109        SpO2 Readings from Last 3 Encounters:   11/05/21 96%   07/13/21 95%   05/18/21 97%             Past Medical History:   Diagnosis Date    Abnormal stress test 10/30/2017    CHF (congestive heart failure) ([facility] [address] 2/8/2018    Depression with anxiety     pt having hard time since quitting smoking March 2013.  Glucose intolerance (impaired glucose tolerance)     Insomnia     Works 3rd shift.  Morbid obesity (HCC)     Sinusitis, chronic     Tobacco abuse     Unspecified sleep apnea     Suspected. Pt declines sleep study at this time.     Varicose vein     left leg,thigh region    Venous insufficiency       Past Surgical History:   Procedure Laterality Date    CARDIAC CATHETERIZATION  11/22/2017     Family History   Problem Relation Age of Onset    Other Mother         posttraumatic stress disorder,battered wife syndrome    Cervical Cancer Mother     Other Father         Hepatitis C    Diabetes Maternal Grandfather     Other Daughter         adhd     Social History     Tobacco Use    Smoking status: Current Every Day Smoker     Packs/day: 1.00     Years: 19.00     Pack years: 19.00     Types: Cigarettes    Smokeless tobacco: Never Used    Tobacco comment: Quit in March 2013   Substance Use Topics    Alcohol use: Yes     Comment: Rare      Current Outpatient Medications   Medication Sig Dispense Refill    sacubitril-valsartan (ENTRESTO) 24-26 MG per tablet Take 1 tablet by mouth 2 times daily      furosemide (LASIX) 40 MG tablet take 1 tablet by mouth once daily 90 tablet 0    metFORMIN (GLUCOPHAGE-XR) 500 MG extended release tablet take 2 tablets by mouth twice a day 120 tablet 2    Dulaglutide (TRULICITY) 3.02 DZ/8.5ZB SOPN Inject 0.75 mg into the skin once a week 5 pen 2    potassium chloride (KLOR-CON M) 20 MEQ extended release tablet Take 1 tablet by mouth daily 60 tablet 1    cetirizine (ZYRTEC ALLERGY) 10 MG tablet Take 1 tablet by mouth daily 30 tablet 5    carvedilol (COREG) 3.125 MG tablet TAKE 1 TABLET TWICE A DAY WITH MEALS 180 tablet 3    spironolactone (ALDACTONE) 25 MG tablet Take 1 tablet by mouth daily 90 tablet 3    CPAP Machine MISC by Does not apply route 1 each 1    blood glucose monitor kit and supplies Test 2 times a day & as needed for symptoms of irregular blood glucose. 1 kit 0    blood glucose monitor strips Use 2 times daily to test sugars 100 strip 5    Lancets MISC Use 2 times daily 100 each 5    aspirin 81 MG tablet Take 81 mg by mouth daily Patient now has 81 mg aspirin      Acetaminophen (TYLENOL PO) Take  by mouth as needed.  atorvastatin (LIPITOR) 20 MG tablet take 1 tablet by mouth once daily (Patient not taking: Reported on 7/13/2021) 90 tablet 3     No current facility-administered medications for this visit. No Known Allergies    No exam data present    Subjective:      Review of Systems  Weight loss since COVID. Legs have been swelling for 2 to 3 weeks.   Had [place] in August. Started Entresto around the same time. No increase in shortness of breath. Actually a bit better. Objective:     /68 (Site: Right Upper Arm, Position: Sitting, Cuff Size: Medium Adult)   Pulse 119   Temp 98.4 °F (36.9 °C)   Ht 5' 11\" (1.803 m)   Wt (!) 326 lb (147.9 kg)   SpO2 96%   BMI 45.47 kg/m²     Physical Exam  Constitutional:       General: He is not in acute distress. Appearance: He is well-developed. He is obese. He is not ill-appearing, toxic-appearing or diaphoretic. HENT:      Head: Normocephalic. Right Ear: Tympanic membrane, ear canal and external ear normal.      Left Ear: Tympanic membrane, ear canal and external ear normal.      Nose: Nose normal. No mucosal edema, congestion or rhinorrhea. Mouth/Throat:      Mouth: Mucous membranes are moist. Mucous membranes are not pale and not dry. Pharynx: Oropharynx is clear. Eyes:      General: Lids are normal. No scleral icterus. Right eye: No discharge. Left eye: No discharge. Extraocular Movements:      Right eye: No nystagmus. Left eye: No nystagmus. Conjunctiva/sclera: Conjunctivae normal.   Neck:      Trachea: Trachea normal.   Cardiovascular:      Rate and Rhythm: Normal rate and regular rhythm. Pulses: Normal pulses. Heart sounds: Normal heart sounds. Pulmonary:      Effort: Pulmonary effort is normal. No accessory muscle usage or respiratory distress. Breath sounds: Normal breath sounds. Musculoskeletal:         General: Normal range of motion. Cervical back: Full passive range of motion without pain and normal range of motion. Right lower le+ Pitting Edema present. Left lower le+ Pitting Edema present. Skin:     General: Skin is warm and dry. Capillary Refill: Capillary refill takes less than 2 seconds. Coloration: Skin is not pale. Neurological:      Mental Status: He is alert and oriented to person, place, and time.    Psychiatric:         Mood and Affect: Mood normal.         Speech: Speech normal.         Behavior: Behavior normal.         Thought Content: Thought content normal.         Judgment: Judgment normal.         Assessment:      Diagnosis Orders   1. Congestive heart failure, unspecified HF chronicity, unspecified heart failure type (HCC)  Echocardiogram complete   2. Leg swelling  Echocardiogram complete   3. Fatigue, unspecified type  Echocardiogram complete     No results found for this visit on 11/05/21. Plan:       Complete echocardiogram.  Increase lasix dosage to 40 mg am and 20 mg 5 hours later for 5 days. We will call you with the echo results and plan of care at that time. Follow up with Dr. Nikole Sow next week. There are no Patient Instructions on file for this visit. Patient/Caregiver instructed on use, benefit, and side effects of prescribed medications. All patient/parent/caregiver questions answered. Patient/parent/caregiver voiced understanding. Reviewed health maintenance. Instructed to continue current medications, diet and exercise. Patient agreed with treatment plan. Follow up as directed.            Electronically signed by KLAUDIA Nguyen NP on11/5/2021

## 2021-11-05 NOTE — LETTER
..          [address] [facility]  [address] [address]  Phone: [phone]  Fax: [phone]    KLAUDIA Sparks NP        November 5, 2021    Patient: Alonzo Rincon   Date of Birth: [birth year]   Date of Visit: 11/5/21       To Whom it May Concern:    Jacqui Pancho was seen in my clinic on 11/5/21. Please excuse him from work on 11/5/2021. If you have any questions or concerns, please don't hesitate to call.     Sincerely,         KLAUDIA Sparks NP

## 2021-11-05 NOTE — TELEPHONE ENCOUNTER
Please call patient. Tell him to increase his lasix to 40 mg in the am followed by 20 mg 5 hours later. Do this for 5 days. Complete Echo and schedule appointment for next week with Dr. Felton Rosario.

## 2021-12-08 ENCOUNTER — OFFICE VISIT (OUTPATIENT)
Dept: FAMILY MEDICINE CLINIC | Age: 46
End: 2021-12-08
Payer: COMMERCIAL

## 2021-12-08 VITALS
OXYGEN SATURATION: 95 % | WEIGHT: 315 LBS | DIASTOLIC BLOOD PRESSURE: 68 MMHG | SYSTOLIC BLOOD PRESSURE: 116 MMHG | HEART RATE: 107 BPM | BODY MASS INDEX: 46.86 KG/M2

## 2021-12-08 DIAGNOSIS — Z72.0 TOBACCO ABUSE: ICD-10-CM

## 2021-12-08 DIAGNOSIS — R60.0 BILATERAL LEG EDEMA: Primary | ICD-10-CM

## 2021-12-08 DIAGNOSIS — I50.22 CHRONIC SYSTOLIC CONGESTIVE HEART FAILURE (HCC): ICD-10-CM

## 2021-12-08 DIAGNOSIS — E11.69 TYPE 2 DIABETES MELLITUS WITH OTHER SPECIFIED COMPLICATION, WITHOUT LONG-TERM CURRENT USE OF INSULIN (HCC): ICD-10-CM

## 2021-12-08 DIAGNOSIS — E66.01 MORBID OBESITY (HCC): ICD-10-CM

## 2021-12-08 PROBLEM — E11.9 DIABETES MELLITUS, TYPE II (HCC): Status: ACTIVE | Noted: 2021-12-08

## 2021-12-08 PROCEDURE — 99214 OFFICE O/P EST MOD 30 MIN: CPT | Performed by: INTERNAL MEDICINE

## 2021-12-08 PROCEDURE — 3052F HG A1C>EQUAL 8.0%<EQUAL 9.0%: CPT | Performed by: INTERNAL MEDICINE

## 2021-12-08 ASSESSMENT — ENCOUNTER SYMPTOMS
ABDOMINAL PAIN: 0
DIARRHEA: 0
RHINORRHEA: 0
SHORTNESS OF BREATH: 0
TROUBLE SWALLOWING: 0
COUGH: 0
BLOOD IN STOOL: 0
SINUS PAIN: 0
SORE THROAT: 0
CHEST TIGHTNESS: 0

## 2021-12-08 NOTE — PROGRESS NOTES
[address] [address] [place] [address]  Dept: [phone]  Dept Fax: [phone]  Loc: [phone]     Visit Date:  12/8/2021    Patient:  Shelley Berman  YOB: 1975    HPI:   Shelley Berman presents today for   Chief Complaint   Patient presents with    Leg Swelling     patient is here today for leg swelling that is still getting worse since november. Rocio Maddox HPI 40year old male with a history of venous insufficiency, varicose vein, hypertension, morbid obesity BMI of 46, sleep apnea on CPAP, diabetes mellitus type 2 A1C 8.6, tobacco abuse 1ppd 25 years on/off, depression anxiety insomnia, systolic CHF nonischemic cardiomyopathy last echo showed ejection fraction of 30 to 35% is coming in for a worsening leg edema LLE>RLE. Endorses orthopnea/PND/leg edema related symptoms. Cant lay flat in bed and chronic SOB. Scheduled for another echo in around 17th. In August had COVID and since then still struggling with lost of sensation of taste and smell. Has not been eating much food lately and or salt intake due to lack of taste. No history of DVT/PE , no fever chills or systemic signs of infection. Need FMLA forms filled out. Swelling seems to be radiating down to ankles/foot/upto thighs. Noticed weight gain since last seen. Currently on lasix 40 mg, entresto, correg, aspirin, aldactone, potassium. Smoker- 1 ppd >40 years. Have not been using trulicity on consistent basis. Have not been taking lipitor. He reports\" they and or our office does not refill so he stopped taking it\". Medications  Prior to Visit Medications    Medication Sig Taking?  Authorizing Provider   sacubitril-valsartan (ENTRESTO) 24-26 MG per tablet Take 1 tablet by mouth 2 times daily Yes Historical Provider, MD   furosemide (LASIX) 40 MG tablet take 1 tablet by mouth once daily Yes Елена Jaylene Marinelli MD   metFORMIN (GLUCOPHAGE-XR) 500 MG extended release tablet take 2 tablets by mouth twice a day Yes Shahbaz Gupta MD   potassium chloride (KLOR-CON M) 20 MEQ extended release tablet Take 1 tablet by mouth daily Yes Shahbaz Gupta MD   cetirizine (ZYRTEC ALLERGY) 10 MG tablet Take 1 tablet by mouth daily Yes KLAUDIA Zhou CNP   carvedilol (COREG) 3.125 MG tablet TAKE 1 TABLET TWICE A DAY WITH MEALS Yes Biju Jovel DO   spironolactone (ALDACTONE) 25 MG tablet Take 1 tablet by mouth daily Yes Angel Wheat MD   blood glucose monitor kit and supplies Test 2 times a day & as needed for symptoms of irregular blood glucose. Yes KLAUDIA Zhou CNP   blood glucose monitor strips Use 2 times daily to test sugars Yes KLAUDIA Zhou CNP   Lancets MISC Use 2 times daily Yes KLAUDIA Zhou CNP   aspirin 81 MG tablet Take 81 mg by mouth daily Patient now has 81 mg aspirin Yes Historical Provider, MD   Acetaminophen (TYLENOL PO) Take  by mouth as needed. Yes Historical Provider, MD   Dulaglutide (TRULICITY) 0.76 JF/3.0DM SOPN Inject 0.75 mg into the skin once a week  Patient not taking: Reported on 12/8/2021  Shahbaz Gupta MD   atorvastatin (LIPITOR) 20 MG tablet take 1 tablet by mouth once daily  Patient not taking: Reported on 7/13/2021  Shahbaz Gupta MD   CPAP Machine MISC by Does not apply route  Patient not taking: Reported on 12/8/2021  Patrice Jovel DO        Allergies:  has No Known Allergies. Past Medical History:   has a past medical history of Abnormal stress test, CHF (congestive heart failure) ([address] Depression with anxiety, Glucose intolerance (impaired glucose tolerance), Insomnia, Morbid obesity ([address] Sinusitis, chronic, Tobacco abuse, Unspecified sleep apnea, Varicose vein, and Venous insufficiency. Past Surgical History   has a past surgical history that includes Cardiac catheterization (11/22/2017).     Family History  family history includes Cervical Cancer in his mother; Diabetes in his maternal grandfather; Other in his daughter, father, and mother. Social History   reports that he has been smoking cigarettes. He has a 19.00 pack-year smoking history. He has never used smokeless tobacco. He reports current alcohol use. He reports that he does not use drugs. Health Maintenance:    Health Maintenance   Topic Date Due    Hepatitis C screen  Never done    Pneumococcal 0-64 years Vaccine (1 of 2 - PPSV23) Never done    Diabetic foot exam  Never done    COVID-19 Vaccine (1) Never done    HIV screen  Never done    Hepatitis B vaccine (1 of 3 - Risk 3-dose series) Never done    DTaP/Tdap/Td vaccine (1 - Tdap) Never done    Diabetic microalbuminuria test  01/10/2019    Lipid screen  01/10/2019    Diabetic retinal exam  08/26/2020    Colon cancer screen colonoscopy  Never done    Flu vaccine (1) Never done    A1C test (Diabetic or Prediabetic)  03/01/2022    Potassium monitoring  05/18/2022    Creatinine monitoring  05/18/2022    Hepatitis A vaccine  Aged Out    Hib vaccine  Aged Out    Meningococcal (ACWY) vaccine  Aged Out       Subjective:      Review of Systems   Constitutional: Negative for fatigue, fever and unexpected weight change. HENT: Negative for ear pain, postnasal drip, rhinorrhea, sinus pain, sore throat and trouble swallowing. Eyes: Negative for visual disturbance. Respiratory: Negative for cough, chest tightness and shortness of breath. Cardiovascular: Negative for chest pain and leg swelling. Gastrointestinal: Negative for abdominal pain, blood in stool and diarrhea. Endocrine: Negative for polyuria. Genitourinary: Negative for difficulty urinating and flank pain. Musculoskeletal: Negative for arthralgias, joint swelling and myalgias. Skin: Negative for rash. Allergic/Immunologic: Negative for environmental allergies. Neurological: Negative for weakness, light-headedness, numbness and headaches. Hematological: Negative for adenopathy. Psychiatric/Behavioral: Negative for behavioral problems and suicidal ideas. The patient is not nervous/anxious. Objective:     /68 (Site: Right Upper Arm, Position: Sitting)   Pulse 107   Wt (!) 336 lb (152.4 kg)   SpO2 95%   BMI 46.86 kg/m²     Physical Exam  Vitals and nursing note reviewed. HENT:      Head: Normocephalic and atraumatic. Cardiovascular:      Rate and Rhythm: Regular rhythm. Tachycardia present. Pulmonary:      Effort: Pulmonary effort is normal.      Breath sounds: Normal breath sounds. No stridor. Abdominal:      General: Abdomen is flat. Musculoskeletal:      Right lower leg: Edema present. Left lower leg: Edema present. Legs:       Comments: Marked significant >3+ pitting edema with stasis dermatitis related skin changes  Not warm to touch. No open wounds or ulcers seen. +clear drainage. Skin:     General: Skin is warm. Neurological:      Mental Status: He is oriented to person, place, and time. Mental status is at baseline. Psychiatric:         Mood and Affect: Mood normal.             Assessment       Diagnosis Orders   1. Bilateral leg edema  Comprehensive Metabolic Panel    Hemoglobin A1C   2. Chronic systolic congestive heart failure (HCC)  Comprehensive Metabolic Panel    Hemoglobin A1C   3. Morbid obesity (HCC)  Comprehensive Metabolic Panel    Hemoglobin A1C   4. Tobacco abuse  Comprehensive Metabolic Panel    Hemoglobin A1C   5. Type 2 diabetes mellitus with other specified complication, without long-term current use of insulin (HCC)           PLAN   Awaiting Echo/need cardiology input on better management of his hypervolemia/leg edema/ongoing decompensated CHF. Do the best with judicious use of salt/water/compliance with medications/leg elevation/skin care/compression hoses. Check electrolytes as well A1C before refills on potassium tabs and or diabetic meds.   fmla forms filled

## 2021-12-13 ENCOUNTER — TELEPHONE (OUTPATIENT)
Dept: FAMILY MEDICINE CLINIC | Age: 46
End: 2021-12-13

## 2021-12-13 NOTE — TELEPHONE ENCOUNTER
PT called and stated he had [facility] paperwork filled out on 12/08/2021. PT stated FMLA said he was missing a page to the paperwork and PT needs this filled out before the December 22nd. Is there any way someone can fill out his paperwork for him?

## 2021-12-17 LAB
ALBUMIN/GLOBULIN RATIO: 1.3 G/DL
ALBUMIN: 3.8 G/DL (ref 3.5–5)
ALP BLD-CCNC: 96 UNITS/L (ref 38–126)
ALT SERPL-CCNC: 14 UNITS/L (ref 4–50)
ANION GAP SERPL CALCULATED.3IONS-SCNC: 10.9 MMOL/L
AST SERPL-CCNC: 25 UNITS/L (ref 17–59)
BILIRUB SERPL-MCNC: 1.2 MG/DL (ref 0.2–1.3)
BUN BLDV-MCNC: 20 MG/DL (ref 9–20)
CALCIUM SERPL-MCNC: 9.3 MG/DL (ref 8.4–10.2)
CHLORIDE BLD-SCNC: 103 MMOL/L (ref 98–120)
CO2: 24 MMOL/L (ref 22–31)
CREAT SERPL-MCNC: 0.7 MG/DL (ref 0.7–1.3)
GFR CALCULATED: > 60
GLOBULIN: 3 G/DL
GLUCOSE: 148 MG/DL (ref 75–110)
HBA1C MFR BLD: 7.3 % (ref 4.4–6.4)
POTASSIUM SERPL-SCNC: 4.9 MMOL/L (ref 3.6–5)
SODIUM BLD-SCNC: 133 MMOL/L (ref 135–145)
TOTAL PROTEIN, SERUM: 6.8 G/DL (ref 6.3–8.2)

## 2021-12-20 NOTE — RESULT ENCOUNTER NOTE
I spoke with patient regarding results. I told him he must see Dr. Sofia Norris soon as he is showing severe heart failure. I explained he has a high risk of cardiac death due to the heart failure. I offered him a work excuse, but he isn't sure he wants one. Can you please call Dr. Vaishnavi Delarosa group and ask them to contact patient for an urgent appointment? Make sure they know his EF has dropped from 35% (3 years ago ) to 20%, and that the echo was read by Dr. Sofia Norris. Let me know when this is done. Let me know when he is scheduled for.

## 2021-12-21 ENCOUNTER — TELEPHONE (OUTPATIENT)
Dept: FAMILY MEDICINE CLINIC | Age: 46
End: 2021-12-21

## 2021-12-22 ENCOUNTER — TELEPHONE (OUTPATIENT)
Dept: FAMILY MEDICINE CLINIC | Age: 46
End: 2021-12-22

## 2021-12-22 DIAGNOSIS — I50.22 CHRONIC SYSTOLIC CONGESTIVE HEART FAILURE (HCC): Primary | ICD-10-CM

## 2022-01-11 ENCOUNTER — TELEPHONE (OUTPATIENT)
Dept: FAMILY MEDICINE CLINIC | Age: 47
End: 2022-01-11

## 2022-01-11 NOTE — TELEPHONE ENCOUNTER
PT called and stated that [facility] sent [facility] paperwork to us and PT stated he needs us to fill it out and then he needs it back to fill out his part of the [facility] paperwork. Then please fax it. We do have the paperwork.

## 2022-01-19 ENCOUNTER — OFFICE VISIT (OUTPATIENT)
Dept: FAMILY MEDICINE CLINIC | Age: 47
End: 2022-01-19
Payer: COMMERCIAL

## 2022-01-19 VITALS
WEIGHT: 306.8 LBS | HEIGHT: 71 IN | SYSTOLIC BLOOD PRESSURE: 90 MMHG | BODY MASS INDEX: 42.95 KG/M2 | HEART RATE: 104 BPM | OXYGEN SATURATION: 95 % | DIASTOLIC BLOOD PRESSURE: 68 MMHG | TEMPERATURE: 97.9 F

## 2022-01-19 DIAGNOSIS — I50.23 ACUTE ON CHRONIC SYSTOLIC HF (HEART FAILURE) (HCC): ICD-10-CM

## 2022-01-19 DIAGNOSIS — E11.622 DIABETIC CALF ULCER (HCC): ICD-10-CM

## 2022-01-19 DIAGNOSIS — E66.01 MORBID OBESITY (HCC): ICD-10-CM

## 2022-01-19 DIAGNOSIS — R60.0 BILATERAL LEG EDEMA: ICD-10-CM

## 2022-01-19 DIAGNOSIS — I42.8 NON-ISCHEMIC CARDIOMYOPATHY (HCC): ICD-10-CM

## 2022-01-19 DIAGNOSIS — L97.209 DIABETIC CALF ULCER (HCC): ICD-10-CM

## 2022-01-19 DIAGNOSIS — E11.69 TYPE 2 DIABETES MELLITUS WITH OTHER SPECIFIED COMPLICATION, WITHOUT LONG-TERM CURRENT USE OF INSULIN (HCC): Primary | ICD-10-CM

## 2022-01-19 PROCEDURE — 99214 OFFICE O/P EST MOD 30 MIN: CPT | Performed by: INTERNAL MEDICINE

## 2022-01-19 SDOH — ECONOMIC STABILITY: FOOD INSECURITY: WITHIN THE PAST 12 MONTHS, YOU WORRIED THAT YOUR FOOD WOULD RUN OUT BEFORE YOU GOT MONEY TO BUY MORE.: NEVER TRUE

## 2022-01-19 SDOH — ECONOMIC STABILITY: FOOD INSECURITY: WITHIN THE PAST 12 MONTHS, THE FOOD YOU BOUGHT JUST DIDN'T LAST AND YOU DIDN'T HAVE MONEY TO GET MORE.: NEVER TRUE

## 2022-01-19 ASSESSMENT — PATIENT HEALTH QUESTIONNAIRE - PHQ9
6. FEELING BAD ABOUT YOURSELF - OR THAT YOU ARE A FAILURE OR HAVE LET YOURSELF OR YOUR FAMILY DOWN: 0
SUM OF ALL RESPONSES TO PHQ QUESTIONS 1-9: 5
7. TROUBLE CONCENTRATING ON THINGS, SUCH AS READING THE NEWSPAPER OR WATCHING TELEVISION: 0
3. TROUBLE FALLING OR STAYING ASLEEP: 1
SUM OF ALL RESPONSES TO PHQ QUESTIONS 1-9: 5
1. LITTLE INTEREST OR PLEASURE IN DOING THINGS: 0
4. FEELING TIRED OR HAVING LITTLE ENERGY: 2
SUM OF ALL RESPONSES TO PHQ QUESTIONS 1-9: 5
SUM OF ALL RESPONSES TO PHQ9 QUESTIONS 1 & 2: 0
SUM OF ALL RESPONSES TO PHQ QUESTIONS 1-9: 5
8. MOVING OR SPEAKING SO SLOWLY THAT OTHER PEOPLE COULD HAVE NOTICED. OR THE OPPOSITE, BEING SO FIGETY OR RESTLESS THAT YOU HAVE BEEN MOVING AROUND A LOT MORE THAN USUAL: 0
5. POOR APPETITE OR OVEREATING: 2
10. IF YOU CHECKED OFF ANY PROBLEMS, HOW DIFFICULT HAVE THESE PROBLEMS MADE IT FOR YOU TO DO YOUR WORK, TAKE CARE OF THINGS AT HOME, OR GET ALONG WITH OTHER PEOPLE: 1
2. FEELING DOWN, DEPRESSED OR HOPELESS: 0
9. THOUGHTS THAT YOU WOULD BE BETTER OFF DEAD, OR OF HURTING YOURSELF: 0

## 2022-01-19 ASSESSMENT — ENCOUNTER SYMPTOMS
SINUS PAIN: 0
BLOOD IN STOOL: 0
DIARRHEA: 0
TROUBLE SWALLOWING: 0
SHORTNESS OF BREATH: 1
RHINORRHEA: 0
COUGH: 0
ABDOMINAL PAIN: 0
CHEST TIGHTNESS: 0
SORE THROAT: 0

## 2022-01-19 ASSESSMENT — SOCIAL DETERMINANTS OF HEALTH (SDOH): HOW HARD IS IT FOR YOU TO PAY FOR THE VERY BASICS LIKE FOOD, HOUSING, MEDICAL CARE, AND HEATING?: NOT HARD AT ALL

## 2022-01-19 NOTE — PROGRESS NOTES
[address]  [address] [address] [place] [address]  Dept: [phone]  Dept Fax: [phone]  Loc: [phone]     Visit Date:  1/19/2022    Patient:  Lindsey Michaels  YOB: 1975    HPI:   Lindsey Michaels presents today for   Chief Complaint   Patient presents with    Leg Swelling     Better but not back to normal, compression socks he cant get on so has not used. Left leg still has sores that leak fluid. Would be interested in a wound center   . HPI 40year old male is coming in for a follow up. Lower extremity edema . Since COVID food does not taste good/lack of sensation, lost weight 20 lbs since last seen . Try to watch what he eats. Recent escalation of  diuretics therapy as well. Correg- makes him tired/more short of breath. They did increase his dose recently. Is taking Lasix 60 mg BID and increase dose of entresto and aldactone 25 mg. He does have lab order from [facility] to check electrolytes. For DM only on metformin. Worsening left edema >more than right lower extremity edema. Seepage from the left leg/ multiple skin ulcer limited to skin breakdown. Recent cardiology notes        Recent echo-12/21  CONCLUSIONS:     1.  Technically poor quality echo and hard to accurately assess LV function.     2.  Appears to be severe LV dysfunction with an EF of around 20% (very limited views).     3.  Severely dilated left ventricle.     4.  Severe biatrial enlargement.     5.  RV dilated.     6.  Trace TR with an estimated RV systolic pressure of 35 mmHg. ?                Medications      Prior to Visit Medications    Medication Sig Taking?  Authorizing Provider   sacubitril-valsartan (ENTRESTO) 24-26 MG per tablet Take 1 tablet by mouth 2 times daily Yes Historical Provider, MD   furosemide (LASIX) 40 MG tablet take 1 tablet by mouth once daily Yes Speedy Hill MD   metFORMIN (GLUCOPHAGE-XR) 500 MG extended release tablet take 2 tablets by mouth twice a day Yes Giancarlo Gerber MD   potassium chloride (KLOR-CON M) 20 MEQ extended release tablet Take 1 tablet by mouth daily  Patient taking differently: Take 20 mEq by mouth as needed  Yes Giancarlo Gerber MD   cetirizine (ZYRTEC ALLERGY) 10 MG tablet Take 1 tablet by mouth daily Yes KLAUDIA Chance - CNP   spironolactone (ALDACTONE) 25 MG tablet Take 1 tablet by mouth daily Yes Luis Armando Santana MD   blood glucose monitor kit and supplies Test 2 times a day & as needed for symptoms of irregular blood glucose. Yes Angie KLAUDIA Camarena - CNP   blood glucose monitor strips Use 2 times daily to test sugars Yes Angie Nicol APRN - CNP   Lancets MISC Use 2 times daily Yes Angie JOSE ENRIQUE CamarenaN - CNP   aspirin 81 MG tablet Take 81 mg by mouth daily Patient now has 81 mg aspirin Yes Historical Provider, MD   Acetaminophen (TYLENOL PO) Take  by mouth as needed. Yes Historical Provider, MD   carvedilol (COREG) 3.125 MG tablet TAKE 1 TABLET TWICE A DAY WITH MEALS  Patient not taking: Reported on 1/19/2022  Biju Jovel DO        Allergies:  has No Known Allergies. Past Medical History:   has a past medical history of Abnormal stress test, CHF (congestive heart failure) ([facility] [address] Depression with anxiety, Glucose intolerance (impaired glucose tolerance), Insomnia, Morbid obesity ([facility] [address] Sinusitis, chronic, Tobacco abuse, Unspecified sleep apnea, Varicose vein, and Venous insufficiency. Past Surgical History   has a past surgical history that includes Cardiac catheterization (11/22/2017). Family History  family history includes Cervical Cancer in his mother; Diabetes in his maternal grandfather; Other in his daughter, father, and mother. Social History   reports that he has been smoking cigarettes. He has a 19.00 pack-year smoking history. He has never used smokeless tobacco. He reports current alcohol use.  He reports that he does not use drugs.    Health Maintenance:    Health Maintenance   Topic Date Due    Hepatitis C screen  Never done    COVID-19 Vaccine (1) Never done    Pneumococcal 0-64 years Vaccine (1 of 2 - PPSV23) Never done    Diabetic foot exam  Never done    Depression Monitoring  Never done    HIV screen  Never done    Hepatitis B vaccine (1 of 3 - Risk 3-dose series) Never done    DTaP/Tdap/Td vaccine (1 - Tdap) Never done    Diabetic microalbuminuria test  01/10/2019    Lipid screen  01/10/2019    Diabetic retinal exam  08/26/2020    Colon cancer screen colonoscopy  Never done    Flu vaccine (1) Never done    A1C test (Diabetic or Prediabetic)  12/17/2022    Potassium monitoring  12/17/2022    Creatinine monitoring  12/17/2022    Hepatitis A vaccine  Aged Out    Hib vaccine  Aged Out    Meningococcal (ACWY) vaccine  Aged Out       Subjective:      Review of Systems   Constitutional: Negative for fatigue, fever and unexpected weight change. HENT: Negative for ear pain, postnasal drip, rhinorrhea, sinus pain, sore throat and trouble swallowing. Eyes: Negative for visual disturbance. Respiratory: Positive for shortness of breath. Negative for cough and chest tightness. Cardiovascular: Positive for leg swelling. Negative for chest pain. Gastrointestinal: Negative for abdominal pain, blood in stool and diarrhea. Endocrine: Negative for polyuria. Genitourinary: Negative for difficulty urinating and flank pain. Musculoskeletal: Negative for arthralgias, joint swelling and myalgias. Skin: Negative for rash. Allergic/Immunologic: Negative for environmental allergies. Neurological: Negative for weakness, light-headedness, numbness and headaches. Hematological: Negative for adenopathy. Psychiatric/Behavioral: Negative for behavioral problems and suicidal ideas. The patient is not nervous/anxious.         Objective:     BP 90/68 (Site: Right Upper Arm, Position: Sitting, Cuff Size: Medium Adult) Pulse 104   Temp 97.9 °F (36.6 °C) (Tympanic)   Ht 5' 11\" (1.803 m)   Wt (!) 306 lb 12.8 oz (139.2 kg)   SpO2 95%   BMI 42.79 kg/m²     Physical Exam  Vitals and nursing note reviewed. HENT:      Head: Normocephalic and atraumatic. Cardiovascular:      Rate and Rhythm: Normal rate and regular rhythm. Pulmonary:      Breath sounds: No stridor. Musculoskeletal:      Right lower leg: Edema present. Left lower leg: Tenderness present. Edema present. Legs:       Comments: Massive 3+pitting edema radiating upto thighs. Multiple skin wounds/ulcers noticed with yellow color drainage in posterior mid calf of the left lower extremity. Diffuse circumferential erythema noted but not warm to touch     Skin:     General: Skin is warm. Findings: Erythema present. Neurological:      Mental Status: He is oriented to person, place, and time. Mental status is at baseline. Psychiatric:         Mood and Affect: Mood normal.             Assessment       Diagnosis Orders   1. Type 2 diabetes mellitus with other specified complication, without long-term current use of insulin [facility])  External Referral To Wound Clinic    Comprehensive Metabolic Panel   2. Morbid obesity [facility])  External Referral To Wound Clinic    Comprehensive Metabolic Panel   3. Non-ischemic cardiomyopathy [facility])  External Referral To Wound Clinic    Comprehensive Metabolic Panel   4. Acute on chronic systolic HF (heart failure) [facility])  External Referral To Wound Clinic    Comprehensive Metabolic Panel   5. Bilateral leg edema     6. Diabetic calf ulcer ([address]           PLAN   Recommend seeing wound care specialist/unna boots/compressive therapy. URGENT wound care consult placed. Now I would hold off on starting antibiotics until we get full assessment from Wound care specialist.   Edema and diabetic control is of paramount importance. Discussed SGLT2 inhibitor. Willing to try it.  Discussed side effects including  Bone fractures/lower limb amputation/hyperkalemia/genitourinary complications/. Samples of Farxiga 5 mg once daily x 2 weeks provided. Will see him back in 1-2 week back after wound care consultation. Get electrolytes check sooner than later would be good idea since recent adjustment escalation of dosages. Orders Placed This Encounter   Procedures    Comprehensive Metabolic Panel     Standing Status:   Future     Standing Expiration Date:   1/19/2023    External Referral To Wound Clinic     Referral Priority:   Urgent     Referral Type:   Eval and Treat     Referral Reason:   Specialty Services Required     Requested Specialty:   Wound Care     Number of Visits Requested:   1        No follow-ups on file. Patient given educational materials - see patient instructions. Discussed use, benefit, and side effects of prescribed medications. All patient questions answered. Pt voiced understanding. Reviewed health maintenance.        Electronically signed Leo Shah MD on 1/19/2022 at 9:31 AM EST

## 2022-01-26 ENCOUNTER — OFFICE VISIT (OUTPATIENT)
Dept: FAMILY MEDICINE CLINIC | Age: 47
End: 2022-01-26
Payer: COMMERCIAL

## 2022-01-26 VITALS
TEMPERATURE: 97.8 F | DIASTOLIC BLOOD PRESSURE: 60 MMHG | OXYGEN SATURATION: 97 % | HEIGHT: 71 IN | WEIGHT: 313.2 LBS | SYSTOLIC BLOOD PRESSURE: 98 MMHG | HEART RATE: 101 BPM | BODY MASS INDEX: 43.85 KG/M2

## 2022-01-26 DIAGNOSIS — R06.02 SHORTNESS OF BREATH: ICD-10-CM

## 2022-01-26 DIAGNOSIS — I87.2 VENOUS STASIS ULCER OF OTHER PART OF LEFT LOWER LEG LIMITED TO BREAKDOWN OF SKIN WITHOUT VARICOSE VEINS (HCC): ICD-10-CM

## 2022-01-26 DIAGNOSIS — E11.69 TYPE 2 DIABETES MELLITUS WITH OTHER SPECIFIED COMPLICATION, WITHOUT LONG-TERM CURRENT USE OF INSULIN (HCC): ICD-10-CM

## 2022-01-26 DIAGNOSIS — E66.01 MORBID OBESITY (HCC): ICD-10-CM

## 2022-01-26 DIAGNOSIS — Z99.89 OSA ON CPAP: ICD-10-CM

## 2022-01-26 DIAGNOSIS — L97.821 VENOUS STASIS ULCER OF OTHER PART OF LEFT LOWER LEG LIMITED TO BREAKDOWN OF SKIN WITHOUT VARICOSE VEINS (HCC): ICD-10-CM

## 2022-01-26 DIAGNOSIS — R05.9 COUGHING: ICD-10-CM

## 2022-01-26 DIAGNOSIS — R05.9 COUGH: ICD-10-CM

## 2022-01-26 DIAGNOSIS — I50.20 SYSTOLIC CHF WITH REDUCED LEFT VENTRICULAR FUNCTION, NYHA CLASS 3 (HCC): Primary | ICD-10-CM

## 2022-01-26 DIAGNOSIS — I50.23 ACUTE ON CHRONIC SYSTOLIC CONGESTIVE HEART FAILURE (HCC): ICD-10-CM

## 2022-01-26 DIAGNOSIS — R06.02 EXERTIONAL SHORTNESS OF BREATH: ICD-10-CM

## 2022-01-26 DIAGNOSIS — R06.00 PND (PAROXYSMAL NOCTURNAL DYSPNEA): ICD-10-CM

## 2022-01-26 DIAGNOSIS — R60.0 BILATERAL LOWER EXTREMITY EDEMA: ICD-10-CM

## 2022-01-26 DIAGNOSIS — G47.33 OSA ON CPAP: ICD-10-CM

## 2022-01-26 DIAGNOSIS — I50.22 CHRONIC SYSTOLIC CONGESTIVE HEART FAILURE (HCC): ICD-10-CM

## 2022-01-26 DIAGNOSIS — E11.622 DIABETIC ULCER OF LOWER EXTREMITY (HCC): ICD-10-CM

## 2022-01-26 DIAGNOSIS — Z72.0 TOBACCO ABUSE: ICD-10-CM

## 2022-01-26 DIAGNOSIS — R06.01 ORTHOPNEA: ICD-10-CM

## 2022-01-26 DIAGNOSIS — R60.0 BILATERAL LEG EDEMA: ICD-10-CM

## 2022-01-26 DIAGNOSIS — Z87.891 FORMER SMOKER: ICD-10-CM

## 2022-01-26 DIAGNOSIS — L97.909 DIABETIC ULCER OF LOWER EXTREMITY (HCC): ICD-10-CM

## 2022-01-26 PROCEDURE — 99214 OFFICE O/P EST MOD 30 MIN: CPT | Performed by: INTERNAL MEDICINE

## 2022-01-26 RX ORDER — AMOXICILLIN AND CLAVULANATE POTASSIUM 875; 125 MG/1; MG/1
TABLET, FILM COATED ORAL
COMMUNITY
Start: 2022-01-21 | End: 2022-03-29

## 2022-01-26 RX ORDER — SACUBITRIL AND VALSARTAN 49; 51 MG/1; MG/1
TABLET, FILM COATED ORAL
COMMUNITY
Start: 2022-01-12

## 2022-01-26 RX ORDER — FUROSEMIDE 40 MG/1
TABLET ORAL
Qty: 90 TABLET | Refills: 0
Start: 2022-01-26 | End: 2022-07-07

## 2022-01-26 SDOH — ECONOMIC STABILITY: FOOD INSECURITY: WITHIN THE PAST 12 MONTHS, YOU WORRIED THAT YOUR FOOD WOULD RUN OUT BEFORE YOU GOT MONEY TO BUY MORE.: NEVER TRUE

## 2022-01-26 ASSESSMENT — ENCOUNTER SYMPTOMS
SHORTNESS OF BREATH: 1
BLOOD IN STOOL: 0
ABDOMINAL PAIN: 0
RHINORRHEA: 0
COUGH: 0
SORE THROAT: 0
DIARRHEA: 0
TROUBLE SWALLOWING: 0
CHEST TIGHTNESS: 0
SINUS PAIN: 0
COLOR CHANGE: 1

## 2022-01-26 ASSESSMENT — PATIENT HEALTH QUESTIONNAIRE - PHQ9
2. FEELING DOWN, DEPRESSED OR HOPELESS: 0
SUM OF ALL RESPONSES TO PHQ QUESTIONS 1-9: 0
1. LITTLE INTEREST OR PLEASURE IN DOING THINGS: 0
SUM OF ALL RESPONSES TO PHQ9 QUESTIONS 1 & 2: 0
SUM OF ALL RESPONSES TO PHQ QUESTIONS 1-9: 0

## 2022-01-26 ASSESSMENT — SOCIAL DETERMINANTS OF HEALTH (SDOH): HOW HARD IS IT FOR YOU TO PAY FOR THE VERY BASICS LIKE FOOD, HOUSING, MEDICAL CARE, AND HEATING?: NOT HARD AT ALL

## 2022-01-26 NOTE — PROGRESS NOTES
[address] [address] [address] [address] [address]  Dept: [phone]  Dept Fax: [phone]  Loc: [phone]     Visit Date:  1/26/2022    Patient:  Erick Yuen  YOB: 1975    HPI:   Erick Yuen presents today for   Chief Complaint   Patient presents with    Other     1 week f/u Diabetes, Edema- open wound left leg. Leg still swollen and sore. He has seen Wound Care. He had to remove wrap because it had fallen down into work boot causing numbness. He has f/u tomorrow with Wound Care Physician. Roya Miranda HPI 56year old male is coming in for 1 week follow up. Infected Diabetic leg ulcers/venous stasis ulcers in left lower extremity complicated with edema/- recently saw Wound care. - seeing them tomorrow, started on augmentin. tried compression wraps was causing toes to go numb so he had to cut it. Compression therapy was shifting fluids upto thigh. Recently seen by the cardiology and medications including lasix and entresto were escalated. Lasix 60 mg BID/aldactone,correg, entresto   need to get CMP done. .  Not taking potassium supplements. Tried taking correg-but not taking it-- huffing and puffing/have to take deep breath to catch his breath with use of Beta blocker so not taking i      10 LBS WEIGHT gain- within a weeks    Farxiga tolerating it well no side effects    Recent cards visit notes-        Medications  Prior to Visit Medications    Medication Sig Taking?  Authorizing Provider   ENTRESTO 49-51 MG per tablet take 1 tablet by mouth twice a day Yes Historical Provider, MD   dapagliflozin (FARXIGA) 5 MG tablet Take 5 mg by mouth every morning Yes Historical Provider, MD   furosemide (LASIX) 40 MG tablet take 1 tablet by mouth once daily Yes Parag Ambriz MD   metFORMIN (GLUCOPHAGE-XR) 500 MG extended release tablet take 2 tablets by mouth twice a day Yes Parag Ambriz MD cetirizine (ZYRTEC ALLERGY) 10 MG tablet Take 1 tablet by mouth daily Yes KLAUDIA Mon CNP   spironolactone (ALDACTONE) 25 MG tablet Take 1 tablet by mouth daily Yes Michele Acevedo MD   aspirin 81 MG tablet Take 81 mg by mouth daily Patient now has 81 mg aspirin Yes Historical Provider, MD   Acetaminophen (TYLENOL PO) Take  by mouth as needed. Yes Historical Provider, MD   amoxicillin-clavulanate (AUGMENTIN) 875-125 MG per tablet   Historical Provider, MD   potassium chloride (KLOR-CON M) 20 MEQ extended release tablet Take 1 tablet by mouth daily  Patient not taking: Reported on 1/26/2022  Elyse Juarez MD   blood glucose monitor kit and supplies Test 2 times a day & as needed for symptoms of irregular blood glucose. KLAUDIA Mon CNP   blood glucose monitor strips Use 2 times daily to test sugars  KLAUDIA Mon CNP   Lancets MISC Use 2 times daily  KLAUDIA Mon CNP        Allergies:  has No Known Allergies. Past Medical History:   has a past medical history of Abnormal stress test, CHF (congestive heart failure) ([address] Depression with anxiety, Glucose intolerance (impaired glucose tolerance), Insomnia, Morbid obesity ([address] Sinusitis, chronic, Tobacco abuse, Unspecified sleep apnea, Varicose vein, and Venous insufficiency. Past Surgical History   has a past surgical history that includes Cardiac catheterization (11/22/2017). Family History  family history includes Cervical Cancer in his mother; Diabetes in his maternal grandfather; Other in his daughter, father, and mother. Social History   reports that he has been smoking cigarettes. He has a 19.00 pack-year smoking history. He has never used smokeless tobacco. He reports current alcohol use. He reports that he does not use drugs.     Health Maintenance:    Health Maintenance   Topic Date Due    Hepatitis C screen  Never done    COVID-19 Vaccine (1) Never done    Pneumococcal 0-64 years Vaccine (1 of 2 - PPSV23) Never done    Diabetic foot exam  Never done    HIV screen  Never done    Hepatitis B vaccine (1 of 3 - Risk 3-dose series) Never done    DTaP/Tdap/Td vaccine (1 - Tdap) Never done    Diabetic microalbuminuria test  01/10/2019    Lipid screen  01/10/2019    Diabetic retinal exam  08/26/2020    Colon cancer screen colonoscopy  Never done    Flu vaccine (1) Never done    A1C test (Diabetic or Prediabetic)  12/17/2022    Potassium monitoring  12/17/2022    Creatinine monitoring  12/17/2022    Depression Monitoring  01/26/2023    Hepatitis A vaccine  Aged Out    Hib vaccine  Aged Out    Meningococcal (ACWY) vaccine  Aged Out       Subjective:      Review of Systems   Constitutional: Negative for fatigue, fever and unexpected weight change. HENT: Negative for ear pain, postnasal drip, rhinorrhea, sinus pain, sore throat and trouble swallowing. Eyes: Negative for visual disturbance. Respiratory: Positive for shortness of breath. Negative for cough and chest tightness. Cardiovascular: Positive for leg swelling. Negative for chest pain. Gastrointestinal: Negative for abdominal pain, blood in stool and diarrhea. Endocrine: Negative for polyuria. Genitourinary: Negative for difficulty urinating and flank pain. Musculoskeletal: Negative for arthralgias, joint swelling and myalgias. Skin: Positive for color change. Negative for rash. Allergic/Immunologic: Negative for environmental allergies. Neurological: Negative for weakness, light-headedness, numbness and headaches. Hematological: Negative for adenopathy. Psychiatric/Behavioral: Negative for behavioral problems and suicidal ideas. The patient is not nervous/anxious.         Objective:     BP 98/60 (Site: Right Upper Arm, Position: Sitting, Cuff Size: Large Adult)   Pulse 101   Temp 97.8 °F (36.6 °C)   Ht 5' 11.2\" (1.808 m)   Wt (!) 313 lb 3.2 oz (142.1 kg)   SpO2 97%   BMI 43.44 kg/m²     Physical Exam  Vitals and nursing note reviewed. HENT:      Head: Normocephalic and atraumatic. Cardiovascular:      Rate and Rhythm: Normal rate and regular rhythm. Pulses: Normal pulses. Heart sounds: Normal heart sounds. No murmur heard. No friction rub. No gallop. Pulmonary:      Effort: Pulmonary effort is normal.      Breath sounds: No stridor. Examination of the right-middle field reveals rales. Examination of the left-middle field reveals rales. Rales present. Musculoskeletal:         General: Tenderness present. Skin:     General: Skin is warm. Findings: Erythema, lesion and rash present. Comments: 2-3+pitting edema noted  Skin tensing/diabetic ulcer noticed mid calf of the LLE . Incomplete visualization of the LLE due to massive edema/tight fitted pants but does report multiple other skin ulcers. Neurological:      Mental Status: He is oriented to person, place, and time. Mental status is at baseline. Psychiatric:         Mood and Affect: Mood normal.             Assessment       Diagnosis Orders   1. Systolic CHF with reduced left ventricular function, NYHA class 3 ([address]     2. Type 2 diabetes mellitus with other specified complication, without long-term current use of insulin ([address]     3. Tobacco abuse     4. Morbid obesity ([address]     5. Shortness of breath     6. Bilateral lower extremity edema     7. Diabetic ulcer of lower extremity ([address]     8. Venous stasis ulcer of other part of left lower leg limited to breakdown of skin without varicose veins (HCC)     9. Exertional shortness of breath  furosemide (LASIX) 40 MG tablet   10. Cough  furosemide (LASIX) 40 MG tablet   11. Former smoker  furosemide (LASIX) 40 MG tablet   12. KADE on CPAP  furosemide (LASIX) 40 MG tablet   13. Orthopnea  furosemide (LASIX) 40 MG tablet   14. Chronic systolic congestive heart failure (HCC)  furosemide (LASIX) 40 MG tablet   15.  Acute on chronic systolic congestive heart failure (HCC)  furosemide (LASIX) 40 MG tablet   16. Coughing  furosemide (LASIX) 40 MG tablet   17. Bilateral leg edema  furosemide (LASIX) 40 MG tablet   18. PND (paroxysmal nocturnal dyspnea)  furosemide (LASIX) 40 MG tablet         PLAN   Encourage to get labs/electrolytes done this week. Samples of Farxiga provided. CHF diet/leg elevation/compression therapy as tolerated Without vascular compromise/better sugar control and edema to promote wound healing/continue to see wound care specialist.  2 weeks follow up with looser fitted clothing to check on the wounds. No orders of the defined types were placed in this encounter. No follow-ups on file. Patient given educational materials - see patient instructions. Discussed use, benefit, and side effects of prescribed medications. All patient questions answered. Pt voiced understanding. Reviewed health maintenance.        Electronically signed Socorro Grey MD on 1/26/2022 at 10:28 AM EST

## 2022-03-29 ENCOUNTER — OFFICE VISIT (OUTPATIENT)
Dept: FAMILY MEDICINE CLINIC | Age: 47
End: 2022-03-29
Payer: COMMERCIAL

## 2022-03-29 VITALS
BODY MASS INDEX: 42.16 KG/M2 | TEMPERATURE: 97.6 F | OXYGEN SATURATION: 96 % | SYSTOLIC BLOOD PRESSURE: 104 MMHG | WEIGHT: 304 LBS | HEART RATE: 98 BPM | DIASTOLIC BLOOD PRESSURE: 60 MMHG

## 2022-03-29 DIAGNOSIS — R05.9 COUGHING: ICD-10-CM

## 2022-03-29 DIAGNOSIS — R60.0 BILATERAL LEG EDEMA: ICD-10-CM

## 2022-03-29 DIAGNOSIS — R06.00 PND (PAROXYSMAL NOCTURNAL DYSPNEA): ICD-10-CM

## 2022-03-29 DIAGNOSIS — I50.23 ACUTE ON CHRONIC SYSTOLIC CONGESTIVE HEART FAILURE (HCC): Primary | ICD-10-CM

## 2022-03-29 DIAGNOSIS — I50.22 CHRONIC SYSTOLIC CONGESTIVE HEART FAILURE (HCC): ICD-10-CM

## 2022-03-29 DIAGNOSIS — E11.69 TYPE 2 DIABETES MELLITUS WITH OTHER SPECIFIED COMPLICATION, WITHOUT LONG-TERM CURRENT USE OF INSULIN (HCC): ICD-10-CM

## 2022-03-29 DIAGNOSIS — R06.02 EXERTIONAL SHORTNESS OF BREATH: ICD-10-CM

## 2022-03-29 DIAGNOSIS — Z99.89 OSA ON CPAP: ICD-10-CM

## 2022-03-29 DIAGNOSIS — R05.9 COUGH: ICD-10-CM

## 2022-03-29 DIAGNOSIS — R73.02 GLUCOSE INTOLERANCE (IMPAIRED GLUCOSE TOLERANCE): ICD-10-CM

## 2022-03-29 DIAGNOSIS — R06.01 ORTHOPNEA: ICD-10-CM

## 2022-03-29 DIAGNOSIS — I50.20 SYSTOLIC CHF WITH REDUCED LEFT VENTRICULAR FUNCTION, NYHA CLASS 3 (HCC): ICD-10-CM

## 2022-03-29 DIAGNOSIS — E78.2 MIXED HYPERLIPIDEMIA: ICD-10-CM

## 2022-03-29 DIAGNOSIS — Z72.0 TOBACCO ABUSE: ICD-10-CM

## 2022-03-29 DIAGNOSIS — F17.200 SMOKER: ICD-10-CM

## 2022-03-29 DIAGNOSIS — G47.33 OSA ON CPAP: ICD-10-CM

## 2022-03-29 DIAGNOSIS — I10 ESSENTIAL (PRIMARY) HYPERTENSION: ICD-10-CM

## 2022-03-29 DIAGNOSIS — E66.01 MORBID OBESITY DUE TO EXCESS CALORIES (HCC): ICD-10-CM

## 2022-03-29 PROBLEM — I42.8 NONISCHEMIC CARDIOMYOPATHY (HCC): Status: ACTIVE | Noted: 2021-10-04

## 2022-03-29 PROBLEM — I50.9 HEART FAILURE, UNSPECIFIED (HCC): Status: ACTIVE | Noted: 2018-05-29

## 2022-03-29 PROBLEM — I49.3 PVC (PREMATURE VENTRICULAR CONTRACTION): Status: ACTIVE | Noted: 2021-10-04

## 2022-03-29 LAB — HBA1C MFR BLD: 6.6 %

## 2022-03-29 PROCEDURE — 3044F HG A1C LEVEL LT 7.0%: CPT

## 2022-03-29 PROCEDURE — 99214 OFFICE O/P EST MOD 30 MIN: CPT

## 2022-03-29 PROCEDURE — 99212 OFFICE O/P EST SF 10 MIN: CPT

## 2022-03-29 PROCEDURE — 83036 HEMOGLOBIN GLYCOSYLATED A1C: CPT

## 2022-03-29 RX ORDER — CARVEDILOL 12.5 MG/1
12.5 TABLET ORAL
COMMUNITY
Start: 2022-03-23 | End: 2022-07-07

## 2022-03-29 RX ORDER — FUROSEMIDE 80 MG
TABLET ORAL
Qty: 120 TABLET | Refills: 0 | Status: CANCELLED
Start: 2022-03-29

## 2022-03-29 NOTE — ASSESSMENT & PLAN NOTE
Asymptomatic, Please weight yourself daily, avoid salt.  See patient education attached to after visit summary

## 2022-03-29 NOTE — PATIENT INSTRUCTIONS
Patient Education      Please get labs drawn, start using CPAP, weight yourself daily, report weight gain to office please. A1C today was 6.6, we will continue to monitor that. Heart Failure: Care Instructions  Your Care Instructions     Heart failure occurs when your heart does not pump as much blood as the body needs. Failure does not mean that the heart has stopped pumping but rather that it is not pumping as well as it should. Over time, this causes fluid buildup in your lungs and other parts of your body. Fluid buildup can cause shortness of breath, fatigue, swollen ankles, and other problems. By taking medicines regularly, reducing sodium (salt) in your diet, checking your weight every day,and making lifestyle changes, you can feel better and live longer. Follow-up care is a key part of your treatment and safety. Be sure to make and go to all appointments, and call your doctor if you are having problems. It's also a good idea to know your test results and keep alist of the medicines you take. How can you care for yourself at home? Medicines     Be safe with medicines. Take your medicines exactly as prescribed. Call your doctor if you think you are having a problem with your medicine.      Do not take any vitamins, over-the-counter medicine, or herbal products without talking to your doctor first. Anuel Andrey not take ibuprofen (Advil or Motrin) and naproxen (Aleve) without talking to your doctor first. They could make your heart failure worse.      You may take some of the following medicine. ? Angiotensin-converting enzyme inhibitors (ACEIs) or angiotensin II receptor blockers (ARBs) reduce the heart's workload, lower blood pressure, and reduce swelling. Taking an ACEI or ARB may lower your chance of needing to be hospitalized. ? Beta-blockers can slow heart rate, decrease blood pressure, and improve your condition. Taking a beta-blocker may lower your chance of needing to be hospitalized. ?  Diuretics, also called water pills, reduce swelling. You will get more details on the specific medicines your doctor prescribes. Diet     Your doctor may suggest that you limit sodium. Your doctor can tell you how much sodium is right for you. An example is less than 3,000 mg a day. This includes all the salt you eat in cooking or in packaged foods. People get most of their sodium from processed foods. Fast food and restaurant meals also tend to be very high in sodium.      Ask your doctor how much liquid you can drink each day. You may have to limit liquids. Weight     Weigh yourself without clothing at the same time each day. Record your weight. Call your doctor if you have a sudden weight gain, such as more than 2 to 3 pounds in a day or 5 pounds in a week. (Your doctor may suggest a different range of weight gain.) A sudden weight gain may mean that your heart failure is getting worse. Activity level     Start light exercise (if your doctor says it is okay). Even if you can only do a small amount, exercise will help you get stronger, have more energy, and manage your weight and your stress. Walking is an easy way to get exercise. Start out by walking a little more than you did before. Bit by bit, increase the amount you walk.      When you exercise, watch for signs that your heart is working too hard. You are pushing yourself too hard if you cannot talk while you are exercising. If you become short of breath or dizzy or have chest pain, stop, sit down, and rest.      If you feel \"wiped out\" the day after you exercise, walk slower or for a shorter distance until you can work up to a better pace.      Get enough rest at night. Sleeping with 1 or 2 pillows under your upper body and head may help you breathe easier. Lifestyle changes     Do not smoke. Smoking can make a heart condition worse. If you need help quitting, talk to your doctor about stop-smoking programs and medicines.  These can increase your chances of quitting for good. Quitting smoking may be the most important step you can take to protect your heart.      Limit alcohol to 2 drinks a day for men and 1 drink a day for women. Too much alcohol can cause health problems.      Avoid getting sick from colds and the flu. Get a pneumococcal vaccine shot. If you have had one before, ask your doctor whether you need another dose. Get a flu shot each year. If you must be around people with colds or the flu, wash your hands often. When should you call for help? Call 911  if you have symptoms of sudden heart failure such as:     You have severe trouble breathing.      You cough up pink, foamy mucus.      You have a new irregular or rapid heartbeat. Call your doctor now or seek immediate medical care if:     You have new or increased shortness of breath.      You are dizzy or lightheaded, or you feel like you may faint.      You have sudden weight gain, such as more than 2 to 3 pounds in a day or 5 pounds in a week. (Your doctor may suggest a different range of weight gain.)      You have increased swelling in your legs, ankles, or feet.      You are suddenly so tired or weak that you cannot do your usual activities. Watch closely for changes in your health, and be sure to contact your doctor ifyou develop new symptoms. Where can you learn more? Go to https://infirst Healthcare.Formisimo. org and sign in to your PostRocket account. Enter M308 in the [facility] box to learn more about \"Heart Failure: Care Instructions. \"     If you do not have an account, please click on the \"Sign Up Now\" link. Current as of: January 10, 2022               Content Version: 13.2  © 6443-9810 [employer]. Care instructions adapted under license by [facility] ([facility]).  If you have questions about a medical condition or this instruction, always ask your healthcare professional. Tim Reed any warranty or liability for your use of this information. Patient Education        Avoiding Triggers With Heart Failure: Care Instructions  Your Care Instructions     Triggers are anything that make your heart failure flare up. A flare-up is also called \"sudden heart failure\" or \"acute heart failure. \" When you have a flare-up, fluid builds up in your lungs, and you have problems breathing. You might need to go to the hospital. By watching for changes in your condition andavoiding triggers, you can prevent heart failure flare-ups. Follow-up care is a key part of your treatment and safety. Be sure to make and go to all appointments, and call your doctor if you are having problems. It's also a good idea to know your test results and keep alist of the medicines you take. How can you care for yourself at home? Watch for changes in your weight and condition   Weigh yourself without clothing at the same time each day. Record your weight. Call your doctor if you have sudden weight gain, such as more than 2 to 3 pounds in a day or 5 pounds in a week. (Your doctor may suggest a different range of weight gain.) A sudden weight gain may mean that your heart failure is getting worse.  Keep a daily record of your symptoms. Write down any changes in how you feel, such as new shortness of breath, cough, or problems eating. Also record if your ankles are more swollen than usual and if you feel more tired than usual. Note anything that you ate or did that could have triggered these changes. Limit sodium  Sodium causes your body to hold on to extra water. This may cause your heart failure symptoms to get worse. People get most of their sodium from processedfoods. Fast food and restaurant meals also tend to be very high in sodium.  Your doctor may suggest that you limit sodium. Your doctor can tell you how much sodium is right for you. This includes limiting sodium in cooked and packaged foods.  Read food labels on cans and food packages.  They tell you how much sodium you get in one serving. Check the serving size. If you eat more than one serving, you are getting more sodium.  Be aware that sodium can come in forms other than salt, including monosodium glutamate (MSG), sodium citrate, and sodium bicarbonate (baking soda). MSG is often added to Asian food. You can sometimes ask for food without MSG or salt.  Slowly reducing salt will help you adjust to the taste. Take the salt shaker off the table.  Flavor your food with garlic, lemon juice, onion, vinegar, herbs, and spices instead of salt. Do not use soy sauce, steak sauce, onion salt, garlic salt, mustard, or ketchup on your food, unless it is labeled \"low-sodium\" or \"low-salt. \"   Make your own salad dressings, sauces, and ketchup without adding salt.  Use fresh or frozen ingredients, instead of canned ones, whenever you can. Choose low-sodium canned goods.  Eat less processed food and food from restaurants, including fast food. Exercise as directed  Moderate, regular exercise is very good for your heart. It improves your blood flow and helps control your weight. But too much exercise can stress your heartand cause a heart failure flare-up.  Check with your doctor before you start an exercise program.   Walking is an easy way to get exercise. Start out slowly. Gradually increase the length and pace of your walk. Swimming, riding a bike, and using a treadmill are also good forms of exercise.  When you exercise, watch for signs that your heart is working too hard. You are pushing yourself too hard if you cannot talk while you are exercising. If you become short of breath or dizzy or have chest pain, stop, sit down, and rest.   Do not exercise when you do not feel well. Take medicines correctly   Take your medicines exactly as prescribed. Call your doctor if you think you are having a problem with your medicine.  Make a list of all the medicines you take.  Include those prescribed to you by other doctors and any over-the-counter medicines, vitamins, or supplements you take. Take this list with you when you go to any doctor.  Take your medicines at the same time every day. It may help you to post a list of all the medicines you take every day and what time of day you take them.  Make taking your medicine as simple as you can. Plan times to take your medicines when you are doing other things, such as eating a meal or getting ready for bed. This will make it easier to remember to take your medicines.  Get organized. Use helpful tools, such as daily or weekly pill containers. When should you call for help? Call 911  if you have symptoms of sudden heart failure such as:     You have severe trouble breathing.      You cough up pink, foamy mucus.      You have a new irregular or rapid heartbeat. Call your doctor now or seek immediate medical care if:     You have new or increased shortness of breath.      You are dizzy or lightheaded, or you feel like you may faint.      You have sudden weight gain, such as more than 2 to 3 pounds in a day or 5 pounds in a week. (Your doctor may suggest a different range of weight gain.)      You have increased swelling in your legs, ankles, or feet.      You are suddenly so tired or weak that you cannot do your usual activities. Watch closely for changes in your health, and be sure to contact your doctor ifyou develop new symptoms. Where can you learn more? Go to https://OzmosisjereAccenx Technologies.BrightLocker. org and sign in to your Catalyst Mobile account. Enter N576 in the [facility] box to learn more about \"Avoiding Triggers With Heart Failure: Care Instructions. \"     If you do not have an account, please click on the \"Sign Up Now\" link. Current as of: January 10, 2022               Content Version: 13.2  © 1261-6609 Healthwise, Incorporated. Care instructions adapted under license by [facility] ([facility]).  If you have questions about a medical condition or this instruction, always ask your healthcare professional. [address] any warranty or liability for your use of this information. Patient Education        Learning About Restricting Fluids When You Have Heart Failure  Why is it important to limit fluids when you have heart failure? With heart failure, having too much fluid in your body can lower sodium levels in the blood. It can also cause symptoms such as swelling. Limiting fluids, if your doctor tells you to, can help balance your body's sodium level. It mayalso help you feel better. How can you care for yourself at home?  Find a way of tracking the fluids you take in that works for you. Here are two methods you can try:  ? Write down how much you drink throughout the day. ? Keep a container filled with the amount of liquid allowed for the day. As you drink liquids during the day, such as a 6-ounce cup of coffee, pour that same amount out of the container. When the container is empty, you've had your liquid for the day.  Count any foods that will melt (such as ice cream, gelatin, or flavored ice treats) or liquid foods (such as soup) as part of your fluids for the day. Also count the liquid in canned fruits and vegetables as part of your daily intake, or drain them well before serving.  Space your liquids throughout the day. Then you won't be tempted to drink more than the amount your doctor recommends.  To relieve thirst without taking in extra water, try chewing gum, sucking on hard candy (sugarless if you have diabetes), or rinsing your mouth with water and spitting it out. Where can you learn more? Go to https://davon.SterraClimb. org and sign in to your LocAsian account. Enter F350 in the [facility] box to learn more about \"Learning About Restricting Fluids When You Have Heart Failure. \"     If you do not have an account, please click on the \"Sign Up Now\" link.   Current as of: January 10, 2022               Content Version: 13.2  © 6005-2180 Healthwise, Incorporated. Care instructions adapted under license by [facility] ([facility]). If you have questions about a medical condition or this instruction, always ask your healthcare professional. [address] any warranty or liability for your use of this information.

## 2022-03-30 ASSESSMENT — ENCOUNTER SYMPTOMS
COUGH: 1
DIARRHEA: 0
ABDOMINAL PAIN: 0
EYE ITCHING: 0
SINUS PRESSURE: 0
SINUS PAIN: 0
BACK PAIN: 0
EYE DISCHARGE: 0
CONSTIPATION: 0
WHEEZING: 0
NAUSEA: 0
CHEST TIGHTNESS: 0
RHINORRHEA: 0
SHORTNESS OF BREATH: 0
COLOR CHANGE: 0

## 2022-06-14 ENCOUNTER — TELEPHONE (OUTPATIENT)
Dept: FAMILY MEDICINE CLINIC | Age: 47
End: 2022-06-14

## 2022-06-14 NOTE — TELEPHONE ENCOUNTER
----- Message from April Sanchez sent at 6/14/2022  9:22 AM EDT -----  Subject: Message to Provider    QUESTIONS  Information for Provider? Care Manager with [facility] wants to leave number   in case we need anything for the patient [phone]   ---------------------------------------------------------------------------  --------------  [address]  What is the best way for the office to contact you? OK to leave message on   voicemail  Preferred Call Back Phone Number? [phone]  ---------------------------------------------------------------------------  --------------  SCRIPT ANSWERS  Relationship to Patient? Third Party  Third Party Type? Insurance? Representative Name?  [employer]

## 2022-07-07 ENCOUNTER — OFFICE VISIT (OUTPATIENT)
Dept: FAMILY MEDICINE CLINIC | Age: 47
End: 2022-07-07
Payer: COMMERCIAL

## 2022-07-07 VITALS
DIASTOLIC BLOOD PRESSURE: 62 MMHG | WEIGHT: 302 LBS | BODY MASS INDEX: 41.88 KG/M2 | HEART RATE: 97 BPM | OXYGEN SATURATION: 95 % | SYSTOLIC BLOOD PRESSURE: 98 MMHG

## 2022-07-07 DIAGNOSIS — R05.9 COUGHING: ICD-10-CM

## 2022-07-07 DIAGNOSIS — E11.69 TYPE 2 DIABETES MELLITUS WITH OTHER SPECIFIED COMPLICATION, WITHOUT LONG-TERM CURRENT USE OF INSULIN (HCC): Primary | ICD-10-CM

## 2022-07-07 DIAGNOSIS — R06.01 ORTHOPNEA: ICD-10-CM

## 2022-07-07 DIAGNOSIS — I50.23 ACUTE ON CHRONIC SYSTOLIC CONGESTIVE HEART FAILURE (HCC): ICD-10-CM

## 2022-07-07 DIAGNOSIS — R06.00 PND (PAROXYSMAL NOCTURNAL DYSPNEA): ICD-10-CM

## 2022-07-07 DIAGNOSIS — R06.02 EXERTIONAL SHORTNESS OF BREATH: ICD-10-CM

## 2022-07-07 DIAGNOSIS — Z87.891 FORMER SMOKER: ICD-10-CM

## 2022-07-07 DIAGNOSIS — I50.22 CHRONIC SYSTOLIC CONGESTIVE HEART FAILURE (HCC): ICD-10-CM

## 2022-07-07 DIAGNOSIS — R60.0 BILATERAL LEG EDEMA: ICD-10-CM

## 2022-07-07 DIAGNOSIS — R05.9 COUGH: ICD-10-CM

## 2022-07-07 PROCEDURE — 3044F HG A1C LEVEL LT 7.0%: CPT

## 2022-07-07 PROCEDURE — 99214 OFFICE O/P EST MOD 30 MIN: CPT

## 2022-07-07 RX ORDER — AMIODARONE HYDROCHLORIDE 400 MG/1
TABLET ORAL 2 TIMES DAILY
COMMUNITY
Start: 2022-06-10

## 2022-07-07 RX ORDER — FUROSEMIDE 40 MG/1
80 TABLET ORAL 2 TIMES DAILY
Qty: 180 TABLET | Refills: 0
Start: 2022-07-07 | End: 2022-08-31

## 2022-07-07 ASSESSMENT — ENCOUNTER SYMPTOMS
BACK PAIN: 0
WHEEZING: 0
SINUS PRESSURE: 0
COLOR CHANGE: 0
CHEST TIGHTNESS: 0
SINUS PAIN: 0
EYE ITCHING: 0
RHINORRHEA: 0
NAUSEA: 0
DIARRHEA: 0
SHORTNESS OF BREATH: 0
CONSTIPATION: 0
ABDOMINAL PAIN: 0
COUGH: 0
EYE DISCHARGE: 0

## 2022-07-07 NOTE — PATIENT INSTRUCTIONS
Patient Education      2,000ml depending on working and environment conditions. .   Fluid Restriction: Care Instructions  Your Care Instructions     A buildup of fluid in the body can cause low sodium levels in the blood. It may also cause symptoms such as swelling and pain. Your doctor may suggest that you limit liquids, including foods that contain a lot of liquid. Limiting liquidsis called fluid restriction. Keeping track of the amount of fluids you take in may help you feel better. Your doctor will tell you how much fluid you can have in a day. Follow-up care is a key part of your treatment and safety. Be sure to make and go to all appointments, and call your doctor if you are having problems. It's also a good idea to know your test results and keep alist of the medicines you take. How can you care for yourself at home?  Find a way of tracking the fluids you take in that works for you. Here are two methods you can try:  ? Write down how much you drink throughout the day. ? Keep a container filled with the amount of liquid allowed for the day. As you drink liquids during the day, such as a 6-ounce cup of coffee, pour that same amount out of the container. When the container is empty, you've had your liquid for the day.  Count any foods that will melt (such as ice cream, gelatin, or flavored ice treats) or liquid foods (such as soup) as part of your fluids for the day. Also count the liquid in canned fruits and vegetables as part of your daily intake, or drain them well before serving.  Space your liquids throughout the day. Then you won't be tempted to drink more than the amount your doctor recommends.  To relieve thirst without taking in extra water, try chewing gum, sucking on hard candy (sugarless if you have diabetes), or rinsing your mouth with water and spitting it out. Where can you learn more? Go to [email or web address]://chamish.[employer]. org and sign in to your [facility] account.  Enter I025 in the Search Health Information box to learn more about \"Fluid Restriction: Care Instructions. \"     If you do not have an account, please click on the \"Sign Up Now\" link. Current as of: January 10, 2022               Content Version: 13.3  © 5485-6405 Healthwise, Incorporated. Care instructions adapted under license by [facility] ([facility]). If you have questions about a medical condition or this instruction, always ask your healthcare professional. [address] any warranty or liability for your use of this information.

## 2022-07-07 NOTE — ASSESSMENT & PLAN NOTE
Borderline controlled, continue current medications and continue current treatment plan continue current medications as they are. He was to be on Bumex tells me he lost the prescription so he was taking his original dosage of Lasix being 80 mg twice daily. He has a office visit with cardiology tomorrow. I encouraged him to discuss this further with cardiology. He tells me that he thinks Lasix works better than Bumex. He is also on Entresto and Pacerone. He denies any side effects with these medications. He is to be on a fluid restriction to help with this, he tells me when he reduces his fluids to 15 100-2000 and he notices he does not have orthopnea, and has exertional shortness of breath reduces. As well as his proximal nocturnal dyspnea.

## 2022-07-07 NOTE — ASSESSMENT & PLAN NOTE
At goal, continue current medications and continue current treatment plan continue to monitor blood sugar, continue to eat a diet that is low in carbs. Diabetic foot exam done in office today, encouraged to make diabetic eye exam appointment, and continue metformin 500 mg extended release tablets 2 tablets by mouth twice daily.

## 2022-07-07 NOTE — ASSESSMENT & PLAN NOTE
Borderline controlled, continue current medications and continue current treatment plan continue follow-up with vascular, he received his lymphedema pumps yesterday, has worn them for total of 4 hours 2 hours both days. He states he does not know if this makes much of an improvement, however he is only used it twice. He is encouraged to continue with the lymphedema pumps, as well as to monitor for any wounds that he may occur. He is still seeing wound management, for wounds on his lower legs. He tells me they are exacerbated because of the edema. Ways of managing edema, how edema is formulated due to CHF, and how the lymphedema pumps will help were all discussed. Francis verbalizes understanding of this and denies any further questions.

## 2022-07-07 NOTE — ASSESSMENT & PLAN NOTE
Borderline controlled, Magdaleno Monday was in the hospital with pneumonia in the beginning of June, was worried that this is still ongoing symptoms. He is instructed that pneumonia does not clear for several months on chest x-ray, so there is no way of truly remeasuring. He denies fever chills, increasing shortness of breath that is worse. Or sputum that is purulent. He is instructed to let the office know if he develops a cough that is purulent, or fever and chills. He tells me that he understands this and denies any further questions. Etiology of his cough is most likely from CHF and fluid retention, and this is discussed at great length. He is encouraged to continue his fluid management and restriction to 2000 to 1500 mL. He is agreeable to this, and tells me that when he does stick to this it does decrease his cough. He is to weigh himself every day and notify office is of weight gain greater than 5 pounds. independent

## 2022-08-31 ENCOUNTER — OFFICE VISIT (OUTPATIENT)
Dept: FAMILY MEDICINE CLINIC | Age: 47
End: 2022-08-31
Payer: COMMERCIAL

## 2022-08-31 ENCOUNTER — TELEPHONE (OUTPATIENT)
Dept: FAMILY MEDICINE CLINIC | Age: 47
End: 2022-08-31

## 2022-08-31 VITALS
DIASTOLIC BLOOD PRESSURE: 72 MMHG | OXYGEN SATURATION: 95 % | BODY MASS INDEX: 45.07 KG/M2 | HEART RATE: 76 BPM | WEIGHT: 315 LBS | SYSTOLIC BLOOD PRESSURE: 98 MMHG

## 2022-08-31 DIAGNOSIS — I50.20 SYSTOLIC CHF WITH REDUCED LEFT VENTRICULAR FUNCTION, NYHA CLASS 3 (HCC): ICD-10-CM

## 2022-08-31 DIAGNOSIS — I50.23 ACUTE ON CHRONIC SYSTOLIC CONGESTIVE HEART FAILURE (HCC): ICD-10-CM

## 2022-08-31 DIAGNOSIS — R05.9 COUGHING: ICD-10-CM

## 2022-08-31 DIAGNOSIS — R60.0 BILATERAL LEG EDEMA: ICD-10-CM

## 2022-08-31 DIAGNOSIS — I50.22 CHRONIC SYSTOLIC CONGESTIVE HEART FAILURE (HCC): Primary | ICD-10-CM

## 2022-08-31 LAB
B-TYPE NATRIURETIC PEPTIDE: 1900 PG/ML (ref 0–100)
BASOPHILS %: 1.88 (ref 0–3)
BASOPHILS ABSOLUTE: 0.14 (ref 0–0.3)
EOSINOPHILS %: 0.15 (ref 0–10)
EOSINOPHILS ABSOLUTE: 0.01 (ref 0–1.1)
HCT VFR BLD CALC: 47.4 % (ref 42–52)
HEMOGLOBIN: 16 (ref 13.8–17.8)
LYMPHOCYTE %: 13.23 (ref 20–51.1)
LYMPHOCYTES ABSOLUTE: 1 (ref 1–5.5)
MCH RBC QN AUTO: 29.2 PG (ref 28.5–32.5)
MCHC RBC AUTO-ENTMCNC: 33.7 G/DL (ref 32–37)
MCV RBC AUTO: 86.6 FL (ref 80–94)
MONOCYTES %: 12.03 (ref 1.7–9.3)
MONOCYTES ABSOLUTE: 0.91 (ref 0.1–1)
NEUTROPHILS %: 72.72 (ref 42.2–75.2)
NEUTROPHILS ABSOLUTE: 5.49 (ref 2–8.1)
PDW BLD-RTO: 17.2 % (ref 10–15.5)
PLATELET # BLD: 215.9 THOU/MM3 (ref 130–400)
RBC: 5.48 M/UL (ref 4.7–6.1)
WBC: 7.5 THOU/ML3 (ref 4.8–10.8)

## 2022-08-31 PROCEDURE — 99214 OFFICE O/P EST MOD 30 MIN: CPT

## 2022-08-31 RX ORDER — BUMETANIDE 2 MG/1
2 TABLET ORAL 4 TIMES DAILY
COMMUNITY
Start: 2022-04-05 | End: 2022-09-19 | Stop reason: SDUPTHER

## 2022-08-31 ASSESSMENT — ENCOUNTER SYMPTOMS
ABDOMINAL PAIN: 0
CHEST TIGHTNESS: 0
SHORTNESS OF BREATH: 1
WHEEZING: 0
COUGH: 1

## 2022-08-31 NOTE — PATIENT INSTRUCTIONS
Increase Bumex to 2mg three times a day for the next 4 days, Daily weights, fluid restriction to 1,500ml or 1.5L. Call cardio and let them know your symptoms as well.

## 2022-08-31 NOTE — ASSESSMENT & PLAN NOTE
Uncontrolled, changes made today: Continue Bumex, however change dosing for the next 4 days. Take 2 mg of Bumex 3 times a day before meals for 4 days. Continue taking Entresto 49-51 mg once daily. And Pacerone for 100 mg twice daily.

## 2022-08-31 NOTE — PROGRESS NOTES
Bennie Jack (:  1975) is a 56 y.o. male,Established patient, here for evaluation of the following chief complaint(s):  Congestive Heart Failure (Patient is here today for weight gain. He has gained 24  lbs since last being seen. He states that his sinuses have gotten worse with drainage, pressure and a cough. )         ASSESSMENT/PLAN:  1. Chronic systolic congestive heart failure (HCC)  -     Brain Natriuretic Peptide; Future  -     CBC with Auto Differential; Future  2. Coughing  -     Brain Natriuretic Peptide; Future  -     ECHO 2D WO Color Doppler Complete; Future  3. Bilateral leg edema  -     Brain Natriuretic Peptide; Future  -     ECHO 2D WO Color Doppler Complete; Future  4. Systolic CHF with reduced left ventricular function, NYHA class 3 (HCC)  -     Brain Natriuretic Peptide; Future  -     CBC with Auto Differential; Future  -     ECHO 2D WO Color Doppler Complete; Future  5. Acute on chronic systolic congestive heart failure (HCC)  Assessment & Plan:   Uncontrolled, changes made today: Continue Bumex, however change dosing for the next 4 days. Take 2 mg of Bumex 3 times a day before meals for 4 days. Continue taking Entresto 49-51 mg once daily. And Pacerone for 100 mg twice daily. Return if symptoms worsen or fail to improve. Subjective   SUBJECTIVE/OBJECTIVE:  Adelaide Bearden is here today with a complaint of increased weight gain, increased shortness of breath, increased edema. He has a known history of CHF last ejection fraction was noted to be at 35% several years prior. He does follow-up with Dr. Zenon Graff at [facility] cardiology here [facility] [place]. He has not let them know about the weight gain of the last month. He is on Entresto, and was changed from Lasix to Bumex, also on Pacerone. He is not taking Entresto twice a day because of his blood pressure. He had chronically low blood pressure, and believes this is why he was only on this once a day.   He denies dizziness, chest pain, but does state that he is more short of breath especially with ambulation. Physical exam and review of systems does show positive findings of acute congestive heart failure. With this being the case, is chronically low sodium, and the amount of weight gain that he has had I believe that this would be best managed in a hospital setting. Direct admission, as well as referral to the ER immediately is discussed at quite length with Pravin Zarate. He is not wanting to do this at this time, risk such as worsening CHF, and death is discussed with Pravin Zarate. He verbalized understanding of this. At this time we will manage it as an outpatient, will order an echo as I have not seen 1 being completed in the last year or 2, we will also get a BNP and other baseline labs. He is to get these labs soon as possible, increase his Bumex dosing, reduce fluid intake to 1500 mL, use his lymphedema pumps, call cardiology today and let them know what is going on so they can further advise him and have a follow-up appointment as soon as possible. Pravin Zarate verbalizes understanding this plan of care, will get the labs today and call cardiology today. He is instructed if his symptoms get any worse that he is to go to the ER immediately, or if he changes his mind he will most certainly should still go to the emergency room. Francis verbalizes understanding of this, and denies any further questions. Congestive Heart Failure  Presents for follow-up visit. Associated symptoms include edema, fatigue, nocturia, shortness of breath and unexpected weight change. Pertinent negatives include no abdominal pain, chest pain or palpitations. The symptoms have been worsening. Compliance with total regimen is 51-75%. Compliance problems include adherence to diet. Compliance with diet is 51-75%. Compliance with exercise is 51-75%. Compliance with medications is 51-75%. Review of Systems   Constitutional:  Positive for fatigue and unexpected weight change. Negative for chills, diaphoresis and fever. HENT:  Positive for postnasal drip. Negative for congestion and ear pain. Respiratory:  Positive for cough and shortness of breath. Negative for chest tightness and wheezing. Cardiovascular:  Positive for leg swelling. Negative for chest pain and palpitations. Gastrointestinal:  Negative for abdominal pain. Genitourinary:  Positive for nocturia. Negative for dysuria. Neurological:  Negative for syncope, light-headedness and numbness. Objective   Physical Exam  Vitals and nursing note reviewed. Constitutional:       Appearance: Normal appearance. HENT:      Head: Normocephalic and atraumatic. Right Ear: External ear normal.      Left Ear: External ear normal.      Nose: Congestion present. Mouth/Throat:      Mouth: Mucous membranes are moist.      Pharynx: Oropharynx is clear. Eyes:      Pupils: Pupils are equal, round, and reactive to light. Cardiovascular:      Rate and Rhythm: Normal rate. Pulses: Normal pulses. Heart sounds: Murmur heard. Pulmonary:      Effort: Pulmonary effort is normal.      Breath sounds: Rales present. Abdominal:      General: Bowel sounds are normal.      Tenderness: There is no abdominal tenderness. Musculoskeletal:      Cervical back: Normal range of motion. Right lower leg: Edema present. Left lower leg: Edema present. Skin:     General: Skin is warm and dry. Capillary Refill: Capillary refill takes 2 to 3 seconds. Neurological:      General: No focal deficit present. Mental Status: He is alert and oriented to person, place, and time. An electronic signature was used to authenticate this note.     --Felisa Patient, APRN - CNP

## 2022-08-31 NOTE — TELEPHONE ENCOUNTER
Called form the labs on a critical BNP-  over 1300. Review of labs shows this is not unexpected and lower than earlier in the year. Bumex increased at the office visit today. No new orders at this time. To PCP for FYI.

## 2022-09-01 ENCOUNTER — TELEPHONE (OUTPATIENT)
Dept: FAMILY MEDICINE CLINIC | Age: 47
End: 2022-09-01

## 2022-09-06 ENCOUNTER — OFFICE VISIT (OUTPATIENT)
Dept: CARDIOLOGY | Age: 47
End: 2022-09-06
Payer: COMMERCIAL

## 2022-09-06 VITALS
DIASTOLIC BLOOD PRESSURE: 60 MMHG | HEART RATE: 83 BPM | BODY MASS INDEX: 44.38 KG/M2 | SYSTOLIC BLOOD PRESSURE: 112 MMHG | WEIGHT: 315 LBS

## 2022-09-06 DIAGNOSIS — R06.02 SOB (SHORTNESS OF BREATH): Primary | ICD-10-CM

## 2022-09-06 DIAGNOSIS — E78.00 PURE HYPERCHOLESTEROLEMIA: ICD-10-CM

## 2022-09-06 DIAGNOSIS — I50.23 CHF NYHA CLASS II, ACUTE ON CHRONIC, SYSTOLIC (HCC): ICD-10-CM

## 2022-09-06 DIAGNOSIS — E66.01 CLASS 2 SEVERE OBESITY DUE TO EXCESS CALORIES WITH SERIOUS COMORBIDITY IN ADULT, UNSPECIFIED BMI (HCC): ICD-10-CM

## 2022-09-06 DIAGNOSIS — Z98.890 S/P CARDIAC CATH: ICD-10-CM

## 2022-09-06 DIAGNOSIS — G47.33 OSA (OBSTRUCTIVE SLEEP APNEA): ICD-10-CM

## 2022-09-06 DIAGNOSIS — I47.29 NSVT (NONSUSTAINED VENTRICULAR TACHYCARDIA): ICD-10-CM

## 2022-09-06 PROCEDURE — 99214 OFFICE O/P EST MOD 30 MIN: CPT | Performed by: INTERNAL MEDICINE

## 2022-09-06 RX ORDER — MIDODRINE HYDROCHLORIDE 2.5 MG/1
TABLET ORAL
COMMUNITY
Start: 2022-06-08 | End: 2022-09-06

## 2022-09-06 RX ORDER — METOPROLOL SUCCINATE 50 MG/1
TABLET, EXTENDED RELEASE ORAL
COMMUNITY
Start: 2022-06-11

## 2022-09-06 NOTE — PROGRESS NOTES
true        No Known Allergies    Current Outpatient Medications   Medication Sig Dispense Refill    bumetanide (BUMEX) 2 MG tablet Take 2 mg by mouth 3 times daily      PACERONE 400 MG tablet 2 times daily      ENTRESTO 49-51 MG per tablet take 1 tablet by mouth twice a day      metFORMIN (GLUCOPHAGE-XR) 500 MG extended release tablet take 2 tablets by mouth twice a day 120 tablet 2    potassium chloride (KLOR-CON M) 20 MEQ extended release tablet Take 1 tablet by mouth daily (Patient not taking: No sig reported) 60 tablet 1    cetirizine (ZYRTEC ALLERGY) 10 MG tablet Take 1 tablet by mouth daily 30 tablet 5    blood glucose monitor kit and supplies Test 2 times a day & as needed for symptoms of irregular blood glucose. 1 kit 0    blood glucose monitor strips Use 2 times daily to test sugars 100 strip 5    Lancets MISC Use 2 times daily 100 each 5    aspirin 81 MG tablet Take 81 mg by mouth daily Patient now has 81 mg aspirin      Acetaminophen (TYLENOL PO) Take  by mouth as needed. No current facility-administered medications for this visit. Patient Active Problem List   Diagnosis    Insomnia    Sinusitis, chronic    Venous insufficiency    Varicose vein    Morbid obesity due to excess calories (HCC)    KADE on CPAP    Glucose intolerance (impaired glucose tolerance)    Tobacco abuse    Depression with anxiety    Impaired fasting glucose    Systolic CHF with reduced left ventricular function, NYHA class 3 ([address]    Essential (primary) hypertension    Diabetes mellitus, type II (HCC)    Heart failure, unspecified (HCC)    Acute on chronic systolic congestive heart failure (HCC)    Mixed hyperlipidemia    Nonischemic cardiomyopathy (HCC)    PVC (premature ventricular contraction)    Exertional shortness of breath    Coughing    Bilateral leg edema           REVIEW OF SYSTEMS:    Constitutional: there has been no unanticipated weight loss.  There's been No change in energy level, No change in activity level.     Eyes: No visual changes or diplopia. No scleral icterus. ENT: No Headaches, hearing loss or vertigo. No mouth sores or sore throat. Cardiovascular: per hpi  Respiratory: per hpi  Gastrointestinal: No abdominal pain, appetite loss, blood in stools. No change in bowel or bladder habits. Genitourinary: No dysuria, trouble voiding, or hematuria. Musculoskeletal:  No gait disturbance, No weakness or joint complaints. Integumentary: No rash or pruritis. Neurological: No headache, diplopia, change in muscle strength, numbness or tingling. No change in gait, balance, coordination, mood, affect, memory, mentation, behavior. Psychiatric: No new anxiety or depression. Endocrine: No temperature intolerance. No excessive thirst, fluid intake, or urination. No tremor. Hematologic/Lymphatic: No abnormal bruising or bleeding, blood clots or swollen lymph nodes. Allergic/Immunologic: No nasal congestion or hives. Physical Exam:   Vitals: There were no vitals taken for this visit. General appearance: alert and cooperative with exam  HEENT: Head: Normocephalic, no lesions, without obvious abnormality. Eyes: PERRL, EOMI  Ears: Not obvious deformations or lack of hearing  Neck: no carotid bruit, no JVD  Lungs: clear to auscultation bilaterally  Heart: regular rate and rhythm, S1, S2 normal, no murmur, click, rub or gallop  Abdomen: soft, non-tender; bowel sounds normal; no masses,  no organomegaly  Extremities: extremities normal, atraumatic, no cyanosis or edema  Neurologic: Mental status: Alert, oriented, thought content appropriate  Skin: WNL for age and condition, no obvious rashes or leasions      EKG: Normal Sinus Rhythm with no ischemic changes.   Reviewed today's ECG along serial ECGs    LAST ECHO: 4/09/21  EF 35%  Mild MR  LC dilation at 7.8cm    LAST STRESS:    LAST CATH: 2017  Normal cors      Past Medical and Surgical History, Problem List, Allergies, Medications, Labs, Imaging, all reviewed extensively in EMR and with the patient. Assessment and Plan:    Exacerbation of systolic CHF with Increasing edema. PCP increased bumex and he feels better. Has lost weight and edema improving  Dilated cardiomyopathy- Chronic systolic heart failure. Optimize meds. EF in 30-35% range. Has declined ICD in past.  NSVT- on amio, stable  KADE on CPAP- stable, unremarkable  HTN- Chronic. Cassybrendan Galicia well controlled at this time. Cont to optimize meds. Morbid Obesity - Chronic. Encouraged diet, exercise, and discussed weight loss extensively. Tobacco abuse. Chronic. Discussed extensively and gave options to help with quitting. Hyperlipidemia- Chronic. As above. LDL goal < 70. Optimize therapy. Thank you for allowing me to participate in the care of this patient, please do not hesitate to call if you have any questions. Mary Ann Saleh, [id number] [facility]  [facility]  [phone]

## 2022-09-06 NOTE — PROGRESS NOTES
Cardiology Consultation  [facility] [address] [place] Ros[place] Subhash Mimsn  [phone]      9/6/22      Regarding:  Bennie Jack  1975      To Whom It May Concern,      Patient is under my care and needs to be off work until Sunday September 18, 2022.       Thank you,      Dr. Sagar Lemus, DO  Cardiology    Electronically signed by Koki Montaño

## 2022-09-12 ENCOUNTER — TELEPHONE (OUTPATIENT)
Dept: CARDIOLOGY | Age: 47
End: 2022-09-12

## 2022-09-12 NOTE — TELEPHONE ENCOUNTER
Pt called to ask for a corrected work note. He is suppose to return to work on 9/19/22, Monday but he note he has at home states to return on 9/18/22 the way it is written. Jose Antonio is asking for one that states \"Return to work on 9/19/2022\". Pt will .     [phone]

## 2022-09-13 NOTE — TELEPHONE ENCOUNTER
Cardiology Consultation  [facility] [address], [address] [place] Partida  [phone]        9/6/22        Regarding:  Brian Eagle  1975        To Whom It May Concern,        Patient is under my care and needs to be off work until Sunday September 19, 2022.         Thank you,        Dr. Hiral Zamora, DO  Cardiology     Electronically signed by Hiral Zamora, [address] [facility]

## 2022-09-16 ENCOUNTER — TELEPHONE (OUTPATIENT)
Dept: FAMILY MEDICINE CLINIC | Age: 47
End: 2022-09-16

## 2022-09-16 NOTE — TELEPHONE ENCOUNTER
[facility] Transitions Initial Follow Up Call    Outreach made within 2 business days of discharge: Yes    Patient: Ramone Holguin Patient : 1975   MRN: [id number]  Reason for Admission: No discharge information exists for this patient. Discharge Date:         Spoke with: Ramone Holguin      Discharge department/facility: [facility]     TCM Interactive Patient Contact:  Was patient able to fill all prescriptions: Yes  Was patient instructed to bring all medications to the follow-up visit: Yes  Is patient taking all medications as directed in the discharge summary?  Yes  Does patient understand their discharge instructions: Yes  Does patient have questions or concerns that need addressed prior to 7-14 day follow up office visit: no    Scheduled appointment with PCP within 7-14 days    Follow Up  Future Appointments   Date Time Provider Najma Person   2022  9:30 AM KLAUDIA Darby - CNP [facility]       Abdiel Gonzalez LPN

## 2022-09-19 ENCOUNTER — OFFICE VISIT (OUTPATIENT)
Dept: FAMILY MEDICINE CLINIC | Age: 47
End: 2022-09-19
Payer: COMMERCIAL

## 2022-09-19 VITALS
OXYGEN SATURATION: 95 % | DIASTOLIC BLOOD PRESSURE: 60 MMHG | SYSTOLIC BLOOD PRESSURE: 104 MMHG | HEART RATE: 74 BPM | BODY MASS INDEX: 41.33 KG/M2 | WEIGHT: 298 LBS

## 2022-09-19 DIAGNOSIS — I50.22 CHRONIC SYSTOLIC CONGESTIVE HEART FAILURE (HCC): ICD-10-CM

## 2022-09-19 DIAGNOSIS — Z09 HOSPITAL DISCHARGE FOLLOW-UP: Primary | ICD-10-CM

## 2022-09-19 DIAGNOSIS — R60.0 BILATERAL LEG EDEMA: ICD-10-CM

## 2022-09-19 DIAGNOSIS — I50.23 ACUTE ON CHRONIC SYSTOLIC CONGESTIVE HEART FAILURE (HCC): ICD-10-CM

## 2022-09-19 PROCEDURE — 99214 OFFICE O/P EST MOD 30 MIN: CPT

## 2022-09-19 PROCEDURE — 1111F DSCHRG MED/CURRENT MED MERGE: CPT

## 2022-09-19 RX ORDER — CEFDINIR 300 MG/1
CAPSULE ORAL
Status: ON HOLD | COMMUNITY
Start: 2022-09-15 | End: 2022-10-13

## 2022-09-19 RX ORDER — BUMETANIDE 2 MG/1
4 TABLET ORAL 2 TIMES DAILY
Qty: 90 TABLET | Refills: 1
Start: 2022-09-19

## 2022-09-19 RX ORDER — METOLAZONE 5 MG/1
5 TABLET ORAL DAILY
COMMUNITY
Start: 2022-09-15

## 2022-09-19 NOTE — PROGRESS NOTES
Post-Discharge Transitional Care Management Progress Note      Rustam Mcmillan   YOB: 1975    Date of Office Visit:  9/19/2022  Date of Hospital Admission: 9/12/2022  Date of Hospital Discharge: 9/15/2022    Care management risk score Rising risk (score 2-5) and Complex Care (Scores >=6): No Risk Score On File     Non face to face  following discharge, date last encounter closed (first attempt may have been earlier): 09/16/2022 09/16/2022    Call initiated 2 business days of discharge: Yes    ASSESSMENT/PLAN:   Hospital discharge follow-up  -     [place] DISCHARGE MEDS RECONCILED W/ CURRENT OUTPATIENT MED LIST    Medical Decision Making: moderate complexity  No follow-ups on file. Subjective:   HPI:  Follow up of Hospital problems/diagnosis(es): CHF, pneumonia    Inpatient course: Discharge summary reviewed- see chart. Interval history/Current status: He states he is feeling much better than prior to hospitalization. He has lost approximately 22 pounds. States he did have some dizziness, was taking Bumex incorrectly. He was taking 2 mg every 2-3 hours. He is to use Bumex 4 mg twice daily, Zaroxolyn prior to first dose of Bumex. He was placed on potassium chloride in hospital, we will monitor his potassium level and kidney function in 1 week with a BMP. He states his edema has decreased, his shortness of breath has decreased. On azithromycin, he should continue to be on azithromycin until this is completed. We will need to do close follow-up, he will need to see cardiology within the week. He is encouraged to let the office know if his symptoms do not resolve or if he has weight gain of more than 5 pounds in 2 to 3 days.   He is to monitor his weight daily    Patient Active Problem List   Diagnosis    Insomnia    Sinusitis, chronic    Venous insufficiency    Varicose vein    Morbid obesity due to excess calories (HCC)    KADE on CPAP    Glucose intolerance (impaired glucose tolerance) Tobacco abuse    Depression with anxiety    Impaired fasting glucose    Systolic CHF with reduced left ventricular function, NYHA class 3 (HCC)    Essential (primary) hypertension    Diabetes mellitus, type II (HCC)    Heart failure, unspecified (HCC)    Acute on chronic systolic congestive heart failure (HCC)    Mixed hyperlipidemia    Nonischemic cardiomyopathy (HCC)    PVC (premature ventricular contraction)    Exertional shortness of breath    Coughing    Bilateral leg edema       Medications listed as ordered at the time of discharge from hospital     Medication List            Accurate as of September 19, 2022 10:05 AM. If you have any questions, ask your nurse or doctor. CHANGE how you take these medications      potassium chloride 20 MEQ extended release tablet  Commonly known as: KLOR-CON M  Take 1 tablet by mouth daily  What changed:   when to take this  additional instructions            CONTINUE taking these medications      aspirin 81 MG tablet     blood glucose monitor kit and supplies  Test 2 times a day & as needed for symptoms of irregular blood glucose.      blood glucose test strips  Use 2 times daily to test sugars     bumetanide 2 MG tablet  Commonly known as: BUMEX     cefdinir 300 MG capsule  Commonly known as: OMNICEF     cetirizine 10 MG tablet  Commonly known as: ZyrTEC Allergy  Take 1 tablet by mouth daily     Entresto 49-51 MG per tablet  Generic drug: sacubitril-valsartan     Lancets Misc  Use 2 times daily     metFORMIN 500 MG extended release tablet  Commonly known as: GLUCOPHAGE-XR  take 2 tablets by mouth twice a day     metOLazone 5 MG tablet  Commonly known as: ZAROXOLYN     metoprolol succinate 50 MG extended release tablet  Commonly known as: TOPROL XL     Pacerone 400 MG tablet  Generic drug: amiodarone     TYLENOL PO                Medications marked \"taking\" at this time  Outpatient Medications Marked as Taking for the 9/19/22 encounter (Office Visit) with Katharina Boo KLAUDIA Figueroa - CNP   Medication Sig Dispense Refill    cefdinir (OMNICEF) 300 MG capsule take 1 capsule by mouth every 12 hours      metOLazone (ZAROXOLYN) 5 MG tablet Take 5 mg by mouth daily      bumetanide (BUMEX) 2 MG tablet Take 2 mg by mouth 4 times daily      PACERONE 400 MG tablet 2 times daily      ENTRESTO 49-51 MG per tablet take 1 tablet by mouth twice a day      potassium chloride (KLOR-CON M) 20 MEQ extended release tablet Take 1 tablet by mouth daily (Patient taking differently: Take 20 mEq by mouth 2 times daily PATIENT REPORTS TAKING 40 MEQ TWO TIMES DAILY) 60 tablet 1    cetirizine (ZYRTEC ALLERGY) 10 MG tablet Take 1 tablet by mouth daily 30 tablet 5    aspirin 81 MG tablet Take 81 mg by mouth daily Patient now has 81 mg aspirin      Acetaminophen (TYLENOL PO) Take  by mouth as needed. Medications patient taking as of now reconciled against medications ordered at time of hospital discharge: Yes    A comprehensive review of systems was negative except for what was noted in the HPI.     Objective:    /60 (Site: Right Upper Arm, Position: Sitting)   Pulse 74   Wt 298 lb (135.2 kg)   SpO2 95%   BMI 41.33 kg/m²   General Appearance: alert and oriented to person, place and time, well developed and well- nourished, in no acute distress  Skin: warm and dry, no rash or erythema  Head: normocephalic and atraumatic  Eyes: pupils equal, round, and reactive to light, extraocular eye movements intact, conjunctivae normal  ENT: tympanic membrane, external ear and ear canal normal bilaterally, nose without deformity, nasal mucosa and turbinates normal without polyps  Neck: supple and non-tender without mass, no thyromegaly or thyroid nodules, no cervical lymphadenopathy  Pulmonary/Chest: clear to auscultation bilaterally- no wheezes, rales or rhonchi, normal air movement, no respiratory distress  Cardiovascular: normal rate, regular rhythm, normal S1 and S2, no murmurs, rubs, clicks, or gallops, distal pulses intact, no carotid bruits  Abdomen: soft, non-tender, non-distended, normal bowel sounds, no masses or organomegaly  Extremities: no cyanosis, clubbing. 1+ pitting edema bilateral lower legs   Musculoskeletal: normal range of motion, no joint swelling, deformity or tenderness  Neurologic: reflexes normal and symmetric, no cranial nerve deficit, gait, coordination and speech normal    An electronic signature was used to authenticate this note.   --Migue Amezquita, KLAUDIA - CNP

## 2022-09-23 LAB
ANION GAP SERPL CALCULATED.3IONS-SCNC: 14.4 MMOL/L
BUN BLDV-MCNC: 40 MG/DL (ref 9–20)
CALCIUM SERPL-MCNC: 9.3 MG/DL (ref 8.4–10.2)
CHLORIDE BLD-SCNC: 92 MMOL/L (ref 98–120)
CO2: 33 MMOL/L (ref 22–31)
CREAT SERPL-MCNC: 1.4 MG/DL (ref 0.7–1.3)
GFR CALCULATED: 58
GLUCOSE: 125 MG/DL (ref 75–110)
POTASSIUM SERPL-SCNC: 4.4 MMOL/L (ref 3.6–5)
SODIUM BLD-SCNC: 135 MMOL/L (ref 135–145)

## 2022-09-27 ENCOUNTER — TELEPHONE (OUTPATIENT)
Dept: FAMILY MEDICINE CLINIC | Age: 47
End: 2022-09-27

## 2022-09-28 ENCOUNTER — TELEPHONE (OUTPATIENT)
Dept: FAMILY MEDICINE CLINIC | Age: 47
End: 2022-09-28

## 2022-10-13 ENCOUNTER — HOSPITAL ENCOUNTER (OUTPATIENT)
Dept: CARDIAC CATH/INVASIVE PROCEDURES | Age: 47
Discharge: HOME | End: 2022-10-13
Attending: INTERNAL MEDICINE | Admitting: INTERNAL MEDICINE
Payer: COMMERCIAL

## 2022-10-13 ENCOUNTER — APPOINTMENT (OUTPATIENT)
Dept: GENERAL RADIOLOGY | Age: 47
End: 2022-10-13
Attending: INTERNAL MEDICINE
Payer: COMMERCIAL

## 2022-10-13 VITALS
SYSTOLIC BLOOD PRESSURE: 96 MMHG | HEIGHT: 71 IN | OXYGEN SATURATION: 98 % | HEART RATE: 78 BPM | DIASTOLIC BLOOD PRESSURE: 59 MMHG | TEMPERATURE: 97.5 F | RESPIRATION RATE: 20 BRPM | WEIGHT: 250 LBS | BODY MASS INDEX: 35 KG/M2

## 2022-10-13 LAB
ANION GAP SERPL CALCULATED.3IONS-SCNC: 13 MMOL/L (ref 9–17)
BUN BLDV-MCNC: 53 MG/DL (ref 6–20)
BUN/CREAT BLD: 43 (ref 9–20)
CALCIUM SERPL-MCNC: 9.6 MG/DL (ref 8.6–10.4)
CHLORIDE BLD-SCNC: 93 MMOL/L (ref 98–107)
CO2: 26 MMOL/L (ref 20–31)
CREAT SERPL-MCNC: 1.24 MG/DL (ref 0.7–1.2)
GFR SERPL CREATININE-BSD FRML MDRD: >60 ML/MIN/1.73M2
GLUCOSE BLD-MCNC: 135 MG/DL (ref 70–99)
HCT VFR BLD CALC: 45 % (ref 40.7–50.3)
HEMOGLOBIN: 14.5 G/DL (ref 13–17)
MCH RBC QN AUTO: 26.5 PG (ref 25.2–33.5)
MCHC RBC AUTO-ENTMCNC: 32.2 G/DL (ref 28.4–34.8)
MCV RBC AUTO: 82.3 FL (ref 82.6–102.9)
NRBC AUTOMATED: 0 PER 100 WBC
PDW BLD-RTO: 17.2 % (ref 11.8–14.4)
PLATELET # BLD: 235 K/UL (ref 138–453)
PMV BLD AUTO: 11 FL (ref 8.1–13.5)
POTASSIUM SERPL-SCNC: 4.1 MMOL/L (ref 3.7–5.3)
RBC # BLD: 5.47 M/UL (ref 4.21–5.77)
SODIUM BLD-SCNC: 132 MMOL/L (ref 135–144)
WBC # BLD: 6.8 K/UL (ref 3.5–11.3)

## 2022-10-13 PROCEDURE — 80048 BASIC METABOLIC PNL TOTAL CA: CPT

## 2022-10-13 PROCEDURE — C1777 LEAD, AICD, ENDO SINGLE COIL: HCPCS

## 2022-10-13 PROCEDURE — 6360000002 HC RX W HCPCS

## 2022-10-13 PROCEDURE — 71045 X-RAY EXAM CHEST 1 VIEW: CPT

## 2022-10-13 PROCEDURE — 93005 ELECTROCARDIOGRAM TRACING: CPT | Performed by: INTERNAL MEDICINE

## 2022-10-13 PROCEDURE — 85027 COMPLETE CBC AUTOMATED: CPT

## 2022-10-13 PROCEDURE — C1722 AICD, SINGLE CHAMBER: HCPCS

## 2022-10-13 PROCEDURE — 2500000003 HC RX 250 WO HCPCS

## 2022-10-13 PROCEDURE — 33249 INSJ/RPLCMT DEFIB W/LEAD(S): CPT

## 2022-10-13 PROCEDURE — 2580000003 HC RX 258: Performed by: INTERNAL MEDICINE

## 2022-10-13 RX ORDER — SODIUM CHLORIDE 9 MG/ML
INJECTION, SOLUTION INTRAVENOUS CONTINUOUS
Status: DISCONTINUED | OUTPATIENT
Start: 2022-10-13 | End: 2022-10-13 | Stop reason: HOSPADM

## 2022-10-13 RX ORDER — SODIUM CHLORIDE 9 MG/ML
INJECTION, SOLUTION INTRAVENOUS PRN
Status: DISCONTINUED | OUTPATIENT
Start: 2022-10-13 | End: 2022-10-13 | Stop reason: HOSPADM

## 2022-10-13 RX ORDER — SODIUM CHLORIDE 0.9 % (FLUSH) 0.9 %
5-40 SYRINGE (ML) INJECTION EVERY 12 HOURS SCHEDULED
Status: DISCONTINUED | OUTPATIENT
Start: 2022-10-13 | End: 2022-10-13 | Stop reason: HOSPADM

## 2022-10-13 RX ORDER — SODIUM CHLORIDE 0.9 % (FLUSH) 0.9 %
5-40 SYRINGE (ML) INJECTION PRN
Status: DISCONTINUED | OUTPATIENT
Start: 2022-10-13 | End: 2022-10-13 | Stop reason: HOSPADM

## 2022-10-13 RX ADMIN — SODIUM CHLORIDE: 9 INJECTION, SOLUTION INTRAVENOUS at 12:08

## 2022-10-13 ASSESSMENT — PAIN - FUNCTIONAL ASSESSMENT: PAIN_FUNCTIONAL_ASSESSMENT: 0-10

## 2022-10-13 NOTE — H&P
[facility]   Admission Note                 Date:   10/13/2022  Patient name: Renata Samuels  Date of admission:  10/13/2022 11:36 AM  MRN:   [id number]  YOB: 1975    Reason for Admission:  Non ischemic cardiomyopathy    CHIEF COMPLAINT:  CHF II   [ x ] Please see the original hard copy H&P by [facility] from  [facility]  in the short chart . [  ]  No changes in the information from the time of evaluation    [   ]  The following changes happened since that H&P    Medical problems:    CHF class II  Cardiomyopathy, EF 20%, non ischemic. Normal heart cath  Obstructive sleep apnea. Past Medical History:   has a past medical history of Abnormal stress test, CHF (congestive heart failure) ([address] Depression with anxiety, Glucose intolerance (impaired glucose tolerance), Hypertension, Hypokalemia, Insomnia, Lower extremity edema, Morbid obesity ([address] Obesity, KADE (obstructive sleep apnea), Pneumonia, Sinusitis, chronic, Tobacco abuse, Unspecified sleep apnea, Varicose vein, Venous insufficiency, and Ventricular tachycardia, nonsustained. Past Surgical History:   has a past surgical history that includes Cardiac catheterization (11/22/2017). Home Medications:    Prior to Admission medications    Medication Sig Start Date End Date Taking?  Authorizing Provider   metOLazone (ZAROXOLYN) 5 MG tablet Take 5 mg by mouth daily 9/15/22   Historical Provider, MD   bumetanide (BUMEX) 2 MG tablet Take 2 tablets by mouth 2 times daily 9/19/22   Yady Pretty APRN - CNP   metoprolol succinate (TOPROL XL) 50 MG extended release tablet  6/11/22   Historical Provider, MD   PACERONE 400 MG tablet 2 times daily 6/10/22   Historical Provider, MD   ENTRESTO 49-51 MG per tablet take 1 tablet by mouth twice a day 1/12/22   Historical Provider, MD   metFORMIN (GLUCOPHAGE-XR) 500 MG extended release tablet take 2 tablets by mouth twice a day 8/23/21   Jonny Beard MD   potassium chloride (KLOR-CON M) 20 MEQ extended release tablet Take 1 tablet by mouth daily  Patient taking differently: Take 20 mEq by mouth 2 times daily PATIENT REPORTS TAKING 40 MEQ TWO TIMES DAILY 7/13/21   Brant Goodwin MD   cetirizine (ZYRTEC ALLERGY) 10 MG tablet Take 1 tablet by mouth daily 1/19/21   KLAUDIA Villalba CNP   blood glucose monitor kit and supplies Test 2 times a day & as needed for symptoms of irregular blood glucose. 4/24/19   KLAUDIA Villalba CNP   blood glucose monitor strips Use 2 times daily to test sugars 4/24/19   KLAUDIA Villalba CNP   Lancets MISC Use 2 times daily 4/24/19   KLAUDIA Villalba CNP   aspirin 81 MG tablet Take 81 mg by mouth daily Patient now has 81 mg aspirin    Historical Provider, MD   Acetaminophen (TYLENOL PO) Take  by mouth as needed. Historical Provider, MD       Allergies:  Patient has no known allergies. Social History:   reports that he has been smoking cigarettes. He has a 19.00 pack-year smoking history. He has never used smokeless tobacco. He reports current alcohol use. He reports that he does not use drugs. Family History:   Positive for early CAD    REVIEW OF SYSTEMS:    As above or non contributor    PHYSICAL EXAM:    As above in office note      Labs:     CBC:   Recent Labs     10/13/22  1208   WBC 6.8   HGB 14.5   HCT 45.0        BMP:   Recent Labs     10/13/22  1208   *   K 4.1   CO2 26   BUN 53*   CREATININE 1.24*   LABGLOM >60   GLUCOSE 135*     BNP: No results for input(s): BNP in the last 72 hours. PT/INR: No results for input(s): PROTIME, INR in the last 72 hours. APTT:No results for input(s): APTT in the last 72 hours. CARDIAC ENZYMES:No results for input(s): CKTOTAL, CKMB, CKMBINDEX, TROPONINI in the last 72 hours.   FASTING LIPID PANEL:  Lab Results   Component Value Date/Time    LDLCALC 125.0 01/10/2018 10:58 AM    TRIG 155 01/10/2018 10:58 AM     LIVER PROFILE:No results for input(s): AST, ALT, LABALBU in the last 72 hours.      IMPRESSION:    Non ischemic cardiomyopathy    Patient Active Problem List   Diagnosis    Insomnia    Sinusitis, chronic    Venous insufficiency    Varicose vein    Morbid obesity due to excess calories (HCC)    KADE on CPAP    Glucose intolerance (impaired glucose tolerance)    Tobacco abuse    Depression with anxiety    Impaired fasting glucose    Systolic CHF with reduced left ventricular function, NYHA class 3 ([address]    Essential (primary) hypertension    Diabetes mellitus, type II (HCC)    Heart failure, unspecified (HCC)    Acute on chronic systolic congestive heart failure (HCC)    Mixed hyperlipidemia    Nonischemic cardiomyopathy (HCC)    PVC (premature ventricular contraction)    Exertional shortness of breath    Coughing    Bilateral leg edema       RECOMMENDATIONS:  AICD single chamber    Discussed with patient and nursing.       [ x  ] I personally reviewed and examined the patient to confirm the above information    Electronically signed by Yenifer Rodriguez MD on 10/13/2022 at 0 [facility]        [phone]

## 2022-10-13 NOTE — PROGRESS NOTES
Discharge Note:      All discharge instructions given at this time as well as all patient belongings returned to patient. Pt denies any further questions regarding discharge at this time. Pt given also given discharge packet with unit letter, discharge instructions/restrictions and medication handouts regarding all discharge medications and side effects. Pt discharged with arm in sling. Pt denies any further issues at this time. Pt ambulated out to front discharge doors at this time. Pt left premises without any issues in private vehicle at this time.

## 2022-10-13 NOTE — OP NOTE
[facility]                Procedure Note  Single Chamber ICD           Mitchell Stout (12 y.o., male)  1975      10/13/2022      Procedure: AICD    Operators:  Primary:   Assistant/ CV Fellow: Indication: Non ischemic cardiomyopathy    Pre Procedure Conscious Sedation Data:    ASA Class:    [] I [x] II [] III [] IV    Mallampati Class:  [] I [x] II [] III [] IV     AICD / CRT Indication: Pre procedure review    Documentation to support the medical necessity f procedure. Reason for placement:     [x] Initial [] Recall/Malfunction  [] Upgrade  [] Beyond useful life limit    EF measured by:   [x] Echo [] Stress Test  [] Muga  [x] Angiography    For Secondary prevention - Complete this section. A.   [] Documented episodes of cardiac arrest due to V.Fib, not due to transient reversible causes, with no irreversible brain damage   (NCD Covered indication I)  [] Documented sustained VT, either spontaneous or induced by EP study, not associated with an acute MI and notdue to a transient or reversible cause; and patient does not have irreversible brain damage from pre existing cerebral disease. (NCD Covered Insication 2). Docment as  []  Ischemic. [] Non Ischemic. For Primary prevention Patients - Complete Section B & C. Section B.  [] Documented familial or inherited conditions with a high risk of life threatening VT (Long QT, HOC). (Cpovered indication 3)  [] Coronary artery disease with documented prior MI annd LVEF < 35%, and inducivble sustained VT or VF at EP study. (NCD Covered Indication 4). [] Documented prior MI and LVEF < 30%. (NCD Covered Indication 5)  [] Ischemic dilated CM (IDCM), documented prior MI, NYHA class II/III heart failure, and EF < 35%. (NCD Covered Indication 6). [x] NIDCM > 3 months, NYHA class II/III heart failure, and LVEF < 35%   (NCD Covered Indication 7 & 9).   [] Documented prior MI and NYHA class IV heart failure and meets all current medicare coverage for CRT device. Section C. (if one or more box is checked, then patient does not meet NCD coverage requirements for primary prevention)  [] NYHA class IV (Covered Indications 4 and 8)  [] Cardiogenic shock or symptomatic hypotension while in a stable baseline rhythm. [] CABG or PTCA within past 3 months  [] Acute MI within past 40 days. [] Patient is unable to give informed consent. [] Symptoms / Findings making them a candidate for coronary revascularization. [] Irreversible brain damage from pre existing cerebral disease.  [] Any disease, other than cardiac disease, with likely survival less than 1 year.  / Venuetastic Data:        Name    [employer] X   [address] [place] [address] # Serial #   Pacer/ICD/Bi-V [id number]   RV-Lead [id number]     All leads test and program parameters documented in chart      [x] Sedation monitoring  [] Left Subclavian Angiogram   [x] RV lead   [] RA lead   [] LV lead   [x] Intra - OP Lead Testing  [] Coronary Sinus Angiogram   [x] Pocket   [x] Generators Implant  [x] Fluoro time: 5 min      Procedure  After the usual preparation of the left neck and chest, the patient was draped in the usual sterile manner. Local anesthesia was infused below the left clavicle from the midline laterally. An incision was made inferior and parallel to the clavicle. The incision was carried down to the fascia. A pocket was formed inferior using blunt dissection. A thin walled 18 gauge needle was used to puncture the left subclavian vein using the modified Seldinger technique. A guide wire was passed into the right heart under fluoroscopic control. This was repeated with a second guide wire. RV Lead Implant:  A 9 Cuban sheath was then passed over the guide wire and the guide wire removed. The ventricular lead was advanced through the sheath into the right heart.  The lead was then positioned in the right ventricle under fluoroscopic control. The acute pacing and sensing thresholds were measured and found to be satisfactory. Generator: The implanted leads were attached to the pacemaker / AICD using the setscrews. The pocket was irrigated with antibiotic solution. The pulse generator and leads were coiled and placed in the pocket. Fluoroscopy was used to verify the final placement of the pacemaker and leads. The pocket was closed using multiple layers of suture and a dry sterile dressing was applied. There were no complications, patient tolerated the procedure well. The patient left the EP lab in stable condition.       Estimated Blood Loss:  [x] Minimal < 25 cc [] Moderate 25-50 cc  [] >50 cc      Impression / Device:  Successful Implantation of: AICd single chamber      Plan:  Telemetry monitoring  Interigate pacemaker before discharge  CXR if needed  Wound check at [facility] in 7days  Discharge if patient remains stable        Electronically signed by Agustin Castle MD on 10/13/2022 at [address] [place] [address] [facility]ant  [phone]

## 2022-10-13 NOTE — PROGRESS NOTES
Patient was admitted from cath lab via bed s/p 1 lead ICD pacemaker insertion. Patient attached to monitor and v/s obtained. Patient received 2 mg of versed, 50 mcg Fentanyl, and 2 gm Ancef. V/S are stable and patient has no complaints. Continue to monitor.

## 2022-10-13 NOTE — PROGRESS NOTES
Spoke with Casimiro Gallegos from [facility] on phone. ICD Pacemaker does not need to be interrigated per Dr Mirlande Conklin.

## 2022-10-14 LAB
EKG ATRIAL RATE: 77 BPM
EKG P AXIS: 54 DEGREES
EKG P-R INTERVAL: 238 MS
EKG Q-T INTERVAL: 522 MS
EKG QRS DURATION: 208 MS
EKG QTC CALCULATION (BAZETT): 590 MS
EKG R AXIS: -33 DEGREES
EKG T AXIS: 89 DEGREES
EKG VENTRICULAR RATE: 77 BPM

## 2022-10-14 PROCEDURE — 93010 ELECTROCARDIOGRAM REPORT: CPT | Performed by: INTERNAL MEDICINE

## 2022-10-17 ENCOUNTER — TELEPHONE (OUTPATIENT)
Dept: FAMILY MEDICINE CLINIC | Age: 47
End: 2022-10-17

## 2022-10-17 NOTE — TELEPHONE ENCOUNTER
[facility] Transitions Initial Follow Up Call    Outreach made within 2 business days of discharge: Yes    Patient: Katy Muse Patient : 1975   MRN: [id number]  Reason for Admission: There are no discharge diagnoses documented for the most recent discharge. Discharge Date: 10/13/22       Spoke with: Patient    Discharge department/facility: [facility] Interactive Patient Contact:  Was patient able to fill all prescriptions: Yes  Was patient instructed to bring all medications to the follow-up visit: Yes  Is patient taking all medications as directed in the discharge summary?  Yes  Does patient understand their discharge instructions: Yes  Does patient have questions or concerns that need addressed prior to 7-14 day follow up office visit: no    Scheduled appointment with Cardiology within 7-14 days    Follow Up  Future Appointments   Date Time Provider Najma Person   2022  9:30 AM KLAUDIA John - CNP [facility]       Cesar Hercules LPN

## 2022-11-16 RX ORDER — BUMETANIDE 2 MG/1
4 TABLET ORAL 2 TIMES DAILY
Qty: 90 TABLET | Refills: 1 | Status: SHIPPED | OUTPATIENT
Start: 2022-11-16

## 2022-11-16 NOTE — TELEPHONE ENCOUNTER
Margot Simon is requesting a refill on the following medication(s):  Requested Prescriptions     Pending Prescriptions Disp Refills    bumetanide (BUMEX) 2 MG tablet 90 tablet 1     Sig: Take 2 tablets by mouth 2 times daily       Last Visit Date (If Applicable):  6/39/1255    Next Visit Date:    11/23/2022

## 2023-01-12 ENCOUNTER — OFFICE VISIT (OUTPATIENT)
Dept: FAMILY MEDICINE CLINIC | Age: 48
End: 2023-01-12
Payer: COMMERCIAL

## 2023-01-12 VITALS
HEART RATE: 68 BPM | SYSTOLIC BLOOD PRESSURE: 114 MMHG | OXYGEN SATURATION: 96 % | WEIGHT: 282 LBS | BODY MASS INDEX: 39.33 KG/M2 | DIASTOLIC BLOOD PRESSURE: 66 MMHG

## 2023-01-12 DIAGNOSIS — I42.0 DILATED CARDIOMYOPATHY (HCC): ICD-10-CM

## 2023-01-12 DIAGNOSIS — E11.69 TYPE 2 DIABETES MELLITUS WITH OTHER SPECIFIED COMPLICATION, WITHOUT LONG-TERM CURRENT USE OF INSULIN (HCC): Primary | ICD-10-CM

## 2023-01-12 DIAGNOSIS — E11.9 TYPE 2 DIABETES MELLITUS WITHOUT COMPLICATION, WITHOUT LONG-TERM CURRENT USE OF INSULIN (HCC): ICD-10-CM

## 2023-01-12 DIAGNOSIS — I42.8 NON-ISCHEMIC CARDIOMYOPATHY (HCC): ICD-10-CM

## 2023-01-12 DIAGNOSIS — R60.0 BILATERAL LEG EDEMA: ICD-10-CM

## 2023-01-12 DIAGNOSIS — I50.22 CHRONIC SYSTOLIC CONGESTIVE HEART FAILURE (HCC): ICD-10-CM

## 2023-01-12 LAB — HBA1C MFR BLD: 5.9 %

## 2023-01-12 PROCEDURE — 3078F DIAST BP <80 MM HG: CPT

## 2023-01-12 PROCEDURE — 3074F SYST BP LT 130 MM HG: CPT

## 2023-01-12 PROCEDURE — 3044F HG A1C LEVEL LT 7.0%: CPT

## 2023-01-12 PROCEDURE — 83036 HEMOGLOBIN GLYCOSYLATED A1C: CPT

## 2023-01-12 PROCEDURE — 99214 OFFICE O/P EST MOD 30 MIN: CPT

## 2023-01-12 RX ORDER — POTASSIUM CHLORIDE 20 MEQ/1
20 TABLET, EXTENDED RELEASE ORAL DAILY
Qty: 60 TABLET | Refills: 1 | Status: SHIPPED | OUTPATIENT
Start: 2023-01-12

## 2023-01-12 RX ORDER — METFORMIN HYDROCHLORIDE 500 MG/1
TABLET, EXTENDED RELEASE ORAL
Qty: 120 TABLET | Refills: 2 | Status: SHIPPED | OUTPATIENT
Start: 2023-01-12

## 2023-01-12 ASSESSMENT — ENCOUNTER SYMPTOMS
CHEST TIGHTNESS: 0
EYE DISCHARGE: 0
DIARRHEA: 0
CONSTIPATION: 0
ABDOMINAL PAIN: 0
NAUSEA: 0
SHORTNESS OF BREATH: 0
WHEEZING: 0
RHINORRHEA: 0
EYE ITCHING: 0
COUGH: 0
BACK PAIN: 0
COLOR CHANGE: 0
SINUS PAIN: 0
SINUS PRESSURE: 0

## 2023-01-12 NOTE — PROGRESS NOTES
Silvia Ulloa (:  1975) is a 56 y.o. male,Established patient, here for evaluation of the following chief complaint(s):  Diabetes (Patient is here today to follow up for diabetes. )         ASSESSMENT/PLAN:  1. Type 2 diabetes mellitus with other specified complication, without long-term current use of insulin (HCC)  Assessment & Plan:  Tab At goal, continue current medications and continue current treatment plan A1c today is 5.9. This is in goal range, continue metformin  mg extended release tablets at's twice daily with meals. Continue diet low in saturated fats as well as low carbs. Increase exercise as tolerated 30 minutes a day 150 minutes a week would be the goal.  Orders:  -     POCT Hb A1C (glycosylated hemoglobin)  -     potassium chloride (KLOR-CON M) 20 MEQ extended release tablet; Take 1 tablet by mouth daily, Disp-60 tablet, R-1Normal  2. Type 2 diabetes mellitus without complication, without long-term current use of insulin (HCC)  Assessment & Plan:  Tab At goal, continue current medications and continue current treatment plan A1c today is 5.9. This is in goal range, continue metformin  mg extended release tablets at's twice daily with meals. Continue diet low in saturated fats as well as low carbs. Increase exercise as tolerated 30 minutes a day 150 minutes a week would be the goal.  Orders:  -     metFORMIN (GLUCOPHAGE-XR) 500 MG extended release tablet; take 2 tablets by mouth twice a day, Disp-120 tablet, R-2Normal  -     potassium chloride (KLOR-CON M) 20 MEQ extended release tablet; Take 1 tablet by mouth daily, Disp-60 tablet, R-1Normal  3. Dilated cardiomyopathy (HCC)  -     potassium chloride (KLOR-CON M) 20 MEQ extended release tablet; Take 1 tablet by mouth daily, Disp-60 tablet, R-1Normal  4. Non-ischemic cardiomyopathy (HCC)  -     potassium chloride (KLOR-CON M) 20 MEQ extended release tablet; Take 1 tablet by mouth daily, Disp-60 tablet, R-1Normal  5.  Bilateral leg edema  Assessment & Plan:   At goal, continue current medications and continue current treatment plan continue Bumex 2 mg tablets take 2 tablets twice daily. Continue wearing compression stockings as well as elevating feet. Orders:  -     potassium chloride (KLOR-CON M) 20 MEQ extended release tablet; Take 1 tablet by mouth daily, Disp-60 tablet, R-1Normal  6. Smoker  -     potassium chloride (KLOR-CON M) 20 MEQ extended release tablet; Take 1 tablet by mouth daily, Disp-60 tablet, R-1Normal  7. Obstructive sleep apnea syndrome  -     potassium chloride (KLOR-CON M) 20 MEQ extended release tablet; Take 1 tablet by mouth daily, Disp-60 tablet, R-1Normal  8. Chronic systolic congestive heart failure (HCC)  -     potassium chloride (KLOR-CON M) 20 MEQ extended release tablet; Take 1 tablet by mouth daily, Disp-60 tablet, R-1Normal  9. KADE on CPAP  -     potassium chloride (KLOR-CON M) 20 MEQ extended release tablet; Take 1 tablet by mouth daily, Disp-60 tablet, R-1Normal      Return in about 6 months (around 7/12/2023), or if symptoms worsen or fail to improve. Subjective   SUBJECTIVE/OBJECTIVE:  Etienne Camacho is here today for a follow-up visit. He does have type 2 diabetes mellitus is on metformin XR 1000 twice daily. A1c 5.9 today within goal range, denies any side effects this medication. He does have history of CHF and had to been diuresed in the hospital setting, subsequently he has implanted AICD pacemaker. This happened in October, subsequently he is feeling much better, denies as much shortness of breath, is able to lay down flat now. Is on Bumex, and doing well. He is still on fluid restrictions, does notice differences in edema worsening throughout the day however at night it is almost completely resolved by morning. We will continue him on potassium supplementation, continue Bumex at current dose, continue to follow-up with cardiology for dilated cardiomyopathy and CHF.   He denies any further needs at this time, will follow-up as directed or sooner if needed. Review of Systems   Constitutional:  Negative for chills, fatigue, fever and unexpected weight change. HENT:  Negative for congestion, ear pain, rhinorrhea, sinus pressure and sinus pain. Eyes:  Negative for discharge, itching and visual disturbance. Respiratory:  Negative for cough, chest tightness, shortness of breath and wheezing. Cardiovascular:  Negative for chest pain, palpitations and leg swelling. Gastrointestinal:  Negative for abdominal pain, constipation, diarrhea and nausea. Endocrine: Negative for cold intolerance, heat intolerance, polydipsia and polyphagia. Genitourinary:  Negative for dysuria, flank pain and frequency. Musculoskeletal:  Negative for arthralgias, back pain and myalgias. Skin:  Negative for color change. Allergic/Immunologic: Negative for environmental allergies and food allergies. Neurological:  Negative for dizziness, weakness, light-headedness, numbness and headaches. Psychiatric/Behavioral:  Negative for agitation, behavioral problems and confusion. The patient is not nervous/anxious. Objective   Physical Exam  Vitals and nursing note reviewed. Constitutional:       General: He is not in acute distress. Appearance: Normal appearance. He is not ill-appearing. HENT:      Head: Normocephalic and atraumatic. Right Ear: Tympanic membrane and external ear normal.      Left Ear: Tympanic membrane and external ear normal.      Nose: Nose normal. No congestion or rhinorrhea. Mouth/Throat:      Mouth: Mucous membranes are moist.      Pharynx: Oropharynx is clear. No posterior oropharyngeal erythema. Eyes:      Pupils: Pupils are equal, round, and reactive to light. Cardiovascular:      Rate and Rhythm: Normal rate and regular rhythm. Pulses: Normal pulses. Heart sounds: Normal heart sounds.    Pulmonary:      Effort: Pulmonary effort is normal.      Breath sounds: Normal breath sounds. No wheezing or rhonchi. Abdominal:      General: Bowel sounds are normal.      Palpations: There is no mass. Tenderness: There is no abdominal tenderness. Musculoskeletal:         General: No swelling or tenderness. Normal range of motion. Cervical back: Normal range of motion. No rigidity or tenderness. Skin:     General: Skin is warm and dry. Capillary Refill: Capillary refill takes less than 2 seconds. Coloration: Skin is not pale. Findings: No erythema. Neurological:      General: No focal deficit present. Mental Status: He is alert and oriented to person, place, and time. Psychiatric:         Mood and Affect: Mood normal.         Behavior: Behavior normal.         Thought Content: Thought content normal.         Judgment: Judgment normal.                An electronic signature was used to authenticate this note.     --Rigoberto Dominique, KLAUDIA - CNP

## 2023-01-12 NOTE — ASSESSMENT & PLAN NOTE
Tab At goal, continue current medications and continue current treatment plan A1c today is 5.9. This is in goal range, continue metformin  mg extended release tablets at's twice daily with meals. Continue diet low in saturated fats as well as low carbs.   Increase exercise as tolerated 30 minutes a day 150 minutes a week would be the goal.

## 2023-01-12 NOTE — ASSESSMENT & PLAN NOTE
At goal, continue current medications and continue current treatment plan continue Bumex 2 mg tablets take 2 tablets twice daily. Continue wearing compression stockings as well as elevating feet.

## 2023-02-02 ENCOUNTER — OFFICE VISIT (OUTPATIENT)
Dept: FAMILY MEDICINE CLINIC | Age: 48
End: 2023-02-02
Payer: COMMERCIAL

## 2023-02-02 VITALS
BODY MASS INDEX: 40.4 KG/M2 | TEMPERATURE: 97.9 F | WEIGHT: 289.7 LBS | HEART RATE: 74 BPM | SYSTOLIC BLOOD PRESSURE: 114 MMHG | DIASTOLIC BLOOD PRESSURE: 78 MMHG | OXYGEN SATURATION: 98 %

## 2023-02-02 DIAGNOSIS — J18.9 PNEUMONITIS: Primary | ICD-10-CM

## 2023-02-02 DIAGNOSIS — R05.1 ACUTE COUGH: ICD-10-CM

## 2023-02-02 LAB
INFLUENZA A ANTIGEN, POC: NEGATIVE
INFLUENZA B ANTIGEN, POC: NEGATIVE
LOT EXPIRE DATE: NORMAL
LOT KIT NUMBER: NORMAL
SARS-COV-2, POC: NORMAL
VALID INTERNAL CONTROL: NORMAL
VENDOR AND KIT NAME POC: NORMAL

## 2023-02-02 PROCEDURE — 87428 SARSCOV & INF VIR A&B AG IA: CPT | Performed by: NURSE PRACTITIONER

## 2023-02-02 PROCEDURE — 99212 OFFICE O/P EST SF 10 MIN: CPT | Performed by: NURSE PRACTITIONER

## 2023-02-02 PROCEDURE — 3074F SYST BP LT 130 MM HG: CPT | Performed by: NURSE PRACTITIONER

## 2023-02-02 PROCEDURE — 99213 OFFICE O/P EST LOW 20 MIN: CPT | Performed by: NURSE PRACTITIONER

## 2023-02-02 PROCEDURE — 3078F DIAST BP <80 MM HG: CPT | Performed by: NURSE PRACTITIONER

## 2023-02-02 RX ORDER — ALBUTEROL SULFATE 90 UG/1
2 AEROSOL, METERED RESPIRATORY (INHALATION) EVERY 4 HOURS PRN
Qty: 18 G | Refills: 0 | Status: SHIPPED | OUTPATIENT
Start: 2023-02-02

## 2023-02-02 RX ORDER — AZITHROMYCIN 250 MG/1
TABLET, FILM COATED ORAL
Qty: 1 PACKET | Refills: 0 | Status: SHIPPED | OUTPATIENT
Start: 2023-02-02 | End: 2023-02-07

## 2023-02-02 SDOH — ECONOMIC STABILITY: HOUSING INSECURITY
IN THE LAST 12 MONTHS, WAS THERE A TIME WHEN YOU DID NOT HAVE A STEADY PLACE TO SLEEP OR SLEPT IN A SHELTER (INCLUDING NOW)?: NO

## 2023-02-02 SDOH — ECONOMIC STABILITY: FOOD INSECURITY: WITHIN THE PAST 12 MONTHS, YOU WORRIED THAT YOUR FOOD WOULD RUN OUT BEFORE YOU GOT MONEY TO BUY MORE.: NEVER TRUE

## 2023-02-02 SDOH — ECONOMIC STABILITY: INCOME INSECURITY: HOW HARD IS IT FOR YOU TO PAY FOR THE VERY BASICS LIKE FOOD, HOUSING, MEDICAL CARE, AND HEATING?: NOT HARD AT ALL

## 2023-02-02 SDOH — ECONOMIC STABILITY: FOOD INSECURITY: WITHIN THE PAST 12 MONTHS, THE FOOD YOU BOUGHT JUST DIDN'T LAST AND YOU DIDN'T HAVE MONEY TO GET MORE.: NEVER TRUE

## 2023-02-02 ASSESSMENT — PATIENT HEALTH QUESTIONNAIRE - PHQ9
2. FEELING DOWN, DEPRESSED OR HOPELESS: 0
SUM OF ALL RESPONSES TO PHQ QUESTIONS 1-9: 0
10. IF YOU CHECKED OFF ANY PROBLEMS, HOW DIFFICULT HAVE THESE PROBLEMS MADE IT FOR YOU TO DO YOUR WORK, TAKE CARE OF THINGS AT HOME, OR GET ALONG WITH OTHER PEOPLE: 0
3. TROUBLE FALLING OR STAYING ASLEEP: 0
7. TROUBLE CONCENTRATING ON THINGS, SUCH AS READING THE NEWSPAPER OR WATCHING TELEVISION: 0
SUM OF ALL RESPONSES TO PHQ QUESTIONS 1-9: 0
SUM OF ALL RESPONSES TO PHQ QUESTIONS 1-9: 0
8. MOVING OR SPEAKING SO SLOWLY THAT OTHER PEOPLE COULD HAVE NOTICED. OR THE OPPOSITE, BEING SO FIGETY OR RESTLESS THAT YOU HAVE BEEN MOVING AROUND A LOT MORE THAN USUAL: 0
SUM OF ALL RESPONSES TO PHQ QUESTIONS 1-9: 0
4. FEELING TIRED OR HAVING LITTLE ENERGY: 0
1. LITTLE INTEREST OR PLEASURE IN DOING THINGS: 0
9. THOUGHTS THAT YOU WOULD BE BETTER OFF DEAD, OR OF HURTING YOURSELF: 0
5. POOR APPETITE OR OVEREATING: 0
6. FEELING BAD ABOUT YOURSELF - OR THAT YOU ARE A FAILURE OR HAVE LET YOURSELF OR YOUR FAMILY DOWN: 0
SUM OF ALL RESPONSES TO PHQ9 QUESTIONS 1 & 2: 0

## 2023-02-02 ASSESSMENT — ENCOUNTER SYMPTOMS
RHINORRHEA: 1
DIARRHEA: 0
NAUSEA: 0
SORE THROAT: 1
VOMITING: 0
WHEEZING: 1
COUGH: 1

## 2023-02-02 NOTE — PROGRESS NOTES
[address] [address] [address] [facility], APRN-[facility]  [address]  Phone:  [phone]  Fax:  [phone]  mEery Escalona is a 53 y.o. male who presents today for his medical conditions/complaints as noted below. Emery Escalnoa c/o of URI (Started about 3 days ago, feels better today. Congestion, when wakes up feels a rattle, )      HPI:     URI   This is a new problem. The current episode started in the past 7 days (3 or 4 days). There has been no fever. Associated symptoms include congestion (chest), coughing, ear pain (left), rhinorrhea, a sore throat and wheezing. Pertinent negatives include no diarrhea, headaches, nausea, rash or vomiting. Wt Readings from Last 3 Encounters:   02/02/23 289 lb 11.2 oz (131.4 kg)   01/12/23 282 lb (127.9 kg)   10/13/22 250 lb (113.4 kg)       Temp Readings from Last 3 Encounters:   02/02/23 97.9 °F (36.6 °C) (Tympanic)   10/13/22 97.5 °F (36.4 °C)   03/29/22 97.6 °F (36.4 °C)       BP Readings from Last 3 Encounters:   02/02/23 114/78   01/12/23 114/66   10/13/22 (!) 96/59       Pulse Readings from Last 3 Encounters:   02/02/23 74   01/12/23 68   10/13/22 78        SpO2 Readings from Last 3 Encounters:   02/02/23 98%   01/12/23 96%   10/13/22 98%             Past Medical History:   Diagnosis Date    Abnormal stress test 10/30/2017    CHF (congestive heart failure) ([facility] [address] 02/08/2018    Depression with anxiety     pt having hard time since quitting smoking March 2013. Glucose intolerance (impaired glucose tolerance)     Hypertension     Hypokalemia     Insomnia     Works 3rd shift. Lower extremity edema     Morbid obesity (HCC)     Obesity     KADE (obstructive sleep apnea)     Pneumonia     Sinusitis, chronic     Tobacco abuse     Unspecified sleep apnea     Suspected. Pt declines sleep study at this time.     Varicose vein     left leg,thigh region    Venous insufficiency     Ventricular tachycardia, nonsustained       Past Surgical History:   Procedure Laterality Date    CARDIAC CATHETERIZATION  11/22/2017     Family History   Problem Relation Age of Onset    Other Mother         posttraumatic stress disorder,battered wife syndrome    Cervical Cancer Mother     Other Father         Hepatitis C    Diabetes Maternal Grandfather     Other Daughter         adhd     Social History     Tobacco Use    Smoking status: Every Day     Packs/day: 1.00     Years: 19.00     Pack years: 19.00     Types: Cigarettes    Smokeless tobacco: Never    Tobacco comments:     Quit in March 2013   Substance Use Topics    Alcohol use: Yes     Comment: Rare      Current Outpatient Medications   Medication Sig Dispense Refill    azithromycin (ZITHROMAX Z-MACKENZIE) 250 MG tablet Two pills daily first day, then one pill daily for 4 days. Take with food. 1 packet 0    albuterol sulfate HFA (PROVENTIL HFA) 108 (90 Base) MCG/ACT inhaler Inhale 2 puffs into the lungs every 4 hours as needed for Wheezing or Shortness of Breath (cough) Provide what insurance will cover. 18 g 0    metFORMIN (GLUCOPHAGE-XR) 500 MG extended release tablet take 2 tablets by mouth twice a day 120 tablet 2    potassium chloride (KLOR-CON M) 20 MEQ extended release tablet Take 1 tablet by mouth daily 60 tablet 1    bumetanide (BUMEX) 2 MG tablet Take 2 tablets by mouth 2 times daily 90 tablet 1    PACERONE 400 MG tablet 2 times daily      ENTRESTO 49-51 MG per tablet take 1 tablet by mouth twice a day      cetirizine (ZYRTEC ALLERGY) 10 MG tablet Take 1 tablet by mouth daily 30 tablet 5    blood glucose monitor kit and supplies Test 2 times a day & as needed for symptoms of irregular blood glucose. 1 kit 0    blood glucose monitor strips Use 2 times daily to test sugars 100 strip 5    Lancets MISC Use 2 times daily 100 each 5    aspirin 81 MG tablet Take 81 mg by mouth daily Patient now has 81 mg aspirin      Acetaminophen (TYLENOL PO) Take  by mouth as needed.        No current facility-administered medications for this visit. No Known Allergies    No results found. Subjective:      Review of Systems   Constitutional:  Negative for chills, diaphoresis, fatigue and fever. HENT:  Positive for congestion (chest), ear pain (left), rhinorrhea and sore throat. Respiratory:  Positive for cough and wheezing. Gastrointestinal:  Negative for diarrhea, nausea and vomiting. Musculoskeletal:  Negative for arthralgias and myalgias. Skin:  Negative for rash. Neurological:  Negative for headaches. Objective:     /78 (Site: Right Upper Arm, Position: Sitting, Cuff Size: Medium Adult)   Pulse 74   Temp 97.9 °F (36.6 °C) (Tympanic)   Wt 289 lb 11.2 oz (131.4 kg)   SpO2 98%   BMI 40.40 kg/m²     Physical Exam  Constitutional:       General: He is not in acute distress. Appearance: He is well-developed. He is not ill-appearing, toxic-appearing or diaphoretic. HENT:      Head: Normocephalic. Right Ear: Tympanic membrane, ear canal and external ear normal.      Left Ear: Tympanic membrane, ear canal and external ear normal.      Nose: Nose normal. No mucosal edema, congestion or rhinorrhea. Mouth/Throat:      Mouth: Mucous membranes are moist. Mucous membranes are not pale and not dry. Pharynx: Oropharynx is clear. Eyes:      General: Lids are normal. No scleral icterus. Right eye: No discharge. Left eye: No discharge. Extraocular Movements:      Right eye: No nystagmus. Left eye: No nystagmus. Conjunctiva/sclera: Conjunctivae normal.   Neck:      Trachea: Trachea normal.   Cardiovascular:      Rate and Rhythm: Normal rate and regular rhythm. Pulses: Normal pulses. Heart sounds: Normal heart sounds. Pulmonary:      Effort: Pulmonary effort is normal. No accessory muscle usage or respiratory distress. Breath sounds: Wheezing and rales present. Musculoskeletal:         General: Normal range of motion.       Cervical back: Full passive range of motion without pain and normal range of motion. Skin:     General: Skin is warm and dry. Capillary Refill: Capillary refill takes less than 2 seconds. Coloration: Skin is not pale. Neurological:      Mental Status: He is alert and oriented to person, place, and time. Psychiatric:         Mood and Affect: Mood normal.         Speech: Speech normal.         Behavior: Behavior normal.         Thought Content: Thought content normal.         Judgment: Judgment normal.       Assessment:      Diagnosis Orders   1. Pneumonitis  azithromycin (ZITHROMAX Z-MACKENZIE) 250 MG tablet    albuterol sulfate HFA (PROVENTIL HFA) 108 (90 Base) MCG/ACT inhaler      2. Acute cough  POCT COVID-19 & Influenza A/B        No results found for this visit on 02/02/23. Plan:         Azithromycin as directed for infection. Albuterol 2 puffs every 4 to 6 hours as needed for cough, wheeze or shortness of breath. Off work note for Thursday and Friday. Follow up with primary care provider in 1 to 2 days if needed. Patient Instructions   Azithromycin as directed for infection. Albuterol 2 puffs every 4 to 6 hours as needed for cough, wheeze or shortness of breath. Off work note for Thursday and Friday. Follow up with primary care provider in 1 to 2 days if needed. Patient/Caregiver instructed on use, benefit, and side effects of prescribed medications. All patient/parent/caregiver questions answered. Patient/parent/caregiver voiced understanding. Reviewed health maintenance. Instructed to continue current medications, diet and exercise. Patient agreed with treatment plan. Follow up as directed.            Electronically signed by KLAUDIA Freitas NP on2/2/2023

## 2023-02-02 NOTE — PATIENT INSTRUCTIONS
Azithromycin as directed for infection. Albuterol 2 puffs every 4 to 6 hours as needed for cough, wheeze or shortness of breath. Off work note for Thursday and Friday. Follow up with primary care provider in 1 to 2 days if needed.

## 2023-04-16 DIAGNOSIS — E11.9 TYPE 2 DIABETES MELLITUS WITHOUT COMPLICATION, WITHOUT LONG-TERM CURRENT USE OF INSULIN (HCC): ICD-10-CM

## 2023-04-17 RX ORDER — METFORMIN HYDROCHLORIDE 500 MG/1
TABLET, EXTENDED RELEASE ORAL
Qty: 120 TABLET | Refills: 2 | Status: SHIPPED | OUTPATIENT
Start: 2023-04-17

## 2023-04-17 NOTE — TELEPHONE ENCOUNTER
Sada Johnston is requesting a refill on the following medication(s):  Requested Prescriptions     Pending Prescriptions Disp Refills    metFORMIN (GLUCOPHAGE-XR) 500 MG extended release tablet [Pharmacy Med Name: METFORMIN HCL  MG TABLET] 120 tablet 2     Sig: take 2 tablets by mouth twice a day       Last Visit Date (If Applicable):  2/45/2021    Next Visit Date:    7/12/2023

## 2023-05-18 DIAGNOSIS — I42.8 NON-ISCHEMIC CARDIOMYOPATHY (HCC): ICD-10-CM

## 2023-05-18 RX ORDER — POTASSIUM CHLORIDE 20 MEQ/1
TABLET, EXTENDED RELEASE ORAL
Qty: 60 TABLET | Refills: 1 | Status: SHIPPED | OUTPATIENT
Start: 2023-05-18

## 2023-05-18 NOTE — TELEPHONE ENCOUNTER
Amanda Aguillon is requesting a refill on the following medication(s):  Requested Prescriptions     Pending Prescriptions Disp Refills    potassium chloride (KLOR-CON M) 20 MEQ extended release tablet [Pharmacy Med Name: POTASSIUM CL ER 20 MEQ TABLET] 60 tablet 1     Sig: take 1 tablet by mouth once daily       Last Visit Date (If Applicable):  9/29/8950    Next Visit Date:    7/12/2023

## 2023-07-13 ENCOUNTER — OFFICE VISIT (OUTPATIENT)
Dept: FAMILY MEDICINE CLINIC | Age: 48
End: 2023-07-13
Payer: COMMERCIAL

## 2023-07-13 VITALS
DIASTOLIC BLOOD PRESSURE: 82 MMHG | OXYGEN SATURATION: 96 % | WEIGHT: 315 LBS | BODY MASS INDEX: 44.1 KG/M2 | HEIGHT: 71 IN | HEART RATE: 75 BPM | SYSTOLIC BLOOD PRESSURE: 132 MMHG

## 2023-07-13 DIAGNOSIS — G57.93 NEUROPATHY INVOLVING BOTH LOWER EXTREMITIES: ICD-10-CM

## 2023-07-13 DIAGNOSIS — N52.9 ERECTILE DYSFUNCTION, UNSPECIFIED ERECTILE DYSFUNCTION TYPE: ICD-10-CM

## 2023-07-13 DIAGNOSIS — E11.69 TYPE 2 DIABETES MELLITUS WITH OTHER SPECIFIED COMPLICATION, WITHOUT LONG-TERM CURRENT USE OF INSULIN (HCC): Primary | ICD-10-CM

## 2023-07-13 LAB — HBA1C MFR BLD: 6.2 %

## 2023-07-13 PROCEDURE — 3044F HG A1C LEVEL LT 7.0%: CPT

## 2023-07-13 PROCEDURE — 3078F DIAST BP <80 MM HG: CPT

## 2023-07-13 PROCEDURE — 3074F SYST BP LT 130 MM HG: CPT

## 2023-07-13 PROCEDURE — 99214 OFFICE O/P EST MOD 30 MIN: CPT

## 2023-07-13 PROCEDURE — 83037 HB GLYCOSYLATED A1C HOME DEV: CPT

## 2023-07-13 RX ORDER — SILDENAFIL CITRATE 20 MG/1
20 TABLET ORAL DAILY PRN
Qty: 30 TABLET | Refills: 0 | Status: SHIPPED | OUTPATIENT
Start: 2023-07-13 | End: 2023-08-12

## 2023-07-13 RX ORDER — GABAPENTIN 100 MG/1
100 CAPSULE ORAL 2 TIMES DAILY
Qty: 60 CAPSULE | Refills: 0 | Status: SHIPPED | OUTPATIENT
Start: 2023-07-13 | End: 2023-08-12

## 2023-07-13 ASSESSMENT — ENCOUNTER SYMPTOMS
RHINORRHEA: 0
NAUSEA: 0
SHORTNESS OF BREATH: 0
EYE ITCHING: 0
CHEST TIGHTNESS: 0
EYE DISCHARGE: 0
SINUS PAIN: 0
BACK PAIN: 0
ABDOMINAL PAIN: 0
COUGH: 0
CONSTIPATION: 0
COLOR CHANGE: 0
DIARRHEA: 0
WHEEZING: 0
SINUS PRESSURE: 0

## 2023-07-13 NOTE — ASSESSMENT & PLAN NOTE
At goal, continue current medications and continue current treatment plan A1C increasing with weight, however, A1C within goal range. We will continue medications at this time.  Diet lower in carbs

## 2023-07-13 NOTE — PROGRESS NOTES
Josh Guy (:  1975) is a 53 y.o. male,Established patient, here for evaluation of the following chief complaint(s):  Diabetes (Pt is here for a 6 month f/u. )         ASSESSMENT/PLAN:  1. Type 2 diabetes mellitus with other specified complication, without long-term current use of insulin ([facility])  Assessment & Plan:   At goal, continue current medications and continue current treatment plan A1C increasing with weight, however, A1C within goal range. We will continue medications at this time. Diet lower in carbs   Orders:  -     POCT Hb A1C (glycosylated hemoglobin)  2. Neuropathy involving both lower extremities  Assessment & Plan:   Uncontrolled, changes made today: start neruotin 100mg twice a day. Orders:  -     gabapentin (NEURONTIN) 100 MG capsule; Take 1 capsule by mouth 2 times daily for 30 days. Intended supply: 30 days, Disp-60 capsule, R-0Normal  3. Erectile dysfunction, unspecified erectile dysfunction type  -     sildenafil (REVATIO) 20 MG tablet; Take 1 tablet by mouth daily as needed (prior to sexual intercourse), Disp-30 tablet, R-0Normal      Return in about 6 months (around 2024), or if symptoms worsen or fail to improve. Subjective   SUBJECTIVE/OBJECTIVE:  Diabetes  He presents for his follow-up diabetic visit. He has type 2 diabetes mellitus. His disease course has been worsening. Pertinent negatives for hypoglycemia include no confusion, dizziness, headaches or nervousness/anxiousness. Associated symptoms include foot paresthesias. Pertinent negatives for diabetes include no chest pain, no fatigue, no polydipsia, no polyphagia and no weakness. Symptoms are worsening. Diabetic complications include peripheral neuropathy. Current diabetic treatment includes diet and oral agent (monotherapy). Neuropathy    The onset of symptoms is gradual. It is located in the LLE and RLE region. The distribution is worse on left. The patient experiences pain all day.  Severity of pain:

## 2023-07-24 RX ORDER — BUMETANIDE 2 MG/1
TABLET ORAL
Qty: 60 TABLET | Refills: 1 | Status: SHIPPED | OUTPATIENT
Start: 2023-07-24

## 2023-07-24 NOTE — TELEPHONE ENCOUNTER
Halley Sofia is requesting a refill on the following medication(s):  Requested Prescriptions     Pending Prescriptions Disp Refills    bumetanide (BUMEX) 2 MG tablet [Pharmacy Med Name: BUMETANIDE 2 MG TABLET] 60 tablet 1     Sig: take 1 tablet by mouth twice a day before meals       Last Visit Date (If Applicable):  7/12/4452    Next Visit Date:    1/15/2024

## 2023-08-08 DIAGNOSIS — N52.9 ERECTILE DYSFUNCTION, UNSPECIFIED ERECTILE DYSFUNCTION TYPE: ICD-10-CM

## 2023-08-08 RX ORDER — SILDENAFIL CITRATE 20 MG/1
TABLET ORAL
Qty: 30 TABLET | Refills: 0 | Status: SHIPPED | OUTPATIENT
Start: 2023-08-08

## 2023-08-08 NOTE — TELEPHONE ENCOUNTER
Samantha Boss is requesting a refill on the following medication(s):  Requested Prescriptions     Pending Prescriptions Disp Refills    sildenafil (REVATIO) 20 MG tablet [Pharmacy Med Name: SILDENAFIL 20 MG TABLET] 30 tablet 0     Sig: take 1 tablet by mouth once daily if needed prior to intercourse       Last Visit Date (If Applicable):  7/41/6660    Next Visit Date:    1/15/2024

## 2023-09-05 RX ORDER — BUMETANIDE 2 MG/1
TABLET ORAL
Qty: 60 TABLET | Refills: 1 | Status: SHIPPED | OUTPATIENT
Start: 2023-09-05

## 2023-09-05 NOTE — TELEPHONE ENCOUNTER
Josh Guy is requesting a refill on the following medication(s):  Requested Prescriptions     Pending Prescriptions Disp Refills    bumetanide (BUMEX) 2 MG tablet [Pharmacy Med Name: BUMETANIDE 2 MG TABLET] 60 tablet 1     Sig: take 1 tablet by mouth twice a day before meals       Last Visit Date (If Applicable):  1/38/9882    Next Visit Date:    1/15/2024

## 2023-09-11 DIAGNOSIS — N52.9 ERECTILE DYSFUNCTION, UNSPECIFIED ERECTILE DYSFUNCTION TYPE: ICD-10-CM

## 2023-09-11 RX ORDER — SILDENAFIL CITRATE 20 MG/1
TABLET ORAL
Qty: 30 TABLET | Refills: 0 | Status: SHIPPED | OUTPATIENT
Start: 2023-09-11

## 2023-09-11 NOTE — TELEPHONE ENCOUNTER
Ariela Noriega is requesting a refill on the following medication(s):  Requested Prescriptions     Pending Prescriptions Disp Refills    sildenafil (REVATIO) 20 MG tablet [Pharmacy Med Name: SILDENAFIL 20 MG TABLET] 30 tablet 0     Sig: take 1 tablet by mouth once daily if needed prior to intercourse       Last Visit Date (If Applicable):  1/29/3351    Next Visit Date:    1/15/2024

## 2023-09-14 DIAGNOSIS — E11.9 TYPE 2 DIABETES MELLITUS WITHOUT COMPLICATION, WITHOUT LONG-TERM CURRENT USE OF INSULIN (HCC): ICD-10-CM

## 2023-09-14 RX ORDER — METFORMIN HYDROCHLORIDE 500 MG/1
TABLET, EXTENDED RELEASE ORAL
Qty: 120 TABLET | Refills: 2 | Status: SHIPPED | OUTPATIENT
Start: 2023-09-14

## 2023-09-14 NOTE — TELEPHONE ENCOUNTER
Josh Guy is requesting a refill on the following medication(s):  Requested Prescriptions     Pending Prescriptions Disp Refills    metFORMIN (GLUCOPHAGE-XR) 500 MG extended release tablet [Pharmacy Med Name: METFORMIN HCL  MG TABLET] 120 tablet 2     Sig: take 2 tablets by mouth twice a day       Last Visit Date (If Applicable):  8/95/4456    Next Visit Date:    1/15/2024

## 2023-10-11 DIAGNOSIS — N52.9 ERECTILE DYSFUNCTION, UNSPECIFIED ERECTILE DYSFUNCTION TYPE: ICD-10-CM

## 2023-10-11 RX ORDER — SILDENAFIL CITRATE 20 MG/1
TABLET ORAL
Qty: 30 TABLET | Refills: 0 | Status: SHIPPED | OUTPATIENT
Start: 2023-10-11

## 2023-10-11 NOTE — TELEPHONE ENCOUNTER
Layla Duran is requesting a refill on the following medication(s):  Requested Prescriptions     Pending Prescriptions Disp Refills    sildenafil (REVATIO) 20 MG tablet [Pharmacy Med Name: SILDENAFIL 20 MG TABLET] 30 tablet 0     Sig: take 1 tablet by mouth once daily if needed prior to intercourse       Last Visit Date (If Applicable):  7/75/4532    Next Visit Date:    1/15/2024

## 2023-11-02 RX ORDER — BUMETANIDE 2 MG/1
TABLET ORAL
Qty: 60 TABLET | Refills: 1 | Status: SHIPPED | OUTPATIENT
Start: 2023-11-02

## 2023-11-02 NOTE — TELEPHONE ENCOUNTER
Rebecca Alexander is requesting a refill on the following medication(s):  Requested Prescriptions     Pending Prescriptions Disp Refills    bumetanide (BUMEX) 2 MG tablet [Pharmacy Med Name: BUMETANIDE 2 MG TABLET] 60 tablet 1     Sig: take 1 tablet by mouth twice a day before meals       Last Visit Date (If Applicable):  9/38/7747    Next Visit Date:    1/15/2024

## 2023-12-08 DIAGNOSIS — E11.9 TYPE 2 DIABETES MELLITUS WITHOUT COMPLICATION, WITHOUT LONG-TERM CURRENT USE OF INSULIN (HCC): ICD-10-CM

## 2023-12-08 NOTE — TELEPHONE ENCOUNTER
David Santana is requesting a refill on the following medication(s):  Requested Prescriptions     Pending Prescriptions Disp Refills    metFORMIN (GLUCOPHAGE-XR) 500 MG extended release tablet [Pharmacy Med Name: METFORMIN HCL  MG TABLET] 120 tablet 2     Sig: take 2 tablets by mouth twice a day       Last Visit Date (If Applicable):  9/10/3551    Next Visit Date:    1/15/2024

## 2023-12-11 RX ORDER — METFORMIN HYDROCHLORIDE 500 MG/1
TABLET, EXTENDED RELEASE ORAL
Qty: 120 TABLET | Refills: 2 | Status: SHIPPED | OUTPATIENT
Start: 2023-12-11

## 2023-12-27 RX ORDER — BUMETANIDE 2 MG/1
TABLET ORAL
Qty: 60 TABLET | Refills: 1 | Status: SHIPPED | OUTPATIENT
Start: 2023-12-27

## 2023-12-27 NOTE — TELEPHONE ENCOUNTER
Cj [place] is requesting a refill on the following medication(s):  Requested Prescriptions     Pending Prescriptions Disp Refills    bumetanide (BUMEX) 2 MG tablet [Pharmacy Med Name: BUMETANIDE 2 MG TABLET] 60 tablet 1     Sig: take 1 tablet by mouth twice a day before meals       Last Visit Date (If Applicable):  8/81/5019    Next Visit Date:    1/15/2024

## 2024-01-15 ENCOUNTER — OFFICE VISIT (OUTPATIENT)
Dept: FAMILY MEDICINE CLINIC | Age: 49
End: 2024-01-15
Payer: COMMERCIAL

## 2024-01-15 VITALS
HEART RATE: 75 BPM | WEIGHT: 315 LBS | OXYGEN SATURATION: 94 % | DIASTOLIC BLOOD PRESSURE: 62 MMHG | SYSTOLIC BLOOD PRESSURE: 112 MMHG | BODY MASS INDEX: 48.54 KG/M2

## 2024-01-15 DIAGNOSIS — I10 ESSENTIAL (PRIMARY) HYPERTENSION: ICD-10-CM

## 2024-01-15 DIAGNOSIS — I50.23 ACUTE ON CHRONIC SYSTOLIC CONGESTIVE HEART FAILURE (HCC): ICD-10-CM

## 2024-01-15 DIAGNOSIS — I50.9 HEART FAILURE, UNSPECIFIED HF CHRONICITY, UNSPECIFIED HEART FAILURE TYPE (HCC): ICD-10-CM

## 2024-01-15 DIAGNOSIS — E11.69 TYPE 2 DIABETES MELLITUS WITH OTHER SPECIFIED COMPLICATION, WITHOUT LONG-TERM CURRENT USE OF INSULIN (HCC): ICD-10-CM

## 2024-01-15 DIAGNOSIS — Z13.220 LIPID SCREENING: Primary | ICD-10-CM

## 2024-01-15 PROBLEM — E11.622 DIABETIC ULCER OF LOWER EXTREMITY (HCC): Status: ACTIVE | Noted: 2024-01-15

## 2024-01-15 PROBLEM — I47.29 NSVT (NONSUSTAINED VENTRICULAR TACHYCARDIA) (HCC): Status: ACTIVE | Noted: 2024-01-15

## 2024-01-15 PROBLEM — I47.29 NSVT (NONSUSTAINED VENTRICULAR TACHYCARDIA) (HCC): Status: RESOLVED | Noted: 2024-01-15 | Resolved: 2024-01-15

## 2024-01-15 PROBLEM — L97.909 DIABETIC ULCER OF LOWER EXTREMITY (HCC): Status: ACTIVE | Noted: 2024-01-15

## 2024-01-15 LAB — HBA1C MFR BLD: 6.7 %

## 2024-01-15 PROCEDURE — 83036 HEMOGLOBIN GLYCOSYLATED A1C: CPT

## 2024-01-15 PROCEDURE — 3074F SYST BP LT 130 MM HG: CPT

## 2024-01-15 PROCEDURE — 3044F HG A1C LEVEL LT 7.0%: CPT

## 2024-01-15 PROCEDURE — 99214 OFFICE O/P EST MOD 30 MIN: CPT

## 2024-01-15 PROCEDURE — 3078F DIAST BP <80 MM HG: CPT

## 2024-01-15 ASSESSMENT — ENCOUNTER SYMPTOMS
COLOR CHANGE: 0
BACK PAIN: 0
DIARRHEA: 0
ABDOMINAL PAIN: 0
WHEEZING: 0
COUGH: 0
EYE DISCHARGE: 0
RHINORRHEA: 0
SINUS PRESSURE: 0
CHEST TIGHTNESS: 0
CONSTIPATION: 0
NAUSEA: 0
EYE ITCHING: 0
SINUS PAIN: 0
SHORTNESS OF BREATH: 0

## 2024-01-15 NOTE — ASSESSMENT & PLAN NOTE
At goal, continue current medications, continue current treatment plan, and lifestyle modifications recommended- continue entreto 49-51mg one per day, pacerone 400mg twice a day. Bumex 2mg twice a day before meals. Will check kidney function

## 2024-01-15 NOTE — PROGRESS NOTES
has type 2 diabetes mellitus. His disease course has been stable. Pertinent negatives for hypoglycemia include no confusion, dizziness, headaches or nervousness/anxiousness. Pertinent negatives for diabetes include no chest pain, no fatigue, no polydipsia, no polyphagia and no weakness. There are no hypoglycemic complications.       Review of Systems   Constitutional:  Negative for chills, fatigue, fever and unexpected weight change.   HENT:  Negative for congestion, ear pain, rhinorrhea, sinus pressure and sinus pain.    Eyes:  Negative for discharge, itching and visual disturbance.   Respiratory:  Negative for cough, chest tightness, shortness of breath and wheezing.    Cardiovascular:  Negative for chest pain, palpitations and leg swelling.   Gastrointestinal:  Negative for abdominal pain, constipation, diarrhea and nausea.   Endocrine: Negative for cold intolerance, heat intolerance, polydipsia and polyphagia.   Genitourinary:  Negative for dysuria, flank pain and frequency.   Musculoskeletal:  Positive for arthralgias. Negative for back pain and myalgias.   Skin:  Negative for color change.   Allergic/Immunologic: Negative for environmental allergies and food allergies.   Neurological:  Negative for dizziness, weakness, light-headedness, numbness and headaches.   Psychiatric/Behavioral:  Negative for agitation, behavioral problems and confusion. The patient is not nervous/anxious.           Objective   Physical Exam  Vitals and nursing note reviewed.   Constitutional:       General: He is not in acute distress.     Appearance: Normal appearance. He is not ill-appearing.   HENT:      Head: Normocephalic and atraumatic.      Right Ear: Tympanic membrane and external ear normal.      Left Ear: Tympanic membrane and external ear normal.      Nose: Nose normal. No congestion or rhinorrhea.      Mouth/Throat:      Mouth: Mucous membranes are moist.      Pharynx: Oropharynx is clear. No posterior oropharyngeal

## 2024-01-17 PROBLEM — L97.909 DIABETIC ULCER OF LOWER EXTREMITY (HCC): Status: RESOLVED | Noted: 2024-01-15 | Resolved: 2024-01-17

## 2024-01-17 PROBLEM — E11.622 DIABETIC ULCER OF LOWER EXTREMITY (HCC): Status: RESOLVED | Noted: 2024-01-15 | Resolved: 2024-01-17

## 2024-01-17 NOTE — ASSESSMENT & PLAN NOTE
At goal, continue current medications and continue current treatment plan A1c is slightly increased compared to prior still within goal range.  Will continue on current dosages of medications metformin  mg 2 tablets by mouth twice daily.  Will check kidney function and fasting sugar.

## 2024-02-12 DIAGNOSIS — I10 ESSENTIAL (PRIMARY) HYPERTENSION: ICD-10-CM

## 2024-02-12 DIAGNOSIS — Z13.220 LIPID SCREENING: ICD-10-CM

## 2024-02-12 DIAGNOSIS — I50.9 HEART FAILURE, UNSPECIFIED HF CHRONICITY, UNSPECIFIED HEART FAILURE TYPE (HCC): ICD-10-CM

## 2024-02-12 DIAGNOSIS — E78.2 MIXED HYPERLIPIDEMIA: Primary | ICD-10-CM

## 2024-02-12 DIAGNOSIS — E11.69 TYPE 2 DIABETES MELLITUS WITH OTHER SPECIFIED COMPLICATION, WITHOUT LONG-TERM CURRENT USE OF INSULIN (HCC): ICD-10-CM

## 2024-02-12 RX ORDER — ROSUVASTATIN CALCIUM 10 MG/1
10 TABLET, COATED ORAL NIGHTLY
Qty: 30 TABLET | Refills: 3 | Status: SHIPPED | OUTPATIENT
Start: 2024-02-12

## 2024-02-26 RX ORDER — BUMETANIDE 2 MG/1
TABLET ORAL
Qty: 60 TABLET | Refills: 1 | Status: SHIPPED | OUTPATIENT
Start: 2024-02-26

## 2024-02-26 NOTE — TELEPHONE ENCOUNTER
Francis Esparza is requesting a refill on the following medication(s):  Requested Prescriptions     Pending Prescriptions Disp Refills    bumetanide (BUMEX) 2 MG tablet [Pharmacy Med Name: BUMETANIDE 2 MG TABLET] 60 tablet 1     Sig: take 1 tablet by mouth twice a day before meals       Last Visit Date (If Applicable):  1/15/2024    Next Visit Date:    7/15/2024

## 2024-03-03 DIAGNOSIS — E11.9 TYPE 2 DIABETES MELLITUS WITHOUT COMPLICATION, WITHOUT LONG-TERM CURRENT USE OF INSULIN (HCC): ICD-10-CM

## 2024-03-04 RX ORDER — METFORMIN HYDROCHLORIDE 500 MG/1
TABLET, EXTENDED RELEASE ORAL
Qty: 120 TABLET | Refills: 2 | Status: SHIPPED | OUTPATIENT
Start: 2024-03-04

## 2024-03-04 NOTE — TELEPHONE ENCOUNTER
Francis Esparza is requesting a refill on the following medication(s):  Requested Prescriptions     Pending Prescriptions Disp Refills    metFORMIN (GLUCOPHAGE-XR) 500 MG extended release tablet [Pharmacy Med Name: METFORMIN HCL  MG TABLET] 120 tablet 2     Sig: take 2 tablets by mouth twice a day       Last Visit Date (If Applicable):  1/15/2024    Next Visit Date:    7/15/2024

## 2024-04-11 ENCOUNTER — COMMUNITY OUTREACH (OUTPATIENT)
Dept: FAMILY MEDICINE CLINIC | Age: 49
End: 2024-04-11

## 2024-04-18 ENCOUNTER — TELEPHONE (OUTPATIENT)
Dept: FAMILY MEDICINE CLINIC | Age: 49
End: 2024-04-18

## 2024-04-18 DIAGNOSIS — I50.20 SYSTOLIC CHF WITH REDUCED LEFT VENTRICULAR FUNCTION, NYHA CLASS 3 (HCC): Primary | ICD-10-CM

## 2024-04-18 DIAGNOSIS — I10 ESSENTIAL (PRIMARY) HYPERTENSION: ICD-10-CM

## 2024-04-18 NOTE — TELEPHONE ENCOUNTER
[facility] called.  Patient did not feel well on the way to work but \"went anyway\" according to girlfriend.  Did not feel well during the day at work with chest heaviness and SOB.  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  Started not feeling well on the way home so pulled over and called for help.  EMS responded but when they arrived no cardiac activity present.  He was pronounced  at the scene.  TOD [id number] on 2024. Death certificate completed.